# Patient Record
Sex: FEMALE | Race: WHITE | Employment: OTHER | URBAN - METROPOLITAN AREA
[De-identification: names, ages, dates, MRNs, and addresses within clinical notes are randomized per-mention and may not be internally consistent; named-entity substitution may affect disease eponyms.]

---

## 2017-04-27 ENCOUNTER — APPOINTMENT (OUTPATIENT)
Dept: GENERAL RADIOLOGY | Age: 37
DRG: 948 | End: 2017-04-27
Attending: EMERGENCY MEDICINE
Payer: MEDICARE

## 2017-04-27 ENCOUNTER — APPOINTMENT (OUTPATIENT)
Dept: CT IMAGING | Age: 37
DRG: 948 | End: 2017-04-27
Attending: EMERGENCY MEDICINE
Payer: MEDICARE

## 2017-04-27 ENCOUNTER — HOSPITAL ENCOUNTER (INPATIENT)
Age: 37
LOS: 2 days | Discharge: PSYCHIATRIC OR SUBSTANCE USE FACILITY | DRG: 948 | End: 2017-04-29
Attending: EMERGENCY MEDICINE | Admitting: INTERNAL MEDICINE
Payer: MEDICARE

## 2017-04-27 DIAGNOSIS — G25.79 SEROTONIN SYNDROME: Primary | ICD-10-CM

## 2017-04-27 DIAGNOSIS — G93.40 ACUTE ENCEPHALOPATHY: ICD-10-CM

## 2017-04-27 DIAGNOSIS — R74.01 TRANSAMINITIS: ICD-10-CM

## 2017-04-27 PROBLEM — F19.10 POLYSUBSTANCE ABUSE (HCC): Status: ACTIVE | Noted: 2017-04-27

## 2017-04-27 PROBLEM — R41.82 ALTERED MENTAL STATUS: Status: ACTIVE | Noted: 2017-04-27

## 2017-04-27 PROBLEM — R79.89 ELEVATED LFTS: Status: ACTIVE | Noted: 2017-04-27

## 2017-04-27 LAB
ALBUMIN SERPL BCP-MCNC: 4.4 G/DL (ref 3.5–5)
ALBUMIN/GLOB SERPL: 1.3 {RATIO} (ref 1.2–3.5)
ALP SERPL-CCNC: 243 U/L (ref 50–136)
ALT SERPL-CCNC: 334 U/L (ref 12–65)
AMMONIA PLAS-SCNC: 17 UMOL/L (ref 11–32)
AMPHET UR QL SCN: NEGATIVE
ANION GAP BLD CALC-SCNC: 10 MMOL/L (ref 7–16)
APAP SERPL-MCNC: 0 UG/ML (ref 10–30)
AST SERPL W P-5'-P-CCNC: 238 U/L (ref 15–37)
BACTERIA URNS QL MICRO: 0 /HPF
BARBITURATES UR QL SCN: NEGATIVE
BASOPHILS # BLD AUTO: 0 K/UL (ref 0–0.2)
BASOPHILS # BLD: 0 % (ref 0–2)
BENZODIAZ UR QL: NEGATIVE
BILIRUB SERPL-MCNC: 0.8 MG/DL (ref 0.2–1.1)
BUN SERPL-MCNC: 13 MG/DL (ref 6–23)
CALCIUM SERPL-MCNC: 8.7 MG/DL (ref 8.3–10.4)
CANNABINOIDS UR QL SCN: NEGATIVE
CASTS URNS QL MICRO: 0 /LPF
CHLORIDE SERPL-SCNC: 100 MMOL/L (ref 98–107)
CK SERPL-CCNC: 160 U/L (ref 21–215)
CO2 SERPL-SCNC: 27 MMOL/L (ref 21–32)
COCAINE UR QL SCN: NEGATIVE
CREAT SERPL-MCNC: 1.04 MG/DL (ref 0.6–1)
DIFFERENTIAL METHOD BLD: ABNORMAL
EOSINOPHIL # BLD: 0.2 K/UL (ref 0–0.8)
EOSINOPHIL NFR BLD: 2 % (ref 0.5–7.8)
EPI CELLS #/AREA URNS HPF: NORMAL /HPF
ERYTHROCYTE [DISTWIDTH] IN BLOOD BY AUTOMATED COUNT: 12.4 % (ref 11.9–14.6)
ETHANOL SERPL-MCNC: <3 MG/DL
GLOBULIN SER CALC-MCNC: 3.5 G/DL (ref 2.3–3.5)
GLUCOSE SERPL-MCNC: 103 MG/DL (ref 65–100)
HCG UR QL: NEGATIVE
HCT VFR BLD AUTO: 39.9 % (ref 35.8–46.3)
HGB BLD-MCNC: 13.5 G/DL (ref 11.7–15.4)
IMM GRANULOCYTES # BLD: 0 K/UL (ref 0–0.5)
IMM GRANULOCYTES NFR BLD AUTO: 0.2 % (ref 0–5)
LACTATE BLD-SCNC: 0.9 MMOL/L (ref 0.5–1.9)
LYMPHOCYTES # BLD AUTO: 39 % (ref 13–44)
LYMPHOCYTES # BLD: 3.2 K/UL (ref 0.5–4.6)
MAGNESIUM SERPL-MCNC: 1.9 MG/DL (ref 1.8–2.4)
MCH RBC QN AUTO: 29.7 PG (ref 26.1–32.9)
MCHC RBC AUTO-ENTMCNC: 33.8 G/DL (ref 31.4–35)
MCV RBC AUTO: 87.9 FL (ref 79.6–97.8)
METHADONE UR QL: NEGATIVE
MONOCYTES # BLD: 1.3 K/UL (ref 0.1–1.3)
MONOCYTES NFR BLD AUTO: 16 % (ref 4–12)
NEUTS SEG # BLD: 3.7 K/UL (ref 1.7–8.2)
NEUTS SEG NFR BLD AUTO: 43 % (ref 43–78)
OPIATES UR QL: NEGATIVE
PCP UR QL: NEGATIVE
PHOSPHATE SERPL-MCNC: 4.7 MG/DL (ref 2.5–4.5)
PLATELET # BLD AUTO: 232 K/UL (ref 150–450)
PMV BLD AUTO: 10.3 FL (ref 10.8–14.1)
POTASSIUM SERPL-SCNC: 3.9 MMOL/L (ref 3.5–5.1)
PROT SERPL-MCNC: 7.9 G/DL (ref 6.3–8.2)
RBC # BLD AUTO: 4.54 M/UL (ref 4.05–5.25)
RBC #/AREA URNS HPF: 0 /HPF
SALICYLATES SERPL-MCNC: 1.6 MG/DL (ref 10–20)
SODIUM SERPL-SCNC: 137 MMOL/L (ref 136–145)
TSH SERPL DL<=0.005 MIU/L-ACNC: 6.79 UIU/ML (ref 0.36–3.74)
WBC # BLD AUTO: 8.4 K/UL (ref 4.3–11.1)
WBC URNS QL MICRO: NORMAL /HPF

## 2017-04-27 PROCEDURE — 65270000029 HC RM PRIVATE

## 2017-04-27 PROCEDURE — 80307 DRUG TEST PRSMV CHEM ANLYZR: CPT | Performed by: EMERGENCY MEDICINE

## 2017-04-27 PROCEDURE — 70450 CT HEAD/BRAIN W/O DYE: CPT

## 2017-04-27 PROCEDURE — 82550 ASSAY OF CK (CPK): CPT | Performed by: EMERGENCY MEDICINE

## 2017-04-27 PROCEDURE — 74022 RADEX COMPL AQT ABD SERIES: CPT

## 2017-04-27 PROCEDURE — 74011250636 HC RX REV CODE- 250/636: Performed by: EMERGENCY MEDICINE

## 2017-04-27 PROCEDURE — 83605 ASSAY OF LACTIC ACID: CPT

## 2017-04-27 PROCEDURE — 83735 ASSAY OF MAGNESIUM: CPT | Performed by: EMERGENCY MEDICINE

## 2017-04-27 PROCEDURE — 99285 EMERGENCY DEPT VISIT HI MDM: CPT

## 2017-04-27 PROCEDURE — 84439 ASSAY OF FREE THYROXINE: CPT | Performed by: EMERGENCY MEDICINE

## 2017-04-27 PROCEDURE — 82140 ASSAY OF AMMONIA: CPT | Performed by: EMERGENCY MEDICINE

## 2017-04-27 PROCEDURE — 84100 ASSAY OF PHOSPHORUS: CPT | Performed by: EMERGENCY MEDICINE

## 2017-04-27 PROCEDURE — 96374 THER/PROPH/DIAG INJ IV PUSH: CPT

## 2017-04-27 PROCEDURE — 85025 COMPLETE CBC W/AUTO DIFF WBC: CPT | Performed by: EMERGENCY MEDICINE

## 2017-04-27 PROCEDURE — 93005 ELECTROCARDIOGRAM TRACING: CPT | Performed by: EMERGENCY MEDICINE

## 2017-04-27 PROCEDURE — 81025 URINE PREGNANCY TEST: CPT

## 2017-04-27 PROCEDURE — 81015 MICROSCOPIC EXAM OF URINE: CPT | Performed by: EMERGENCY MEDICINE

## 2017-04-27 PROCEDURE — 84443 ASSAY THYROID STIM HORMONE: CPT | Performed by: EMERGENCY MEDICINE

## 2017-04-27 PROCEDURE — 81003 URINALYSIS AUTO W/O SCOPE: CPT

## 2017-04-27 PROCEDURE — 96375 TX/PRO/DX INJ NEW DRUG ADDON: CPT

## 2017-04-27 PROCEDURE — 96376 TX/PRO/DX INJ SAME DRUG ADON: CPT

## 2017-04-27 PROCEDURE — 80053 COMPREHEN METABOLIC PANEL: CPT | Performed by: EMERGENCY MEDICINE

## 2017-04-27 PROCEDURE — 74011250636 HC RX REV CODE- 250/636: Performed by: INTERNAL MEDICINE

## 2017-04-27 RX ORDER — LEVETIRACETAM 500 MG/1
500 TABLET ORAL 2 TIMES DAILY
Status: DISCONTINUED | OUTPATIENT
Start: 2017-04-27 | End: 2017-04-29 | Stop reason: HOSPADM

## 2017-04-27 RX ORDER — MIRTAZAPINE 15 MG/1
15 TABLET, FILM COATED ORAL
COMMUNITY

## 2017-04-27 RX ORDER — SODIUM CHLORIDE 9 MG/ML
125 INJECTION, SOLUTION INTRAVENOUS CONTINUOUS
Status: DISCONTINUED | OUTPATIENT
Start: 2017-04-27 | End: 2017-04-29 | Stop reason: HOSPADM

## 2017-04-27 RX ORDER — SODIUM CHLORIDE 0.9 % (FLUSH) 0.9 %
5-10 SYRINGE (ML) INJECTION AS NEEDED
Status: DISCONTINUED | OUTPATIENT
Start: 2017-04-27 | End: 2017-04-29 | Stop reason: HOSPADM

## 2017-04-27 RX ORDER — DIAZEPAM 10 MG/2ML
5 INJECTION INTRAMUSCULAR
Status: COMPLETED | OUTPATIENT
Start: 2017-04-27 | End: 2017-04-27

## 2017-04-27 RX ORDER — SODIUM CHLORIDE 0.9 % (FLUSH) 0.9 %
5-10 SYRINGE (ML) INJECTION EVERY 8 HOURS
Status: DISCONTINUED | OUTPATIENT
Start: 2017-04-27 | End: 2017-04-28 | Stop reason: SDUPTHER

## 2017-04-27 RX ORDER — LORAZEPAM 2 MG/ML
2 INJECTION INTRAMUSCULAR
Status: COMPLETED | OUTPATIENT
Start: 2017-04-27 | End: 2017-04-27

## 2017-04-27 RX ORDER — SODIUM CHLORIDE 0.9 % (FLUSH) 0.9 %
5-10 SYRINGE (ML) INJECTION AS NEEDED
Status: DISCONTINUED | OUTPATIENT
Start: 2017-04-27 | End: 2017-04-28 | Stop reason: SDUPTHER

## 2017-04-27 RX ORDER — LEVETIRACETAM 500 MG/1
500 TABLET ORAL 2 TIMES DAILY
COMMUNITY

## 2017-04-27 RX ORDER — AMOXICILLIN 250 MG
2 CAPSULE ORAL DAILY
Status: DISCONTINUED | OUTPATIENT
Start: 2017-04-28 | End: 2017-04-29 | Stop reason: HOSPADM

## 2017-04-27 RX ORDER — DIAZEPAM 10 MG/2ML
5 INJECTION INTRAMUSCULAR
Status: DISCONTINUED | OUTPATIENT
Start: 2017-04-27 | End: 2017-04-28

## 2017-04-27 RX ORDER — ACETAMINOPHEN 325 MG/1
650 TABLET ORAL
Status: DISCONTINUED | OUTPATIENT
Start: 2017-04-27 | End: 2017-04-27

## 2017-04-27 RX ORDER — LORAZEPAM 2 MG/ML
INJECTION INTRAMUSCULAR
Status: ACTIVE
Start: 2017-04-27 | End: 2017-04-28

## 2017-04-27 RX ORDER — LORAZEPAM 2 MG/ML
1 INJECTION INTRAMUSCULAR
Status: COMPLETED | OUTPATIENT
Start: 2017-04-27 | End: 2017-04-27

## 2017-04-27 RX ORDER — TRAZODONE HYDROCHLORIDE 50 MG/1
50 TABLET ORAL
COMMUNITY

## 2017-04-27 RX ORDER — SENNOSIDES 8.6 MG/1
1 TABLET ORAL 2 TIMES DAILY
COMMUNITY

## 2017-04-27 RX ORDER — LORAZEPAM 2 MG/ML
2 INJECTION INTRAMUSCULAR
Status: DISCONTINUED | OUTPATIENT
Start: 2017-04-27 | End: 2017-04-29 | Stop reason: HOSPADM

## 2017-04-27 RX ORDER — MAGNESIUM CITRATE
296 SOLUTION, ORAL ORAL
COMMUNITY

## 2017-04-27 RX ORDER — ONDANSETRON HYDROCHLORIDE 8 MG/1
8 TABLET, FILM COATED ORAL
COMMUNITY
End: 2022-03-15 | Stop reason: ALTCHOICE

## 2017-04-27 RX ORDER — LORAZEPAM 1 MG/1
1 TABLET ORAL 3 TIMES DAILY
COMMUNITY
End: 2017-04-29

## 2017-04-27 RX ORDER — OLANZAPINE 10 MG/1
10 TABLET ORAL
COMMUNITY
End: 2017-04-29

## 2017-04-27 RX ORDER — SODIUM CHLORIDE 0.9 % (FLUSH) 0.9 %
5-10 SYRINGE (ML) INJECTION EVERY 8 HOURS
Status: DISCONTINUED | OUTPATIENT
Start: 2017-04-27 | End: 2017-04-29 | Stop reason: HOSPADM

## 2017-04-27 RX ORDER — ENOXAPARIN SODIUM 100 MG/ML
40 INJECTION SUBCUTANEOUS EVERY 24 HOURS
Status: DISCONTINUED | OUTPATIENT
Start: 2017-04-27 | End: 2017-04-29 | Stop reason: HOSPADM

## 2017-04-27 RX ORDER — PROMETHAZINE HYDROCHLORIDE 25 MG/1
25 TABLET ORAL
Status: DISCONTINUED | OUTPATIENT
Start: 2017-04-27 | End: 2017-04-29 | Stop reason: HOSPADM

## 2017-04-27 RX ORDER — LACTULOSE 10 G/15ML
10 SOLUTION ORAL; RECTAL 3 TIMES DAILY
COMMUNITY
End: 2017-04-29

## 2017-04-27 RX ORDER — HALOPERIDOL 10 MG/1
5 TABLET ORAL 3 TIMES DAILY
COMMUNITY
End: 2017-04-29

## 2017-04-27 RX ORDER — LORAZEPAM 2 MG/ML
2 INJECTION INTRAMUSCULAR
Status: DISCONTINUED | OUTPATIENT
Start: 2017-04-27 | End: 2017-04-27

## 2017-04-27 RX ADMIN — SODIUM CHLORIDE 125 ML/HR: 900 INJECTION, SOLUTION INTRAVENOUS at 23:07

## 2017-04-27 RX ADMIN — LORAZEPAM 1 MG: 2 INJECTION INTRAMUSCULAR; INTRAVENOUS at 18:40

## 2017-04-27 RX ADMIN — DIAZEPAM 5 MG: 5 INJECTION, SOLUTION INTRAMUSCULAR; INTRAVENOUS at 23:05

## 2017-04-27 RX ADMIN — LORAZEPAM 1 MG: 2 INJECTION, SOLUTION INTRAMUSCULAR; INTRAVENOUS at 17:45

## 2017-04-27 RX ADMIN — LORAZEPAM 2 MG: 2 INJECTION INTRAMUSCULAR; INTRAVENOUS at 23:34

## 2017-04-27 RX ADMIN — ENOXAPARIN SODIUM 40 MG: 40 INJECTION SUBCUTANEOUS at 23:06

## 2017-04-27 RX ADMIN — DIAZEPAM 5 MG: 5 INJECTION, SOLUTION INTRAMUSCULAR; INTRAVENOUS at 18:56

## 2017-04-27 RX ADMIN — LORAZEPAM 2 MG: 2 INJECTION INTRAMUSCULAR; INTRAVENOUS at 18:46

## 2017-04-27 RX ADMIN — Medication 10 ML: at 23:05

## 2017-04-27 NOTE — ED PROVIDER NOTES
HPI Comments: 65-year-old female presents with caregiver from [facility] for altered mental status. She has a history of alcohol, heroin, and methamphetamine abuse. She has been at [facility] since April 3. She has been doing well on detoxification program until 3 days ago. She has had increased agitation, uncontrollable shaking, and confusion. Unable to get any further history from the patient. Caregiver states she has not been eating or drinking well. According to the records, she was treated with ciprofloxacin April 14 through the 17th for a urinary tract infection. She is on buprenorphine, Haldol, Keppra, Ativan, Zyprexa, venlafaxine amongst multiple other medications. She was also treated with Bactrim and Flagyl. Caregiver states she has not slept for 2 days. Patient is a 30 y.o. female presenting with altered mental status. The history is provided by the EMS personnel and a caregiver. The history is limited by the condition of the patient. Altered mental status           Past Medical History:   Diagnosis Date    Anxiety and depression     anxiety, depression    Back pain     problems with L4-5 vertebra    Common bile duct dilatation 6/12/2013    Depression     Gall bladder disease 6/12/2013    Psychiatric disorder        Past Surgical History:   Procedure Laterality Date    HX CHOLECYSTECTOMY      HX ORTHOPAEDIC  2007    left foot, tonail excision         Family History:   Problem Relation Age of Onset    Cancer Paternal Grandmother      skin cancer       Social History     Social History    Marital status:      Spouse name: N/A    Number of children: N/A    Years of education: N/A     Occupational History    Not on file.      Social History Main Topics    Smoking status: Never Smoker    Smokeless tobacco: Not on file    Alcohol use No    Drug use: No    Sexual activity: No     Other Topics Concern    Not on file     Social History Narrative         ALLERGIES: Review of patient's allergies indicates no known allergies. Review of Systems   Unable to perform ROS: Mental status change       Vitals:    04/27/17 1706   BP: 98/68   Pulse: 72   Resp: 16   Temp: 98.6 °F (37 °C)   SpO2: 99%   Weight: 59 kg (130 lb)   Height: 5' 7\" (1.702 m)            Physical Exam   Constitutional: She appears well-developed. HENT:   Head: Normocephalic and atraumatic. Right Ear: External ear normal.   Left Ear: External ear normal.   Nose: Nose normal.   Mouth/Throat: Mucous membranes are dry. Eyes: Conjunctivae and EOM are normal. Pupils are equal, round, and reactive to light. Neck: Neck supple. Cardiovascular: Regular rhythm and normal heart sounds. No murmur heard. Pulmonary/Chest: Effort normal and breath sounds normal. No respiratory distress. Abdominal: Soft. There is no tenderness. Musculoskeletal: Normal range of motion. She exhibits no deformity. Neurological: She is alert. She has normal strength. She is disoriented. She displays tremor. No sensory deficit. Skin: Skin is dry. She is not diaphoretic. No erythema. Psychiatric: Her mood appears anxious. Her affect is labile and inappropriate. Her speech is slurred. She is agitated and hyperactive. Cognition and memory are impaired. She expresses inappropriate judgment. She is noncommunicative. She is inattentive. Nursing note and vitals reviewed. MDM  Number of Diagnoses or Management Options  Diagnosis management comments: Parts of this document were created using dragon voice recognition software. The chart has been reviewed but errors may still be present. 8:04 PM  Patient became extremely agitated. She required 4 mg of Ativan and 5 mg of Valium in order to obtain labs and imaging. Suspect serotonin syndrome. LFTs have been gradually increasing with normal ammonia level. Normal lactic acid and CK. No signs of neuroleptic malignant syndrome. Discussed with hospitalist for admission. Amount and/or Complexity of Data Reviewed  Clinical lab tests: ordered and reviewed (Results for orders placed or performed during the hospital encounter of 04/27/17  -AMMONIA       Result                                            Value                         Ref Range                       Ammonia                                           17                            11 - 32 UMOL/L             -CBC WITH AUTOMATED DIFF       Result                                            Value                         Ref Range                       WBC                                               8.4                           4.3 - 11.1 K/uL                 RBC                                               4.54                          4.05 - 5.25 M/uL                HGB                                               13.5                          11.7 - 15.4 g/dL                HCT                                               39.9                          35.8 - 46.3 %                   MCV                                               87.9                          79.6 - 97.8 FL                  MCH                                               29.7                          26.1 - 32.9 PG                  MCHC                                              33.8                          31.4 - 35.0 g/dL                RDW                                               12.4                          11.9 - 14.6 %                   PLATELET                                          232                           150 - 450 K/uL                  MPV                                               10.3 (L)                      10.8 - 14.1 FL                  DF                                                AUTOMATED                                                     NEUTROPHILS                                       43                            43 - 78 %                       LYMPHOCYTES                                       39 13 - 44 %                       MONOCYTES                                         16 (H)                        4.0 - 12.0 %                    EOSINOPHILS                                       2                             0.5 - 7.8 %                     BASOPHILS                                         0                             0.0 - 2.0 %                     IMMATURE GRANULOCYTES                             0.2                           0.0 - 5.0 %                     ABS. NEUTROPHILS                                  3.7                           1.7 - 8.2 K/UL                  ABS. LYMPHOCYTES                                  3.2                           0.5 - 4.6 K/UL                  ABS. MONOCYTES                                    1.3                           0.1 - 1.3 K/UL                  ABS. EOSINOPHILS                                  0.2                           0.0 - 0.8 K/UL                  ABS. BASOPHILS                                    0.0                           0.0 - 0.2 K/UL                  ABS. IMM.  GRANS.                                  0.0                           0.0 - 0.5 K/UL             -METABOLIC PANEL, COMPREHENSIVE       Result                                            Value                         Ref Range                       Sodium                                            137                           136 - 145 mmol/L                Potassium                                         3.9                           3.5 - 5.1 mmol/L                Chloride                                          100                           98 - 107 mmol/L                 CO2                                               27                            21 - 32 mmol/L                  Anion gap                                         10                            7 - 16 mmol/L                   Glucose                                           103 (H) 65 - 100 mg/dL                  BUN                                               13                            6 - 23 MG/DL                    Creatinine                                        1. 04 (H)                      0.6 - 1.0 MG/DL                 GFR est AA                                        >60                           >60 ml/min/1.73m2               GFR est non-AA                                    >60                           >60 ml/min/1.73m2               Calcium                                           8.7                           8.3 - 10.4 MG/DL                Bilirubin, total                                  0.8                           0.2 - 1.1 MG/DL                 ALT (SGPT)                                        334 (H)                       12 - 65 U/L                     AST (SGOT)                                        238 (H)                       15 - 37 U/L                     Alk. phosphatase                                  243 (H)                       50 - 136 U/L                    Protein, total                                    7.9                           6.3 - 8.2 g/dL                  Albumin                                           4.4                           3.5 - 5.0 g/dL                  Globulin                                          3.5                           2.3 - 3.5 g/dL                  A-G Ratio                                         1.3                           1.2 - 3.5                  -CK       Result                                            Value                         Ref Range                       CK                                                160                           21 - 215 U/L               -ETHYL ALCOHOL       Result                                            Value                         Ref Range                       ALCOHOL(ETHYL),SERUM                              <3                            MG/DL -SALICYLATE       Result                                            Value                         Ref Range                       SALICYLATE                                        1.6 (L)                       10 - 20 MG/DL              -MAGNESIUM       Result                                            Value                         Ref Range                       Magnesium                                         1.9                           1.8 - 2.4 mg/dL            -PHOSPHORUS       Result                                            Value                         Ref Range                       Phosphorus                                        4.7 (H)                       2.5 - 4.5 MG/DL            -TSH 3RD GENERATION       Result                                            Value                         Ref Range                       TSH                                               6.786 (H)                     0.358 - 3.740 uIU/mL       -ACETAMINOPHEN       Result                                            Value                         Ref Range                       ACETAMINOPHEN                                     0 (L)                         10.0 - 30.0 ug/mL          -POC LACTIC ACID       Result                                            Value                         Ref Range                       Lactic Acid (POC)                                 0.9                           0.5 - 1.9 mmol/L             )  Tests in the radiology section of CPT®: ordered and reviewed (Xr Abd Acute W 1 V Chest    Result Date: 4/27/2017  Acute abdominal series: 04/27/2017 HISTORY: Mild abdominal pain An acute abdominal series was performed. COMPARISON: None FINDINGS: There is mild distention involving portions of the colon with moderate amount of fecal material present. There are no dilated small bowel loops. There is no free intraperitoneal air. No radiopaque densities resembling calculi are identified.  The patient is status post cholecystectomy. A single view the chest was obtained. No prior studies are available for comparison. The cardiac and mediastinal silhouettes are normal in size and configuration. The lungs and pleural spaces are clear. The pulmonary vasculature is within normal limits. IMPRESSION: Mildly distended colon with moderate amount of fecal material.      Ct Head Wo Cont    Result Date: 4/27/2017  CT head without contrast: 04/27/2017 History: Increased agitation x1 day, history of drug abuse. Technique: 5mm axial images were obtained from the skull base to the vertex without intravenous contrast.  Radiation dose reduction techniques were used for this study:  Our CT scanners use one or all of the following: Automated exposure control, adjustment of the mA and/or kVp according to patient's size, iterative reconstruction. Comparison: None Findings: The ventricles and sulci are normal in size and configuration. There are no extra-axial fluid collections. There is no evidence to suggest an acute major territorial infarct. There is no evidence of acute intracranial hemorrhage or mass effect. The bony calvarium is intact. The visualized mastoid air cells and paranasal sinuses are well pneumatized and aerated.      Impression: Unremarkable unenhanced CT scan of the brain.     )  Tests in the medicine section of CPT®: ordered and reviewed      ED Course       Procedures

## 2017-04-27 NOTE — ED TRIAGE NOTES
From [address] [place] [address], pt has new onset with AMS starting last night with unintelligible speech with increased irritation. Pt is there for polysubstance abuse. /58, HR 80, RR 20, . Pt has intermittent moments of irritation and wanting to get up and yell profanities. At [address] [place] [address] since 4/3 .  Hx of psychosis, suicidal, drug dependency/abuse

## 2017-04-27 NOTE — ED NOTES
Took report from Serena Degroot RN at 0602. Pt in agitated, confrontational state. Attempted to attach IVF with no success. Gave 1 mg of ativan. Pt more agitated. Notified md.  Per order gave 2 mg of ativan. Pt in bed but throwing arms and legs about. Notified md. Gave valium as ordered.   Anthony Espinal from [facility] with pt and encouraged to call me with any needs

## 2017-04-28 ENCOUNTER — APPOINTMENT (OUTPATIENT)
Dept: ULTRASOUND IMAGING | Age: 37
DRG: 948 | End: 2017-04-28
Attending: INTERNAL MEDICINE
Payer: MEDICARE

## 2017-04-28 LAB
ALBUMIN SERPL BCP-MCNC: 3.6 G/DL (ref 3.5–5)
ALBUMIN/GLOB SERPL: 1.3 {RATIO} (ref 1.2–3.5)
ALP SERPL-CCNC: 208 U/L (ref 50–136)
ALT SERPL-CCNC: 260 U/L (ref 12–65)
ANION GAP BLD CALC-SCNC: 9 MMOL/L (ref 7–16)
AST SERPL W P-5'-P-CCNC: 178 U/L (ref 15–37)
ATRIAL RATE: 89 BPM
BASOPHILS # BLD AUTO: 0 K/UL (ref 0–0.2)
BASOPHILS # BLD: 0 % (ref 0–2)
BILIRUB SERPL-MCNC: 0.7 MG/DL (ref 0.2–1.1)
BUN SERPL-MCNC: 10 MG/DL (ref 6–23)
CALCIUM SERPL-MCNC: 8.4 MG/DL (ref 8.3–10.4)
CALCULATED P AXIS, ECG09: 62 DEGREES
CALCULATED R AXIS, ECG10: 52 DEGREES
CALCULATED T AXIS, ECG11: 47 DEGREES
CHLORIDE SERPL-SCNC: 107 MMOL/L (ref 98–107)
CO2 SERPL-SCNC: 27 MMOL/L (ref 21–32)
CREAT SERPL-MCNC: 0.85 MG/DL (ref 0.6–1)
DIAGNOSIS, 93000: NORMAL
DIFFERENTIAL METHOD BLD: ABNORMAL
EOSINOPHIL # BLD: 0.1 K/UL (ref 0–0.8)
EOSINOPHIL NFR BLD: 2 % (ref 0.5–7.8)
ERYTHROCYTE [DISTWIDTH] IN BLOOD BY AUTOMATED COUNT: 12.4 % (ref 11.9–14.6)
GLOBULIN SER CALC-MCNC: 2.8 G/DL (ref 2.3–3.5)
GLUCOSE SERPL-MCNC: 83 MG/DL (ref 65–100)
HCT VFR BLD AUTO: 35.4 % (ref 35.8–46.3)
HGB BLD-MCNC: 11.7 G/DL (ref 11.7–15.4)
IMM GRANULOCYTES # BLD: 0 K/UL (ref 0–0.5)
IMM GRANULOCYTES NFR BLD AUTO: 0 % (ref 0–5)
INR PPP: 1 (ref 0.9–1.2)
LYMPHOCYTES # BLD AUTO: 34 % (ref 13–44)
LYMPHOCYTES # BLD: 1.9 K/UL (ref 0.5–4.6)
MCH RBC QN AUTO: 29.3 PG (ref 26.1–32.9)
MCHC RBC AUTO-ENTMCNC: 33.1 G/DL (ref 31.4–35)
MCV RBC AUTO: 88.7 FL (ref 79.6–97.8)
MONOCYTES # BLD: 0.7 K/UL (ref 0.1–1.3)
MONOCYTES NFR BLD AUTO: 13 % (ref 4–12)
NEUTS SEG # BLD: 2.7 K/UL (ref 1.7–8.2)
NEUTS SEG NFR BLD AUTO: 51 % (ref 43–78)
P-R INTERVAL, ECG05: 170 MS
PLATELET # BLD AUTO: 211 K/UL (ref 150–450)
PMV BLD AUTO: 10.6 FL (ref 10.8–14.1)
POTASSIUM SERPL-SCNC: 3.6 MMOL/L (ref 3.5–5.1)
PROT SERPL-MCNC: 6.4 G/DL (ref 6.3–8.2)
PROTHROMBIN TIME: 10.7 SEC (ref 9.6–12)
Q-T INTERVAL, ECG07: 368 MS
QRS DURATION, ECG06: 90 MS
QTC CALCULATION (BEZET), ECG08: 447 MS
RBC # BLD AUTO: 3.99 M/UL (ref 4.05–5.25)
SODIUM SERPL-SCNC: 143 MMOL/L (ref 136–145)
T4 FREE SERPL-MCNC: 0.9 NG/DL (ref 0.78–1.46)
T4 FREE SERPL-MCNC: 1 NG/DL (ref 0.78–1.46)
VENTRICULAR RATE, ECG03: 89 BPM
WBC # BLD AUTO: 5.4 K/UL (ref 4.3–11.1)

## 2017-04-28 PROCEDURE — 85610 PROTHROMBIN TIME: CPT | Performed by: INTERNAL MEDICINE

## 2017-04-28 PROCEDURE — 65270000029 HC RM PRIVATE

## 2017-04-28 PROCEDURE — 76705 ECHO EXAM OF ABDOMEN: CPT

## 2017-04-28 PROCEDURE — 84439 ASSAY OF FREE THYROXINE: CPT | Performed by: PHYSICIAN ASSISTANT

## 2017-04-28 PROCEDURE — 36415 COLL VENOUS BLD VENIPUNCTURE: CPT | Performed by: INTERNAL MEDICINE

## 2017-04-28 PROCEDURE — 85025 COMPLETE CBC W/AUTO DIFF WBC: CPT | Performed by: INTERNAL MEDICINE

## 2017-04-28 PROCEDURE — 80053 COMPREHEN METABOLIC PANEL: CPT | Performed by: INTERNAL MEDICINE

## 2017-04-28 PROCEDURE — 74011250637 HC RX REV CODE- 250/637: Performed by: INTERNAL MEDICINE

## 2017-04-28 PROCEDURE — 74011250636 HC RX REV CODE- 250/636: Performed by: INTERNAL MEDICINE

## 2017-04-28 RX ORDER — LORAZEPAM 2 MG/ML
2 INJECTION INTRAMUSCULAR ONCE
Status: COMPLETED | OUTPATIENT
Start: 2017-04-28 | End: 2017-04-28

## 2017-04-28 RX ORDER — DIAZEPAM 10 MG/2ML
10 INJECTION INTRAMUSCULAR
Status: DISCONTINUED | OUTPATIENT
Start: 2017-04-28 | End: 2017-04-29 | Stop reason: HOSPADM

## 2017-04-28 RX ADMIN — DIAZEPAM 10 MG: 5 INJECTION, SOLUTION INTRAMUSCULAR; INTRAVENOUS at 19:26

## 2017-04-28 RX ADMIN — Medication 10 ML: at 16:17

## 2017-04-28 RX ADMIN — DIAZEPAM 10 MG: 5 INJECTION, SOLUTION INTRAMUSCULAR; INTRAVENOUS at 12:42

## 2017-04-28 RX ADMIN — SODIUM CHLORIDE 125 ML/HR: 900 INJECTION, SOLUTION INTRAVENOUS at 16:22

## 2017-04-28 RX ADMIN — LEVETIRACETAM 500 MG: 500 TABLET, FILM COATED ORAL at 09:14

## 2017-04-28 RX ADMIN — SENNOSIDES AND DOCUSATE SODIUM 2 TABLET: 8.6; 5 TABLET ORAL at 09:13

## 2017-04-28 RX ADMIN — LORAZEPAM 2 MG: 2 INJECTION INTRAMUSCULAR; INTRAVENOUS at 01:03

## 2017-04-28 RX ADMIN — LORAZEPAM 2 MG: 2 INJECTION INTRAMUSCULAR; INTRAVENOUS at 13:19

## 2017-04-28 RX ADMIN — DIAZEPAM 10 MG: 5 INJECTION, SOLUTION INTRAMUSCULAR; INTRAVENOUS at 04:43

## 2017-04-28 RX ADMIN — Medication 10 ML: at 22:14

## 2017-04-28 RX ADMIN — Medication 10 ML: at 05:31

## 2017-04-28 RX ADMIN — LEVETIRACETAM 500 MG: 500 TABLET, FILM COATED ORAL at 17:26

## 2017-04-28 RX ADMIN — SODIUM CHLORIDE 125 ML/HR: 900 INJECTION, SOLUTION INTRAVENOUS at 05:57

## 2017-04-28 RX ADMIN — ENOXAPARIN SODIUM 40 MG: 40 INJECTION SUBCUTANEOUS at 22:14

## 2017-04-28 RX ADMIN — LORAZEPAM 2 MG: 2 INJECTION INTRAMUSCULAR; INTRAVENOUS at 15:52

## 2017-04-28 NOTE — PROGRESS NOTES
Spoke with Dr. Kaylen Naranjo regarding patients confusion and attempts to get out of bed. Orders for sitter received. Per Dr. Kaylen Naranjo, use sitters as needed for patient safety. Per Dr. Kaylen Naranjo, pt. Does Not legally require a sitter because she is voluntary seeking treatment at [facility], but use sitter as needed to keep patient safe.

## 2017-04-28 NOTE — PROGRESS NOTES
Hospitalist Progress Note    2017  Admit Date: 2017  5:04 PM   NAME: Raegan Harris   :  1980   MRN:  [id number]   Attending: Nicholaus Paget, MD  PCP:  Ina Bolaños MD    SUBJECTIVE:   Patient is a 29 yo CF with a history of polysubstance abuse (meth, heroin, marijuana), bipolar 2, who presented to the [facility] ED from [facility] with altered mental status, confusion and agitation x 2 days. She has been voluntarily admitted to [facility] since 4/3 for detox. She was started on multiple medications at [facility] including suboxone, haldol, keppra, ativan, effexor, remeron, zyprexa and zofran. Upon presentation to the ED, she was agitated and intermittently having myoclonic jerking of her upper extremities. She was also noted to have significant increase in her LFTs since admission to [facility].     2017: Sedated and in soft restraints. Unable to obtain Review of Systems due to altered mental status. PHYSICAL EXAM     Visit Vitals    BP (!) 87/60 (BP 1 Location: Right arm, BP Patient Position: At rest)  Comment: RN notified    Pulse 86    Temp 97.6 °F (36.4 °C)    Resp 18    Ht 5' 7\" (1.702 m)    Wt 59 kg (130 lb)    SpO2 97%    BMI 20.36 kg/m2      Temp (24hrs), Av °F (36.7 °C), Min:97.6 °F (36.4 °C), Max:98.6 °F (37 °C)    Oxygen Therapy  O2 Sat (%): 97 % (17 0825)  Pulse via Oximetry: 86 beats per minute (17 2105)  O2 Device: Room air (17 1706)    Intake/Output Summary (Last 24 hours) at 17 1035  Last data filed at 17 0557   Gross per 24 hour   Intake              817 ml   Output                0 ml   Net              817 ml      General: Sedated, not alert. NAD. HENT: Normocephalic, atraumatic. Neck: Supple, Non-tender. Respiratory: coarse b/l breath sounds. Respirations are non-labored. Cardiovascular: Normal rate, Regular rhythm, No murmur.    Gastrointestinal: Soft, Non-tender, nondistended, positive bowel sounds   Musculoskeletal: No cyanosis, clubbing or edema.   Integumentary: Warm, Dry, Pink, no rash. Neurologic: limited. IMAGING   CT head: NAD    XR ABD Acute: Mildly distended colon with moderate amount of fecal material.    ASSESSMENT      AMS  - concern for serotonin syndrome.   - Held effexor, zofran, remeron which can contribute to serotonin syndrome.   - Hold haldol and zyprexa   - When mental status improves, will need psych to re-assess about restarting these medications. - Valium and Ativan IV prn  - tsh elevated. Obtain free t4.   - Unclear if she has a seizure d/o or if keppra added for concern of withdrawal seizure. Will continue for now. Convert to IV if unable to take po. -     Elevated LFTs  - improving  - normal ammonia level.   - RUQ us pending.   - INR wnl    Hypotension  - continue ivfs    Constipation.   - Continue bowel regimen; stool softeners and prn laxatives. DVT prophylaxis: lovenox  Full Code  Discussed with Dr. Gwen Hayden: to [facility] when stable. Total Time spent: 35 minutes. Counseling & coordinating care dominated the encounter >55%. Reviewed prior records, labs, vitals, diagnostic tests, flow sheet, home medications, inpatient medications, nursing and provider notes.     Josef Campa PA-C, MPAS

## 2017-04-28 NOTE — PROGRESS NOTES
TRANSFER - IN REPORT:    Verbal report received from Sb Herron RN (name) on Radha Rolling  being received from ER (unit) for routine progression of care      Report consisted of patients Situation, Background, Assessment and   Recommendations(SBAR). Information from the following report(s) SBAR, Kardex and ED Summary was reviewed with the receiving nurse. Opportunity for questions and clarification was provided. Assessment completed upon patients arrival to unit and care assumed.

## 2017-04-28 NOTE — PROGRESS NOTES
Unable to do database or skin assessment at this time due to pt being combative, confused and non-coorperative.

## 2017-04-28 NOTE — PROGRESS NOTES
Second attempt to get pt down to ultrasound, patient still not sedated, restless in bed, mumbling to self constantly. Ultrasound tech plans to send for her again at 1630.

## 2017-04-28 NOTE — PROGRESS NOTES
Primary RN gave Valium 10 mg IVP at 0443. Patient resting comfortably in bed with eyes closed. HOB elevated. Bilateral wrist restraints in place.

## 2017-04-28 NOTE — PROGRESS NOTES
Pt was able to remove wrist restraint, agitated, climbed out of bed with no clothes on, crying, very confused, talking to an imaginary person. Pt placed back on bed and soft restraints on both wrists re-applied. Ativan 2 mg IV given at this time for agitation. Bed low & locked, side rails x4 up with sitter at bedside. Door open for observation.

## 2017-04-28 NOTE — PROGRESS NOTES
Assessment done via doc flow sheet. Pt resting quietly in bed, resp easy & regular, sat 98% on room air. Pt drowsy, unable to assess orientation. Bilateral wrist restraints in place, circulation good, no trauma or injury noted. Sitter at bedside. Bed low & locked, side rails x3 up with call light within reach, instructed to call for assistance as needed. Door open for observation.

## 2017-04-28 NOTE — PROGRESS NOTES
Dr Miranda Letters notified of pt being  restless, combative and trying to get out of bed, Dr Miranda Letters put in new orders of restraint, one time dose of Ativan 2mg and valium increased to 10mg.

## 2017-04-28 NOTE — PROGRESS NOTES
Reviewed chart and call made to [facility]. Karley spoke with Jaime Carlson who reports pt was voluntarily committed to [facility]. Jaime Carlson states pt \"can come back if she wants to\". Jaime Carlson did make the statement pt could return if she was able and SW tried to get from her what that meant. SW did explain how pt is completely out of her mind. Jaime Carlson described how pt was when they sent her over yesterday which sounds like pt's current state. Jaime Carlson says pt is a frequent flyer at [facility], she relapses and then comes back in voluntarily but that pt has never been like this or violent any other admission  added pt uses many drugs and she wondered if she got a \"bad batch\" of something. Jaime Carlson asked if pt had slept because she had not slept for several days before hospital admission. Jaime Carlson also reports that pt \"jumps\" from drug house to drug house and that she doesn't have a stable home. SW asked that when she discharges on her multiple admissions there they just let her go to where ever she says she has a place to stay. Jaime Carlson replied that pt usually \"finds a friend\" and at [facility] they go live together for a while until things don't work out. Jaime Carlson states this is common behavior for patients at [facility]. SW to follow for pt's needs at [facility].   Jacoby Gage

## 2017-04-28 NOTE — PROGRESS NOTES
Patient agitated, confused and has attempted to get out of bed multiple times. Valium 5 mg given slow IVP. Sitter at bedside.

## 2017-04-28 NOTE — PROGRESS NOTES
Ultrasound of abdomen was not able to be done today due to patient's inability to follow commands.   Radiology will send for her again @ 1400 hrs, and will call for nurse for Ativan to be given prior to test.

## 2017-04-28 NOTE — PROGRESS NOTES
Attempted to notify patients mother regarding MD order for bilateral soft wrist restraints. No answer at this time.

## 2017-04-28 NOTE — H&P
HOSPITALIST H&P      NAME:  Phyllis Hernandez   Age:  30 y.o.  :   1980   MRN:   [id number]    PCP: Yesica Celaya MD    Attending MD: Caridad Vasquez MD    Treatment Team: Attending Provider: Vaughn Augustine MD; Primary Nurse: Celio Hernandez RN    HPI:     Phyllis Hernandez is a 35yoF with polysubstance abuse (meth, heroin, marijuana), bipolar 2, who presented to the [facility] ED from [facility] with altered mental status x 2 days. She has been voluntarily admitted to [facility] since 4/3 for detox. Per her caretaker at bedside, she has had worsening mental status, confusion and agitation for last 2 days. She was started on multiple medications at [facility] including suboxone, haldol, keppra, ativan, effexor, remeron, zyprexa and zofran. The medication list provided by [facility] is incomplete and does not note how often she was receiving prn medications. Per records, upon presentation to the ED, she was agitated and intermittently having myoclonic jerking of her upper extremities. She was afebrile and did not appear diaphoretic. She was also noted to have significant increase in her LFTs since admission to [facility]. She received 4mg of Ativan and 5mg of Valium in the ED prior to admission, so history, physical exam and ROS are severely limited. Complete ROS unable to be completed    Past Medical History:   Diagnosis Date    Anxiety and depression     anxiety, depression    Back pain     problems with L4-5 vertebra    Common bile duct dilatation 2013    Depression     Gall bladder disease 2013    Psychiatric disorder         Past Surgical History:   Procedure Laterality Date    HX CHOLECYSTECTOMY      HX ORTHOPAEDIC      left foot, tonail excision        Prior to Admission Medications   Prescriptions Last Dose Informant Patient Reported? Taking? DM/PHENYLEPH/PYRIL/DEXCHLROPHE (RESPERAL PO)   Yes No   Sig: Take 2 mg by mouth nightly.    clonazePAM (KLONOPIN) 1 mg tablet   Yes No   Sig: Take  by mouth two (2) times a day. methadone (METHADOSE) 40 mg soluble tablet   Yes No   Sig: Take 35 mg by mouth daily. temazepam (RESTORIL) 30 mg capsule   Yes No   Sig: Take 30 mg by mouth nightly. Facility-Administered Medications: None       No Known Allergies     Social History   Substance Use Topics    Smoking status: Never Smoker    Smokeless tobacco: Not on file    Alcohol use No        Family History   Problem Relation Age of Onset    Cancer Paternal Grandmother      skin cancer        Objective:       Visit Vitals    BP 95/55    Pulse 86    Temp 98.5 °F (36.9 °C)    Resp 16    Ht 5' 7\" (1.702 m)    Wt 59 kg (130 lb)    SpO2 96%    BMI 20.36 kg/m2        Temp (24hrs), Av.6 °F (37 °C), Min:98.5 °F (36.9 °C), Max:98.6 °F (37 °C)      Oxygen Therapy  O2 Sat (%): 96 % (17)  Pulse via Oximetry: 86 beats per minute (17 2105)  O2 Device: Room air (17 1706)      Physical Exam:  General:    Sedated, NAD    Eyes:   Anicteric  Head:   Normocephalic, without obvious abnormality, atraumatic. ENT:  Neck supple, dry MM  Lungs:   Clear to auscultation bilaterally. Normal resp effort  Heart:   RRR, no BLE edema  Abdomen:   Soft, NTTP, +NABS  MSK:  Spontaneously moves extremities. No deformities/lesions. Skin:     Texture, turgor normal. No rashes or lesions noted  Neurologic: Sedated, unable to participate in neuro exam, no clonus on exam (after sedatives given)  Psychiatry:      Unable to assess  Heme/Lymph/Immune: No petechiae, echymoses, overt signs of bleeding or lymphadenopathy.       Data Review:   Recent Results (from the past 24 hour(s))   AMMONIA    Collection Time: 17  6:12 PM   Result Value Ref Range    Ammonia 17 11 - 32 UMOL/L   CBC WITH AUTOMATED DIFF    Collection Time: 17  6:12 PM   Result Value Ref Range    WBC 8.4 4.3 - 11.1 K/uL    RBC 4.54 4.05 - 5.25 M/uL    HGB 13.5 11.7 - 15.4 g/dL    HCT 39.9 35.8 - 46.3 %    MCV 87.9 79.6 - 97.8 FL    MCH 29.7 26.1 - 32.9 PG    MCHC 33.8 31.4 - 35.0 g/dL    RDW 12.4 11.9 - 14.6 %    PLATELET 170 170 - 251 K/uL    MPV 10.3 (L) 10.8 - 14.1 FL    DF AUTOMATED      NEUTROPHILS 43 43 - 78 %    LYMPHOCYTES 39 13 - 44 %    MONOCYTES 16 (H) 4.0 - 12.0 %    EOSINOPHILS 2 0.5 - 7.8 %    BASOPHILS 0 0.0 - 2.0 %    IMMATURE GRANULOCYTES 0.2 0.0 - 5.0 %    ABS. NEUTROPHILS 3.7 1.7 - 8.2 K/UL    ABS. LYMPHOCYTES 3.2 0.5 - 4.6 K/UL    ABS. MONOCYTES 1.3 0.1 - 1.3 K/UL    ABS. EOSINOPHILS 0.2 0.0 - 0.8 K/UL    ABS. BASOPHILS 0.0 0.0 - 0.2 K/UL    ABS. IMM. GRANS. 0.0 0.0 - 0.5 K/UL   METABOLIC PANEL, COMPREHENSIVE    Collection Time: 04/27/17  6:12 PM   Result Value Ref Range    Sodium 137 136 - 145 mmol/L    Potassium 3.9 3.5 - 5.1 mmol/L    Chloride 100 98 - 107 mmol/L    CO2 27 21 - 32 mmol/L    Anion gap 10 7 - 16 mmol/L    Glucose 103 (H) 65 - 100 mg/dL    BUN 13 6 - 23 MG/DL    Creatinine 1.04 (H) 0.6 - 1.0 MG/DL    GFR est AA >60 >60 ml/min/1.73m2    GFR est non-AA >60 >60 ml/min/1.73m2    Calcium 8.7 8.3 - 10.4 MG/DL    Bilirubin, total 0.8 0.2 - 1.1 MG/DL    ALT (SGPT) 334 (H) 12 - 65 U/L    AST (SGOT) 238 (H) 15 - 37 U/L    Alk.  phosphatase 243 (H) 50 - 136 U/L    Protein, total 7.9 6.3 - 8.2 g/dL    Albumin 4.4 3.5 - 5.0 g/dL    Globulin 3.5 2.3 - 3.5 g/dL    A-G Ratio 1.3 1.2 - 3.5     CK    Collection Time: 04/27/17  6:12 PM   Result Value Ref Range     21 - 215 U/L   ETHYL ALCOHOL    Collection Time: 04/27/17  6:12 PM   Result Value Ref Range    ALCOHOL(ETHYL),SERUM <3 MG/DL   SALICYLATE    Collection Time: 04/27/17  6:12 PM   Result Value Ref Range    SALICYLATE 1.6 (L) 10 - 20 MG/DL   MAGNESIUM    Collection Time: 04/27/17  6:12 PM   Result Value Ref Range    Magnesium 1.9 1.8 - 2.4 mg/dL   PHOSPHORUS    Collection Time: 04/27/17  6:12 PM   Result Value Ref Range    Phosphorus 4.7 (H) 2.5 - 4.5 MG/DL   TSH 3RD GENERATION    Collection Time: 04/27/17  6:12 PM   Result Value Ref Range    TSH 6.786 (H) 0.358 - 3.740 uIU/mL ACETAMINOPHEN    Collection Time: 04/27/17  6:12 PM   Result Value Ref Range    ACETAMINOPHEN 0 (L) 10.0 - 30.0 ug/mL   POC LACTIC ACID    Collection Time: 04/27/17  6:19 PM   Result Value Ref Range    Lactic Acid (POC) 0.9 0.5 - 1.9 mmol/L   DRUG SCREEN, URINE    Collection Time: 04/27/17  8:25 PM   Result Value Ref Range    PCP(PHENCYCLIDINE) NEGATIVE       BENZODIAZEPINE NEGATIVE       COCAINE NEGATIVE       AMPHETAMINE NEGATIVE       METHADONE NEGATIVE       THC (TH-CANNABINOL) NEGATIVE       OPIATES NEGATIVE       BARBITURATES NEGATIVE      URINE MICROSCOPIC    Collection Time: 04/27/17  8:25 PM   Result Value Ref Range    WBC 0-3 0 /hpf    RBC 0 0 /hpf    Epithelial cells 0-3 0 /hpf    Bacteria 0 0 /hpf    Casts 0 0 /lpf   HCG URINE, QL. - POC    Collection Time: 04/27/17  8:26 PM   Result Value Ref Range    Pregnancy test,urine (POC) NEGATIVE  NEG         Imaging /Procedures /Studies   CT HEAD WO CONT   Final Result   Impression: Unremarkable unenhanced CT scan of the brain. XR ABD ACUTE W 1 V CHEST   Final Result   IMPRESSION: Mildly distended colon with moderate amount of fecal material.              Assessment and Plan: Active Hospital Problems    Diagnosis Date Noted    Serotonin syndrome 04/27/2017    Altered mental status 04/27/2017    Polysubstance abuse 04/27/2017    Elevated LFTs 04/27/2017       PLAN  - AMS: concern for serotonin syndrome. Will stop effexor, zofran, remeron which can contribute to serotonin syndrome. Will hold haldol and zyprexa as well. When mental status improves, will need psych to re-assess about restarting these medications. Valium and Ativan IV prn    - Elevated LFTs: normal ammonia level. RUQ us pending. Repeat LFTs, check INR in AM    - Low BP: unclear baseline, but appears dry on exam. Will start gentle IVF. Continue to monitor closely    - Unclear if she has a seizure d/o or if keppra added for concern of withdrawal seizure.  Will continue for now. Convert to IV if unable to take po    Code Status: FULL  DVT Prophylaxis: Lovenox    Anticipated discharge: 2-3 days      Kendrick Bean MD  8:06 PM

## 2017-04-28 NOTE — PROGRESS NOTES
Patient remains restless, combative, having visual hallucinations. Multiple attempts to stand up in bed and crawl over side rails. Austen Hernandez RN at bedside assisting this RN to prevent patient from falling.

## 2017-04-28 NOTE — PROGRESS NOTES
Patient remains restless. Patient has not voided since admission. Bladder scan revealed approximately 850 ml urine. Assisted patient to bathroom, patient voided without difficulty.

## 2017-04-28 NOTE — PROGRESS NOTES
Problem: Interdisciplinary Rounds  Goal: Interdisciplinary Rounds  Interdisciplinary team rounds were held 4/28/2017 with the following team members:Care Management, Nursing, Nurse Practitioner, Nutrition, Pastoral Care, Pharmacy and Physician and the patient was not able to participate. Plan of care discussed. See clinical pathway and/or care plan for interventions and desired outcomes.

## 2017-04-28 NOTE — PROGRESS NOTES
Patient continuous to be confused and restless. Mumbling constantly and talking to an imaginary person, calling \"Ken, Ken\" loudly. Ativan 2 mg slow IVP given as ordered for agitation and prior to ultrasound of abdomen. Will monitor.

## 2017-04-28 NOTE — PROGRESS NOTES
Ativan 2 mg given by primary RN at [address] [place] [address]. Patient remains agitated, cursing staff and attempting to kick staff. Patient constantly sitting up in bed, attempting to throw legs over side rails. Patient denies pain and denies need to use bathroom. Bilateral soft wrist restraints remain in place. Sitter at bedside.

## 2017-04-29 VITALS
HEART RATE: 80 BPM | OXYGEN SATURATION: 99 % | RESPIRATION RATE: 18 BRPM | WEIGHT: 130 LBS | HEIGHT: 67 IN | TEMPERATURE: 96.8 F | BODY MASS INDEX: 20.4 KG/M2 | SYSTOLIC BLOOD PRESSURE: 103 MMHG | DIASTOLIC BLOOD PRESSURE: 66 MMHG

## 2017-04-29 LAB
ALBUMIN SERPL BCP-MCNC: 3.3 G/DL (ref 3.5–5)
ALBUMIN/GLOB SERPL: 1.1 {RATIO} (ref 1.2–3.5)
ALP SERPL-CCNC: 178 U/L (ref 50–136)
ALT SERPL-CCNC: 187 U/L (ref 12–65)
ANION GAP BLD CALC-SCNC: 10 MMOL/L (ref 7–16)
AST SERPL W P-5'-P-CCNC: 96 U/L (ref 15–37)
BILIRUB SERPL-MCNC: 0.8 MG/DL (ref 0.2–1.1)
BUN SERPL-MCNC: 11 MG/DL (ref 6–23)
CALCIUM SERPL-MCNC: 8.1 MG/DL (ref 8.3–10.4)
CHLORIDE SERPL-SCNC: 105 MMOL/L (ref 98–107)
CO2 SERPL-SCNC: 25 MMOL/L (ref 21–32)
CREAT SERPL-MCNC: 0.84 MG/DL (ref 0.6–1)
GLOBULIN SER CALC-MCNC: 3.1 G/DL (ref 2.3–3.5)
GLUCOSE SERPL-MCNC: 101 MG/DL (ref 65–100)
POTASSIUM SERPL-SCNC: 3.6 MMOL/L (ref 3.5–5.1)
PROT SERPL-MCNC: 6.4 G/DL (ref 6.3–8.2)
SODIUM SERPL-SCNC: 140 MMOL/L (ref 136–145)

## 2017-04-29 PROCEDURE — 80053 COMPREHEN METABOLIC PANEL: CPT | Performed by: INTERNAL MEDICINE

## 2017-04-29 PROCEDURE — 74011250636 HC RX REV CODE- 250/636: Performed by: INTERNAL MEDICINE

## 2017-04-29 PROCEDURE — 36415 COLL VENOUS BLD VENIPUNCTURE: CPT | Performed by: INTERNAL MEDICINE

## 2017-04-29 RX ORDER — DIAZEPAM 10 MG/2ML
10 INJECTION INTRAMUSCULAR ONCE
Status: COMPLETED | OUTPATIENT
Start: 2017-04-29 | End: 2017-04-29

## 2017-04-29 RX ADMIN — DIAZEPAM 10 MG: 5 INJECTION, SOLUTION INTRAMUSCULAR; INTRAVENOUS at 04:23

## 2017-04-29 RX ADMIN — DIAZEPAM 10 MG: 5 INJECTION, SOLUTION INTRAMUSCULAR; INTRAVENOUS at 00:45

## 2017-04-29 RX ADMIN — Medication 10 ML: at 06:18

## 2017-04-29 RX ADMIN — SODIUM CHLORIDE 125 ML/HR: 900 INJECTION, SOLUTION INTRAVENOUS at 10:27

## 2017-04-29 RX ADMIN — LORAZEPAM 2 MG: 2 INJECTION INTRAMUSCULAR; INTRAVENOUS at 03:19

## 2017-04-29 NOTE — PROGRESS NOTES
Valium 10mg given slowly via IVP for agitation was not effective. Pt still sgitated, combative and having visual hallucination.

## 2017-04-29 NOTE — DISCHARGE SUMMARY
Hospitalist Discharge Summary     Patient ID:  Tyler Jerome  [id number]  13 y.o.  1980  Admit date: 4/27/2017  5:04 PM  Discharge date and time: 4/29/2017  Attending: Cresencio Woody MD  PCP:  Otis Orr MD  Treatment Team: Attending Provider: Cresencio Woody MD; Utilization Review: Javi Tate    Principal Diagnosis Serotonin syndrome   Principal Problem:    Serotonin syndrome (4/27/2017)    Active Problems:    Altered mental status (4/27/2017)      Polysubstance abuse (4/27/2017)      Elevated LFTs (4/27/2017)     Hospital Course:  Please refer to the admission H&P for details of presentation. In summary, the patient is a 29 yo CF with a history of polysubstance abuse (meth, heroin, marijuana), bipolar 2, who presented to the [facility] ED from [facility] with altered mental status, confusion and agitation x 2 days. She has been voluntarily admitted to [facility] since 4/3 for detox. She was started on multiple medications at [facility] including suboxone, haldol, keppra, ativan, effexor, remeron, zyprexa and zofran. Upon presentation to the ED, she was agitated and intermittently having myoclonic jerking of her upper extremities. She was also noted to have significant increase in her LFTs since admission to [facility]. UDS neg on admission. She was admitted and there was concern for seritonin syndrome which was ruled out. Telepsychiatrist recommened restarting Suboxone, Seroquel, Remeron and trazodone. LFTs trended down and US unremarkable. Report from nursing she got out of bed and tried to lock herself in the bathroom and had the restraints around her neck. Combative, agitated, attempting to hit staff. Placed on involuntary commitment papers. Telepsychiatry evalueated patient and recommened continued inpt psych unit on involuntary status. Please see his note. Med recommendations below. Patient to be transferred back in stable condition.      Significant Diagnostic Studies:   CT head: NAD     XR ABD Acute: Mildly distended colon with moderate amount of fecal material.     RIGHT UPPER QUADRANT ULTRASOUND 4/28/2017: No acute abdominal findings status post cholecystectomy. Labs: Results:       Chemistry Recent Labs      04/29/17 0446  04/28/17   0540  04/27/17   1812   GLU  101*  83  103*   NA  140  143  137   K  3.6  3.6  3.9   CL  105  107  100   CO2  25  27  27   BUN  11  10  13   CREA  0.84  0.85  1.04*   CA  8.1*  8.4  8.7   AGAP  10  9  10   AP  178*  208*  243*   TP  6.4  6.4  7.9   ALB  3.3*  3.6  4.4   GLOB  3.1  2.8  3.5   AGRAT  1.1*  1.3  1.3      CBC w/Diff Recent Labs      04/28/17   0540  04/27/17   1812   WBC  5.4  8.4   RBC  3.99*  4.54   HGB  11.7  13.5   HCT  35.4*  39.9   PLT  211  232   GRANS  51  43   LYMPH  34  39   EOS  2  2      Cardiac Enzymes Recent Labs      04/27/17   1812   CPK  160      Coagulation Recent Labs      04/28/17   0540   PTP  10.7   INR  1.0       Lipid Panel No results found for: CHOL, CHOLPOCT, CHOLX, CHLST, CHOLV, 396730, HDL, LDL, NLDLCT, DLDL, LDLC, DLDLP, 135376, VLDLC, VLDL, TGL, TGLX, TRIGL, [id number], TRIGP, TGLPOCT, [id number], CHHD, CHHDX   BNP No results for input(s): BNPP in the last 72 hours. Liver Enzymes Recent Labs      04/29/17 0446   TP  6.4   ALB  3.3*   AP  178*   SGOT  96*      Thyroid Studies Lab Results   Component Value Date/Time    TSH 6.786 04/27/2017 06:12 PM            Discharge Exam:  Visit Vitals    /66 (BP 1 Location: Left arm, BP Patient Position: At rest)    Pulse 80    Temp 96.8 °F (36 °C)    Resp 18    Ht 5' 7\" (1.702 m)    Wt 59 kg (130 lb)    SpO2 99%    BMI 20.36 kg/m2     General: awakes to verbal command. No acute distress. Eye: EOMI, Normal conjunctiva. HENT: Normocephalic, atraumatic, Normal hearing. Gastrointestinal: Soft, Non-tender  Musculoskeletal:  No tenderness, no edema. Integumentary: Warm, Dry, Pink, no rash. Neurologic: Alert, Oriented x3, with memory deficits. No focal deficits.    Psychiatric: Cooperative Disposition: [facility]  Discharge Condition: stable  Patient Instructions:   Current Discharge Medication List      CONTINUE these medications which have NOT CHANGED    Details   levETIRAcetam (KEPPRA) 500 mg tablet Take 500 mg by mouth two (2) times a day. mirtazapine (REMERON) 15 mg tablet Take 15 mg by mouth nightly. ondansetron hcl (ZOFRAN, AS HYDROCHLORIDE,) 8 mg tablet Take 8 mg by mouth every six (6) hours as needed for Nausea. senna (SENNA) 8.6 mg tablet Take 1 Tab by mouth two (2) times a day. traZODone (DESYREL) 50 mg tablet Take 50 mg by mouth nightly as needed for Sleep.      magnesium citrate solution Take 296 mL by mouth daily as needed. STOP taking these medications       haloperidol (HALDOL) 10 mg tablet Comments:   Reason for Stopping:         lactulose (CHRONULAC) 10 gram/15 mL solution Comments:   Reason for Stopping:         LORazepam (ATIVAN) 1 mg tablet Comments:   Reason for Stopping:         OLANZapine (ZYPREXA) 10 mg tablet Comments:   Reason for Stopping:           Dr. Emerita Holguin recommends adding the following medications:  Valium 5 mg po q4 hrs prn anxiolytic withdrawal  subutex 4 mg SL q6hrs mg prn opiate withdrawal,   seroquel 25 mg po qam 25 mg po qpm & 100 mg po hs for bipolar disorder, mood stabilization and psychosis. trazadone 100mg po hs prn sleep    Activity: Activity as tolerated  Diet: Regular Diet  Wound Care: None needed    Discharge Instructions  - Follow up with primary care physician  - Call with any problems or questions. - Take the listed and prescribed medications as directed. - Return for worsening symptoms     Patient was seen, examined and stable prior to discharge. 35 minutes was spent in the discharge of this patient; at bedside, explaining the instructions, reviewing notes, discussing the case. Patient verbalizes understanding of the instructions and questions were answered to complete satisfaction.  Patient is aware of the need to follow up and take medications as prescribed.     Signed:  Stevie Sandhu PA-C, MPAS  4/29/2017  6:15 PM

## 2017-04-29 NOTE — PROGRESS NOTES
Patient yelling profanities at staff,confused, attempting to throw herself out of the bed and harm staff. Placed call to Dr. Lamberto Meadeure her of pts condition, new orders received for ankle restraints and to go ahead and give dose of Valium 10mg IV now.

## 2017-04-29 NOTE — PROGRESS NOTES
Pt assisted to bathroom  at this time and voided 150ml of UOP. Pt back in bed and sitter at bedside. Will continue to monitor.

## 2017-04-29 NOTE — PROGRESS NOTES
Hospitalist Progress Note    2017  Admit Date: 2017  5:04 PM   NAME: Linda Talavera   :  1980   MRN:  [id number]   Attending: Royal Garcia MD  PCP:  Nuno Urena MD    SUBJECTIVE:   Patient is a 29 yo CF with a history of polysubstance abuse (meth, heroin, marijuana), bipolar 2, who presented to the [facility] ED from [facility] with altered mental status, confusion and agitation x 2 days. She has been voluntarily admitted to [facility] since 4/3 for detox. She was started on multiple medications at [facility] including suboxone, haldol, keppra, ativan, effexor, remeron, zyprexa and zofran. Upon presentation to the ED, she was agitated and intermittently having myoclonic jerking of her upper extremities. She was also noted to have significant increase in her LFTs since admission to [facility].     2017: Sedated and in soft restraints. Events overnight:  Report from nursing she got out of bed and tried to lock herself in the bathroom and had the restraints around her neck. Combative, agitated, attempting to hit staff. Valium given. Unable to obtain Review of Systems due to altered mental status. PHYSICAL EXAM     Visit Vitals    BP 96/66 (BP 1 Location: Left arm)  Comment: primary RN notified    Pulse 75    Temp 97.3 °F (36.3 °C)    Resp 16    Ht 5' 7\" (1.702 m)    Wt 59 kg (130 lb)    SpO2 97%    BMI 20.36 kg/m2      Temp (24hrs), Av °F (36.1 °C), Min:96.6 °F (35.9 °C), Max:97.3 °F (36.3 °C)    Oxygen Therapy  O2 Sat (%): 97 % (17 0658)  Pulse via Oximetry: 86 beats per minute (17 2105)  O2 Device: Room air (17 1706)    Intake/Output Summary (Last 24 hours) at 17 0920  Last data filed at 17 0046   Gross per 24 hour   Intake                0 ml   Output             1150 ml   Net            -1150 ml      General: Sedated, not alert. NAD. HENT: Normocephalic, atraumatic. Neck: Supple, Non-tender. Respiratory: CTA b/l. Respirations are non-labored. Cardiovascular: Normal rate, Regular rhythm, No murmur. Gastrointestinal: Soft, Non-tender, nondistended, positive bowel sounds   Musculoskeletal: No cyanosis, clubbing or edema. Integumentary: Warm, Dry, Pink, no rash. Neurologic: limited. IMAGING   CT head: NAD    XR ABD Acute: Mildly distended colon with moderate amount of fecal material.    RIGHT UPPER QUADRANT ULTRASOUND 4/28/2017: No acute abdominal findings status post cholecystectomy. ASSESSMENT      AMS  - concern for serotonin syndrome likely ruled out.    - UDS neg on admission.   - Psych consulted and recommends restarting Suboxone, Seroquel, Remeron and trazodone.    -  reviewed showed she was prescribed suboxone last in June 2016 SUBOXONE 4 MG-1 MG SL FILM. QTY 21 for 7 days. - tsh elevated. Free T4 wnl. - Repeat TFTs with PCP as OP in 4-6 weeks. - Unclear if she has a seizure d/o or if keppra added for concern of withdrawal seizure. Will continue for now. Convert to IV if unable to take po.  - IV ativan prn (valium on back order)    SI  - Commitment papers placed on chart. Elevated LFTs  - improving  - normal ammonia level.   - RUQ us normal.   - INR wnl    Hypotension  - improving  - continue ivfs    Constipation.   - Continue bowel regimen; stool softeners and prn laxatives. DVT prophylaxis: lovenox  Full Code  Discussed with Dr. Rosa Thompson, Dr. Russell Cosme. (Psych)  Dispo: to [facility] when stable. Total Time spent: 35 minutes. Counseling & coordinating care dominated the encounter >55%. Reviewed prior records, labs, vitals, diagnostic tests, flow sheet, home medications, inpatient medications, nursing and provider notes.     Renetta Gilliam PA-C, MPAS

## 2017-04-29 NOTE — PROGRESS NOTES
Shift assessment completed via flowsheet. Pt is confused, restless and combative. Respiration even and unlabored with no SOB noted. Lungs sounds clear bilaterally. HR regular with S1, S2 noted on auscultation. Abd soft with active BS on all four quadrants. Bilateral wrist restraint in place, circulation good and no trauma or injury noted. Pt currently in bed, bed low locked position, side rails up x3, call light and personal belongings within reach. Door open for observation and sitter currently with patient.

## 2017-04-29 NOTE — PROGRESS NOTES
Pt assisted to the bathroom at this time. Pt states she is hungry, turkey sandwich and fluid over and pt is eating comfortably in her bed. Sitter at bedside, no distress noted at this time. Will continue to monitor.

## 2019-04-25 ENCOUNTER — APPOINTMENT (OUTPATIENT)
Dept: CT IMAGING | Age: 39
End: 2019-04-25
Attending: EMERGENCY MEDICINE
Payer: MEDICARE

## 2019-04-25 ENCOUNTER — HOSPITAL ENCOUNTER (EMERGENCY)
Age: 39
Discharge: HOME | End: 2019-04-26
Attending: EMERGENCY MEDICINE
Payer: MEDICARE

## 2019-04-25 DIAGNOSIS — G24.02 DYSTONIC DRUG REACTION: Primary | ICD-10-CM

## 2019-04-25 LAB
ANION GAP SERPL CALC-SCNC: 8 MMOL/L (ref 7–16)
BASOPHILS # BLD: 0 K/UL (ref 0–0.2)
BASOPHILS NFR BLD: 0 % (ref 0–2)
BUN SERPL-MCNC: 9 MG/DL (ref 6–23)
CALCIUM SERPL-MCNC: 9 MG/DL (ref 8.3–10.4)
CHLORIDE SERPL-SCNC: 105 MMOL/L (ref 98–107)
CO2 SERPL-SCNC: 27 MMOL/L (ref 21–32)
CREAT SERPL-MCNC: 1.14 MG/DL (ref 0.6–1)
DIFFERENTIAL METHOD BLD: NORMAL
EOSINOPHIL # BLD: 0.1 K/UL (ref 0–0.8)
EOSINOPHIL NFR BLD: 2 % (ref 0.5–7.8)
ERYTHROCYTE [DISTWIDTH] IN BLOOD BY AUTOMATED COUNT: 12.9 % (ref 11.9–14.6)
GLUCOSE SERPL-MCNC: 79 MG/DL (ref 65–100)
HCT VFR BLD AUTO: 37.8 % (ref 35.8–46.3)
HGB BLD-MCNC: 12.3 G/DL (ref 11.7–15.4)
IMM GRANULOCYTES # BLD AUTO: 0 K/UL (ref 0–0.5)
IMM GRANULOCYTES NFR BLD AUTO: 0 % (ref 0–5)
LYMPHOCYTES # BLD: 1.7 K/UL (ref 0.5–4.6)
LYMPHOCYTES NFR BLD: 23 % (ref 13–44)
MCH RBC QN AUTO: 28.9 PG (ref 26.1–32.9)
MCHC RBC AUTO-ENTMCNC: 32.5 G/DL (ref 31.4–35)
MCV RBC AUTO: 88.9 FL (ref 79.6–97.8)
MONOCYTES # BLD: 0.6 K/UL (ref 0.1–1.3)
MONOCYTES NFR BLD: 8 % (ref 4–12)
NEUTS SEG # BLD: 5.1 K/UL (ref 1.7–8.2)
NEUTS SEG NFR BLD: 67 % (ref 43–78)
NRBC # BLD: 0 K/UL (ref 0–0.2)
PLATELET # BLD AUTO: 239 K/UL (ref 150–450)
PMV BLD AUTO: 9.8 FL (ref 9.4–12.3)
POTASSIUM SERPL-SCNC: 3.6 MMOL/L (ref 3.5–5.1)
RBC # BLD AUTO: 4.25 M/UL (ref 4.05–5.2)
SODIUM SERPL-SCNC: 140 MMOL/L (ref 136–145)
WBC # BLD AUTO: 7.6 K/UL (ref 4.3–11.1)

## 2019-04-25 PROCEDURE — 99285 EMERGENCY DEPT VISIT HI MDM: CPT | Performed by: EMERGENCY MEDICINE

## 2019-04-25 PROCEDURE — 85025 COMPLETE CBC W/AUTO DIFF WBC: CPT

## 2019-04-25 PROCEDURE — 80048 BASIC METABOLIC PNL TOTAL CA: CPT

## 2019-04-25 PROCEDURE — 81003 URINALYSIS AUTO W/O SCOPE: CPT | Performed by: EMERGENCY MEDICINE

## 2019-04-25 PROCEDURE — 70450 CT HEAD/BRAIN W/O DYE: CPT

## 2019-04-25 PROCEDURE — 80307 DRUG TEST PRSMV CHEM ANLYZR: CPT

## 2019-04-25 PROCEDURE — 81025 URINE PREGNANCY TEST: CPT

## 2019-04-25 PROCEDURE — 74011250637 HC RX REV CODE- 250/637: Performed by: EMERGENCY MEDICINE

## 2019-04-25 RX ORDER — DIPHENHYDRAMINE HCL 50 MG
50 CAPSULE ORAL
Status: COMPLETED | OUTPATIENT
Start: 2019-04-25 | End: 2019-04-25

## 2019-04-25 RX ORDER — DIAZEPAM 5 MG/1
5 TABLET ORAL
Status: COMPLETED | OUTPATIENT
Start: 2019-04-25 | End: 2019-04-25

## 2019-04-25 RX ADMIN — DIAZEPAM 5 MG: 5 TABLET ORAL at 23:08

## 2019-04-25 RX ADMIN — DIPHENHYDRAMINE HYDROCHLORIDE 50 MG: 50 CAPSULE ORAL at 21:26

## 2019-04-26 VITALS
DIASTOLIC BLOOD PRESSURE: 72 MMHG | OXYGEN SATURATION: 96 % | RESPIRATION RATE: 16 BRPM | BODY MASS INDEX: 27.78 KG/M2 | SYSTOLIC BLOOD PRESSURE: 107 MMHG | HEART RATE: 72 BPM | HEIGHT: 67 IN | TEMPERATURE: 98 F | WEIGHT: 177 LBS

## 2019-04-26 LAB
AMPHET UR QL SCN: NEGATIVE
BARBITURATES UR QL SCN: NEGATIVE
BENZODIAZ UR QL: NEGATIVE
CANNABINOIDS UR QL SCN: NEGATIVE
COCAINE UR QL SCN: NEGATIVE
HCG UR QL: NEGATIVE
METHADONE UR QL: NEGATIVE
OPIATES UR QL: NEGATIVE
PCP UR QL: NEGATIVE

## 2019-04-26 NOTE — DISCHARGE INSTRUCTIONS
Patient Education        Tardive Dyskinesia (TD): Care Instructions  Your Care Instructions    Tardive dyskinesia (TD) is a movement disorder. It's caused by using medicines called antipsychotics, often for a long time. Doctors use these medicines to treat mental health disorders such as schizophrenia. Some people can take these medicines without getting TD. But for those people who do get it, the symptoms can cause distress. TD causes a person to repeat the same movement over and over without being able to stop. If you have TD, you might have symptoms such as:  · Repeated chewing motions. · Smacking your lips. · Thrusting your tongue out of your mouth. · Twitching your tongue. · Quick and jerky movements (tics) of your head. Treatment depends on how much you need the medicine that causes the symptoms. If symptoms are causing big problems for you, your doctor may have you lower the dose or stop the medicine. Or your doctor may switch you to a different medicine. Other medicines sometimes can help relieve the TD symptoms. But you may still have symptoms, even if you stop taking the antipsychotic medicine. Follow-up care is a key part of your treatment and safety. Be sure to make and go to all appointments, and call your doctor if you are having problems. It's also a good idea to know your test results and keep a list of the medicines you take. How can you care for yourself at home? · Be safe with medicines. Take your medicines exactly as prescribed. Call your doctor if you think you are having a problem with your medicine. · Don't stop taking your medicine unless you and your doctor have discussed how this change might affect you. If you have trouble taking your medicine or feel that you don't need to take it, talk to your doctor. Your doctor may be able to change the medicine or the amount you take. · Try not to isolate yourself if you are self-conscious about the uncontrolled motion.  Tell your family and friends about TD and how it affects you. · If you haven't done so yet, talk to your doctor about treatment for your TD symptoms. · Ask your doctor, counselor, or other health professional for help finding a support group. Look for one that works for you. It can help to talk to others who have dealt with the same problems as you. When should you call for help? Watch closely for changes in your health, and be sure to contact your doctor if:    · You have new TD symptoms, or your symptoms get worse.     · You do not get better as expected. Where can you learn more? Go to http://micky-miguelangel.info/. Enter T105 in the search box to learn more about \"Tardive Dyskinesia (TD): Care Instructions. \"  Current as of: September 11, 2018  Content Version: 11.9  © 3679-4955 [employer]. Care instructions adapted under license by [employer] (which disclaims liability or warranty for this information). If you have questions about a medical condition or this instruction, always ask your healthcare professional. [address] any warranty or liability for your use of this information.

## 2019-04-26 NOTE — ED TRIAGE NOTES
Patient presents with complaints of slurred speech, facial twitching, and tingling from head to toe that began 2 days ago. Patient states she has a hx of seizures but has been out of her Keppra. Dr Jazmine Ochoa in Triage. After Dr Dandre Valdes arrival, patient stated she was having an allergic reaction.

## 2019-04-26 NOTE — ED NOTES
I have reviewed discharge instructions with the patient. The patient verbalized understanding. Patient left ED via Discharge Method: ambulatory to Home with self Opportunity for questions and clarification provided. Patient given 0 scripts. To continue your aftercare when you leave the hospital, you may receive an automated call from our care team to check in on how you are doing. This is a free service and part of our promise to provide the best care and service to meet your aftercare needs.  If you have questions, or wish to unsubscribe from this service please call [phone]. Thank you for Choosing our [facility] Emergency Department.

## 2019-04-26 NOTE — ED PROVIDER NOTES
Patient seen in triage. Reports a 2 day history of difficulty speaking and facial twitching. Reports numbness and tingling all over. On exam, no focal deficits, facial muscles twitching noted. History of psychiatric disease. We'll give a dose of by mouth Benadryl in case symptoms are related to a dystonic reaction. Triage examination and history was performed by me, further examination and history to be performed by other provider. Michael Petty MD 
 
 
The history is provided by the patient. Dysarthria This is a new problem. The current episode started more than 2 days ago. There was left facial focality noted. There has been no fever. Pertinent negatives include no chest pain, no vomiting, no altered mental status, no confusion and no nausea. Past Medical History:  
Diagnosis Date  Anxiety and depression   
 anxiety, depression  Back pain   
 problems with L4-5 vertebra  Common bile duct dilatation 6/12/2013  Depression  Gall bladder disease 6/12/2013  Psychiatric disorder Past Surgical History:  
Procedure Laterality Date  HX CHOLECYSTECTOMY  HX ORTHOPAEDIC  2007  
 left foot, tonail excision Family History:  
Problem Relation Age of Onset  Cancer Paternal Grandmother   
     skin cancer Social History Socioeconomic History  Marital status:  Spouse name: Not on file  Number of children: Not on file  Years of education: Not on file  Highest education level: Not on file Occupational History  Not on file Social Needs  Financial resource strain: Not on file  Food insecurity:  
  Worry: Not on file Inability: Not on file  Transportation needs:  
  Medical: Not on file Non-medical: Not on file Tobacco Use  Smoking status: Never Smoker Substance and Sexual Activity  Alcohol use: No  
 Drug use: No  
 Sexual activity: Never Birth control/protection: None Lifestyle  Physical activity: Days per week: Not on file Minutes per session: Not on file  Stress: Not on file Relationships  Social connections:  
  Talks on phone: Not on file Gets together: Not on file Attends Cheondoism service: Not on file Active member of club or organization: Not on file Attends meetings of clubs or organizations: Not on file Relationship status: Not on file  Intimate partner violence:  
  Fear of current or ex partner: Not on file Emotionally abused: Not on file Physically abused: Not on file Forced sexual activity: Not on file Other Topics Concern  Not on file Social History Narrative  Not on file ALLERGIES: Patient has no known allergies. Review of Systems Constitutional: Negative for fatigue, fever and unexpected weight change. HENT: Negative for congestion and dental problem. Eyes: Negative for photophobia and redness. Respiratory: Negative for chest tightness. Cardiovascular: Negative for chest pain and leg swelling. Gastrointestinal: Negative for abdominal pain, nausea and vomiting. Endocrine: Negative for polyphagia and polyuria. Genitourinary: Negative for urgency. Musculoskeletal: Negative for back pain and joint swelling. Skin: Negative for color change and pallor. Allergic/Immunologic: Negative for food allergies. Neurological: Negative for dizziness and facial asymmetry. Hematological: Negative for adenopathy. Psychiatric/Behavioral: Negative for confusion. All other systems reviewed and are negative. Vitals:  
 04/25/19 2113 BP: 110/77 Pulse: 93 Resp: 16 Temp: 98 °F (36.7 °C) SpO2: 96% Weight: 80.3 kg (177 lb) Height: 5' 7\" (1.702 m) Physical Exam  
Constitutional: She is oriented to person, place, and time. She appears well-developed and well-nourished. Eyes: Pupils are equal, round, and reactive to light. Conjunctivae and EOM are normal.  
Neck: Normal range of motion.  Neck supple. No tracheal deviation present. No thyromegaly present. Cardiovascular: Normal rate and regular rhythm. Exam reveals no friction rub. No murmur heard. Pulmonary/Chest: Effort normal and breath sounds normal. No stridor. No respiratory distress. Abdominal: Soft. Bowel sounds are normal. She exhibits no distension. There is no tenderness. Neurological: She is alert and oriented to person, place, and time. No cranial nerve deficit. Nursing note and vitals reviewed. MDM Number of Diagnoses or Management Options Dystonic drug reaction:  
Diagnosis management comments: Labs and CT of head obtained. On reassessment, patient appears to have left facial twitching. We'll give the patient a dose of Valium here as well. Continue monitor symptoms. 1:09 AM 
Sleeping upon my reassessment. Facial twitching  seemed to resolve. Appears stable for discharge and outpatient follow-up with PCP as well as mental health Amount and/or Complexity of Data Reviewed Clinical lab tests: ordered and reviewed Tests in the radiology section of CPT®: ordered and reviewed Risk of Complications, Morbidity, and/or Mortality Presenting problems: moderate Diagnostic procedures: low Management options: low Patient Progress Patient progress: stable Procedures Results Include: 
 
Recent Results (from the past 24 hour(s)) CBC WITH AUTOMATED DIFF Collection Time: 04/25/19  9:26 PM  
Result Value Ref Range WBC 7.6 4.3 - 11.1 K/uL  
 RBC 4.25 4.05 - 5.2 M/uL  
 HGB 12.3 11.7 - 15.4 g/dL HCT 37.8 35.8 - 46.3 % MCV 88.9 79.6 - 97.8 FL  
 MCH 28.9 26.1 - 32.9 PG  
 MCHC 32.5 31.4 - 35.0 g/dL  
 RDW 12.9 11.9 - 14.6 % PLATELET 704 442 - 148 K/uL MPV 9.8 9.4 - 12.3 FL ABSOLUTE NRBC 0.00 0.0 - 0.2 K/uL  
 DF AUTOMATED NEUTROPHILS 67 43 - 78 % LYMPHOCYTES 23 13 - 44 % MONOCYTES 8 4.0 - 12.0 % EOSINOPHILS 2 0.5 - 7.8 % BASOPHILS 0 0.0 - 2.0 %  IMMATURE GRANULOCYTES 0 0.0 - 5.0 %  
 ABS. NEUTROPHILS 5.1 1.7 - 8.2 K/UL  
 ABS. LYMPHOCYTES 1.7 0.5 - 4.6 K/UL  
 ABS. MONOCYTES 0.6 0.1 - 1.3 K/UL  
 ABS. EOSINOPHILS 0.1 0.0 - 0.8 K/UL  
 ABS. BASOPHILS 0.0 0.0 - 0.2 K/UL  
 ABS. IMM. GRANS. 0.0 0.0 - 0.5 K/UL METABOLIC PANEL, BASIC Collection Time: 04/25/19  9:26 PM  
Result Value Ref Range Sodium 140 136 - 145 mmol/L Potassium 3.6 3.5 - 5.1 mmol/L Chloride 105 98 - 107 mmol/L  
 CO2 27 21 - 32 mmol/L Anion gap 8 7 - 16 mmol/L Glucose 79 65 - 100 mg/dL BUN 9 6 - 23 MG/DL Creatinine 1.14 (H) 0.6 - 1.0 MG/DL  
 GFR est AA >60 >60 ml/min/1.73m2 GFR est non-AA 57 (L) >60 ml/min/1.73m2 Calcium 9.0 8.3 - 10.4 MG/DL  
DRUG SCREEN, URINE Collection Time: 04/25/19 11:02 PM  
Result Value Ref Range PCP(PHENCYCLIDINE) NEGATIVE BENZODIAZEPINES NEGATIVE     
 COCAINE NEGATIVE AMPHETAMINES NEGATIVE METHADONE NEGATIVE     
 THC (TH-CANNABINOL) NEGATIVE     
 OPIATES NEGATIVE     
 BARBITURATES NEGATIVE     
HCG URINE, QL. - POC Collection Time: 04/25/19 11:52 PM  
Result Value Ref Range Pregnancy test,urine (POC) NEGATIVE  NEG Voice dictation software was used during the making of this note. This software is not perfect and grammatical and other typographical errors may be present. This note has been proofread, but may still contain errors.  
Jacey Heredia MD; 4/26/2019 @1:10 AM  
===================================================================

## 2020-02-19 ENCOUNTER — HOSPITAL ENCOUNTER (EMERGENCY)
Age: 40
Discharge: LEFT BEFORE DISCHARGE | End: 2020-02-20
Attending: EMERGENCY MEDICINE
Payer: MEDICARE

## 2020-02-19 DIAGNOSIS — G24.02 DYSTONIC DRUG REACTION: Primary | ICD-10-CM

## 2020-02-19 DIAGNOSIS — F41.9 ANXIETY DISORDER, UNSPECIFIED TYPE: ICD-10-CM

## 2020-02-19 PROCEDURE — 96372 THER/PROPH/DIAG INJ SC/IM: CPT

## 2020-02-19 PROCEDURE — 99282 EMERGENCY DEPT VISIT SF MDM: CPT

## 2020-02-19 PROCEDURE — 99283 EMERGENCY DEPT VISIT LOW MDM: CPT

## 2020-02-20 VITALS
OXYGEN SATURATION: 99 % | DIASTOLIC BLOOD PRESSURE: 69 MMHG | TEMPERATURE: 98.1 F | HEIGHT: 67 IN | WEIGHT: 157 LBS | RESPIRATION RATE: 16 BRPM | HEART RATE: 114 BPM | BODY MASS INDEX: 24.64 KG/M2 | SYSTOLIC BLOOD PRESSURE: 110 MMHG

## 2020-02-20 PROCEDURE — 96372 THER/PROPH/DIAG INJ SC/IM: CPT

## 2020-02-20 PROCEDURE — 74011250636 HC RX REV CODE- 250/636: Performed by: EMERGENCY MEDICINE

## 2020-02-20 RX ORDER — LORAZEPAM 2 MG/ML
2 INJECTION INTRAMUSCULAR ONCE
Status: COMPLETED | OUTPATIENT
Start: 2020-02-20 | End: 2020-02-20

## 2020-02-20 RX ORDER — DIPHENHYDRAMINE HYDROCHLORIDE 50 MG/ML
25 INJECTION, SOLUTION INTRAMUSCULAR; INTRAVENOUS
Status: COMPLETED | OUTPATIENT
Start: 2020-02-20 | End: 2020-02-20

## 2020-02-20 RX ADMIN — DIPHENHYDRAMINE HYDROCHLORIDE 25 MG: 50 INJECTION, SOLUTION INTRAMUSCULAR; INTRAVENOUS at 00:16

## 2020-02-20 RX ADMIN — LORAZEPAM 2 MG: 2 INJECTION, SOLUTION INTRAMUSCULAR; INTRAVENOUS at 00:16

## 2020-02-20 NOTE — ED PROVIDER NOTES
Patient presents by ambulance from home with complaints of anxiety and's. She states she has recurring problems with EPS symptoms and dystonic reactions to her antipsychotic medications. She is taking Cogentin however and reports taking Benadryl at home. She reports having spasms in her jaw muscles and neck muscles making it difficult to talk. Patient also reports increased stressors and has not slept in at least 2 days. She was recently released from psychiatric facility about a week to 10 days ago. But does report auditory hallucinations. The history is provided by the patient. Anxiety    This is a new problem. The current episode started more than 2 days ago. The problem has been gradually worsening. The problem occurs constantly. The pain is associated with normal activity. The pain is moderate. The quality of the pain is described as tightness. The pain does not radiate. Pertinent negatives include no abdominal pain, no back pain, no claudication, no cough, no diaphoresis, no dizziness, no exertional chest pressure, no fever, no headaches, no hemoptysis, no irregular heartbeat, no leg pain, no lower extremity edema, no malaise/fatigue, no nausea, no near-syncope, no numbness, no orthopnea, no palpitations, no PND, no shortness of breath, no sputum production, no vomiting and no weakness. Treatments tried: Prescription medications and Benadryl. The treatment provided no relief.         Past Medical History:   Diagnosis Date    Anxiety and depression     anxiety, depression    Back pain     problems with L4-5 vertebra    Common bile duct dilatation 6/12/2013    Depression     Gall bladder disease 6/12/2013    Psychiatric disorder        Past Surgical History:   Procedure Laterality Date    HX CHOLECYSTECTOMY      HX ORTHOPAEDIC  2007    left foot, tonail excision         Family History:   Problem Relation Age of Onset    Cancer Paternal Grandmother         skin cancer       Social History Socioeconomic History    Marital status:      Spouse name: Not on file    Number of children: Not on file    Years of education: Not on file    Highest education level: Not on file   Occupational History    Not on file   Social Needs    Financial resource strain: Not on file    Food insecurity:     Worry: Not on file     Inability: Not on file    Transportation needs:     Medical: Not on file     Non-medical: Not on file   Tobacco Use    Smoking status: Never Smoker   Substance and Sexual Activity    Alcohol use: No    Drug use: No    Sexual activity: Never     Birth control/protection: None   Lifestyle    Physical activity:     Days per week: Not on file     Minutes per session: Not on file    Stress: Not on file   Relationships    Social connections:     Talks on phone: Not on file     Gets together: Not on file     Attends Druze service: Not on file     Active member of club or organization: Not on file     Attends meetings of clubs or organizations: Not on file     Relationship status: Not on file    Intimate partner violence:     Fear of current or ex partner: Not on file     Emotionally abused: Not on file     Physically abused: Not on file     Forced sexual activity: Not on file   Other Topics Concern    Not on file   Social History Narrative    Not on file         ALLERGIES: Patient has no known allergies. Review of Systems   Constitutional: Negative for diaphoresis, fever and malaise/fatigue. Respiratory: Negative for cough, hemoptysis, sputum production and shortness of breath. Cardiovascular: Negative for palpitations, orthopnea, claudication, PND and near-syncope. Gastrointestinal: Negative for abdominal pain, nausea and vomiting. Musculoskeletal: Negative for back pain. Neurological: Negative for dizziness, weakness, numbness and headaches. All other systems reviewed and are negative.       Vitals:    02/19/20 2352   BP: 110/69   Pulse: (!) 114   Resp: 16 Temp: 98.1 °F (36.7 °C)   SpO2: 99%   Weight: 71.2 kg (157 lb)   Height: 5' 7\" (1.702 m)            Physical Exam  Vitals signs and nursing note reviewed. Constitutional:       Appearance: Normal appearance. She is normal weight. HENT:      Head: Normocephalic. Right Ear: Tympanic membrane normal.      Left Ear: Tympanic membrane normal.      Nose: Nose normal.      Mouth/Throat:      Mouth: Mucous membranes are dry. Pharynx: Oropharynx is clear. Eyes:      Extraocular Movements: Extraocular movements intact. Conjunctiva/sclera: Conjunctivae normal.      Pupils: Pupils are equal, round, and reactive to light. Neck:      Musculoskeletal: Normal range of motion and neck supple. No neck rigidity or muscular tenderness. Cardiovascular:      Rate and Rhythm: Normal rate and regular rhythm. Pulses: Normal pulses. Heart sounds: Normal heart sounds. Pulmonary:      Effort: Pulmonary effort is normal.      Breath sounds: Normal breath sounds. Abdominal:      General: Abdomen is flat. Bowel sounds are normal.      Palpations: Abdomen is soft. Lymphadenopathy:      Cervical: No cervical adenopathy. Skin:     General: Skin is warm and dry. Capillary Refill: Capillary refill takes less than 2 seconds. Neurological:      General: No focal deficit present. Mental Status: She is alert and oriented to person, place, and time. Mental status is at baseline. Psychiatric:         Attention and Perception: Attention normal.         Mood and Affect: Mood is anxious. Speech: Speech is rapid and pressured. Behavior: Behavior is hyperactive. Thought Content: Thought content is paranoid.          Cognition and Memory: Cognition normal.          MDM  Number of Diagnoses or Management Options  Risk of Complications, Morbidity, and/or Mortality  Presenting problems: moderate  Diagnostic procedures: minimal  Management options: moderate  General comments: Patient does have some evidence of possible dystonic reaction. Will be given an injection of Benadryl also will be given 2 mg Ativan. Patient subsequently left the emergency department without notification after she had been medicated.     Patient Progress  Patient progress: stable         Procedures

## 2020-02-20 NOTE — ED TRIAGE NOTES
Patient arrives to ED from home complaining of \"severe anxiety\". Patient states she is on Seroquel but is having a neurological reaction to it and \"to her muscles and stuff\". Patient states she is having stuttering and \"everything goes all out of wack and I almost go paralyzed\". Patient was just discharged from [facility] a week ago.

## 2020-07-26 ENCOUNTER — HOSPITAL ENCOUNTER (EMERGENCY)
Age: 40
Discharge: HOME | End: 2020-07-26
Attending: EMERGENCY MEDICINE
Payer: MEDICARE

## 2020-07-26 ENCOUNTER — APPOINTMENT (OUTPATIENT)
Dept: CT IMAGING | Age: 40
End: 2020-07-26
Attending: EMERGENCY MEDICINE
Payer: MEDICARE

## 2020-07-26 VITALS
DIASTOLIC BLOOD PRESSURE: 85 MMHG | RESPIRATION RATE: 16 BRPM | BODY MASS INDEX: 20.88 KG/M2 | WEIGHT: 133 LBS | HEIGHT: 67 IN | OXYGEN SATURATION: 99 % | TEMPERATURE: 98.1 F | SYSTOLIC BLOOD PRESSURE: 123 MMHG | HEART RATE: 107 BPM

## 2020-07-26 DIAGNOSIS — S16.1XXA STRAIN OF NECK MUSCLE, INITIAL ENCOUNTER: Primary | ICD-10-CM

## 2020-07-26 DIAGNOSIS — G44.319 ACUTE POST-TRAUMATIC HEADACHE, NOT INTRACTABLE: ICD-10-CM

## 2020-07-26 PROCEDURE — 70450 CT HEAD/BRAIN W/O DYE: CPT

## 2020-07-26 PROCEDURE — 99283 EMERGENCY DEPT VISIT LOW MDM: CPT

## 2020-07-26 PROCEDURE — 72125 CT NECK SPINE W/O DYE: CPT

## 2020-07-26 RX ORDER — METHOCARBAMOL 750 MG/1
750 TABLET, FILM COATED ORAL 4 TIMES DAILY
Qty: 30 TAB | Refills: 0 | Status: SHIPPED | OUTPATIENT
Start: 2020-07-26 | End: 2020-08-03

## 2020-07-26 NOTE — ED NOTES
I have reviewed discharge instructions with the patient. The patient verbalized understanding. Patient left ED via Discharge Method: ambulatory to Home with SELF. Opportunity for questions and clarification provided. Patient given 1 scripts. To continue your aftercare when you leave the hospital, you may receive an automated call from our care team to check in on how you are doing. This is a free service and part of our promise to provide the best care and service to meet your aftercare needs.  If you have questions, or wish to unsubscribe from this service please call [phone]. Thank you for Choosing our [facility] Emergency Department.

## 2020-07-26 NOTE — DISCHARGE INSTRUCTIONS
Follow up with one of the doctors listed. Return to the ER if your symptoms worsen.     [address] [facility] [address]  [phone]    [facility]  [phone]
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
## 2021-06-27 ENCOUNTER — HOSPITAL ENCOUNTER (EMERGENCY)
Age: 41
Discharge: HOME | End: 2021-06-27
Attending: EMERGENCY MEDICINE
Payer: MEDICARE

## 2021-06-27 VITALS
RESPIRATION RATE: 16 BRPM | TEMPERATURE: 98 F | WEIGHT: 121 LBS | DIASTOLIC BLOOD PRESSURE: 81 MMHG | OXYGEN SATURATION: 98 % | SYSTOLIC BLOOD PRESSURE: 131 MMHG | HEART RATE: 88 BPM | BODY MASS INDEX: 18.99 KG/M2 | HEIGHT: 67 IN

## 2021-06-27 DIAGNOSIS — B86 SCABIES: Primary | ICD-10-CM

## 2021-06-27 PROCEDURE — 99283 EMERGENCY DEPT VISIT LOW MDM: CPT

## 2021-06-27 RX ORDER — PERMETHRIN 50 MG/G
CREAM TOPICAL
Qty: 60 G | Refills: 2 | Status: SHIPPED | OUTPATIENT
Start: 2021-06-27 | End: 2021-06-27 | Stop reason: SDUPTHER

## 2021-06-27 RX ORDER — PERMETHRIN 50 MG/G
CREAM TOPICAL
Qty: 60 G | Refills: 2 | Status: SHIPPED | OUTPATIENT
Start: 2021-06-27 | End: 2021-07-27

## 2021-06-27 NOTE — ED TRIAGE NOTES
Pt states she has sores popping up all over for months and is worse on legs than anywhere else. No drainage.

## 2021-06-27 NOTE — ED NOTES
I have reviewed discharge instructions with the patient. The patient verbalized understanding. Patient left ED via Discharge Method: ambulatory to Home with self. Opportunity for questions and clarification provided. Patient given 1 scripts. No e-sign. To continue your aftercare when you leave the hospital, you may receive an automated call from our care team to check in on how you are doing. This is a free service and part of our promise to provide the best care and service to meet your aftercare needs.  If you have questions, or wish to unsubscribe from this service please call [phone]. Thank you for Choosing our [facility] Emergency Department.

## 2021-06-28 NOTE — ED PROVIDER NOTES
Patient presents emerged department with skin rash has been there for a month. She is on her hands and on her legs and thighs. The history is provided by the patient. Skin Problem   This is a new problem. The problem has not changed since onset. There has been no fever. Past Medical History:   Diagnosis Date    Anxiety and depression     anxiety, depression    Back pain     problems with L4-5 vertebra    Common bile duct dilatation 6/12/2013    Depression     Gall bladder disease 6/12/2013    Psychiatric disorder        Past Surgical History:   Procedure Laterality Date    HX CHOLECYSTECTOMY      HX ORTHOPAEDIC  2007    left foot, tonail excision         Family History:   Problem Relation Age of Onset    Cancer Paternal Grandmother         skin cancer       Social History     Socioeconomic History    Marital status:      Spouse name: Not on file    Number of children: Not on file    Years of education: Not on file    Highest education level: Not on file   Occupational History    Not on file   Tobacco Use    Smoking status: Never Smoker   Substance and Sexual Activity    Alcohol use: No    Drug use: No    Sexual activity: Never     Birth control/protection: None   Other Topics Concern    Not on file   Social History Narrative    Not on file     Social Determinants of Health     Financial Resource Strain:     Difficulty of Paying Living Expenses:    Food Insecurity:     Worried About Running Out of Food in the Last Year:     Ran Out of Food in the Last Year:    Transportation Needs:     Lack of Transportation (Medical):      Lack of Transportation (Non-Medical):    Physical Activity:     Days of Exercise per Week:     Minutes of Exercise per Session:    Stress:     Feeling of Stress :    Social Connections:     Frequency of Communication with Friends and Family:     Frequency of Social Gatherings with Friends and Family:     Attends Scientologist Services:     Active Member of Clubs or Organizations:     Attends Club or Organization Meetings:     Marital Status:    Intimate Partner Violence:     Fear of Current or Ex-Partner:     Emotionally Abused:     Physically Abused:     Sexually Abused: ALLERGIES: Patient has no known allergies. Review of Systems   Constitutional: Negative. Negative for activity change, appetite change, chills and fever. HENT: Negative. Respiratory: Negative. Negative for cough and shortness of breath. Cardiovascular: Negative. Gastrointestinal: Negative. Genitourinary: Negative. Musculoskeletal: Negative. Negative for gait problem. Neurological: Negative. All other systems reviewed and are negative. Vitals:    06/27/21 1602 06/27/21 1603 06/27/21 1637   BP:  133/84 131/81   Pulse: 92  88   Resp: 16  16   Temp: 98 °F (36.7 °C)  98 °F (36.7 °C)   SpO2: 95%  98%   Weight: 54.9 kg (121 lb)     Height: 5' 7\" (1.702 m)              Physical Exam  Vitals and nursing note reviewed. Constitutional:       General: She is not in acute distress. Appearance: Normal appearance. She is not ill-appearing, toxic-appearing or diaphoretic. HENT:      Head: Normocephalic and atraumatic. Eyes:      Conjunctiva/sclera: Conjunctivae normal.   Cardiovascular:      Rate and Rhythm: Normal rate and regular rhythm. Pulses: Normal pulses. Heart sounds: Normal heart sounds. Pulmonary:      Effort: Pulmonary effort is normal. No respiratory distress. Breath sounds: Normal breath sounds and air entry. No stridor, decreased air movement or transmitted upper airway sounds. No decreased breath sounds, wheezing, rhonchi or rales. Chest:      Chest wall: No tenderness. Skin:     General: Skin is warm and dry. Comments: Excoriations on hands and thighs. Possibly consistent with scabies infection. Neurological:      General: No focal deficit present.       Mental Status: She is alert and oriented to person, place, and time. Mental status is at baseline. MDM  Number of Diagnoses or Management Options  Scabies: new and requires workup  Diagnosis management comments: Presents emergency department with rash to hands and thighs. Will place her on permethrin and give her some refills.     Risk of Complications, Morbidity, and/or Mortality  Presenting problems: low  Diagnostic procedures: low  Management options: low    Patient Progress  Patient progress: stable         Procedures

## 2021-08-01 ENCOUNTER — HOSPITAL ENCOUNTER (EMERGENCY)
Age: 41
Discharge: LEFT BEFORE DISCHARGE | End: 2021-08-01
Attending: EMERGENCY MEDICINE
Payer: MEDICARE

## 2021-08-01 VITALS
DIASTOLIC BLOOD PRESSURE: 64 MMHG | RESPIRATION RATE: 16 BRPM | BODY MASS INDEX: 18.83 KG/M2 | TEMPERATURE: 98.3 F | OXYGEN SATURATION: 97 % | HEART RATE: 89 BPM | HEIGHT: 67 IN | SYSTOLIC BLOOD PRESSURE: 98 MMHG | WEIGHT: 120 LBS

## 2021-08-01 DIAGNOSIS — Z53.21 PATIENT LEFT WITHOUT BEING SEEN: Primary | ICD-10-CM

## 2021-08-01 PROCEDURE — 75810000275 HC EMERGENCY DEPT VISIT NO LEVEL OF CARE

## 2021-08-01 NOTE — ED TRIAGE NOTES
Pt ambulatory to triage. Pt states she has vaginal d/c, pink x few weeks - LMP not known. Reports odor. Denies lesions or sores. Pt states this is the result of a situation she would rather not talk about. Denies concern for hepatitis, HIV, syphillis, etc and declines to file a police report.

## 2021-08-02 NOTE — ED PROVIDER NOTES
Vaginal Discharge          Past Medical History:   Diagnosis Date    Anxiety and depression     anxiety, depression    Back pain     problems with L4-5 vertebra    Common bile duct dilatation 6/12/2013    Depression     Gall bladder disease 6/12/2013    Psychiatric disorder        Past Surgical History:   Procedure Laterality Date    HX CHOLECYSTECTOMY      HX ORTHOPAEDIC  2007    left foot, tonail excision         Family History:   Problem Relation Age of Onset    Cancer Paternal Grandmother         skin cancer       Social History     Socioeconomic History    Marital status:      Spouse name: Not on file    Number of children: Not on file    Years of education: Not on file    Highest education level: Not on file   Occupational History    Not on file   Tobacco Use    Smoking status: Never Smoker   Substance and Sexual Activity    Alcohol use: No    Drug use: No    Sexual activity: Never     Birth control/protection: None   Other Topics Concern    Not on file   Social History Narrative    Not on file     Social Determinants of Health     Financial Resource Strain:     Difficulty of Paying Living Expenses:    Food Insecurity:     Worried About Running Out of Food in the Last Year:     Ran Out of Food in the Last Year:    Transportation Needs:     Lack of Transportation (Medical):  Lack of Transportation (Non-Medical):    Physical Activity:     Days of Exercise per Week:     Minutes of Exercise per Session:    Stress:     Feeling of Stress :    Social Connections:     Frequency of Communication with Friends and Family:     Frequency of Social Gatherings with Friends and Family:     Attends Jehovah's witness Services:     Active Member of Clubs or Organizations:     Attends Club or Organization Meetings:     Marital Status:    Intimate Partner Violence:     Fear of Current or Ex-Partner:     Emotionally Abused:     Physically Abused:     Sexually Abused:           ALLERGIES: Patient has no known allergies. Review of Systems   Genitourinary: Positive for vaginal discharge.        Vitals:    08/01/21 1756   BP: 98/64   Pulse: 89   Resp: 16   Temp: 98.3 °F (36.8 °C)   SpO2: 97%   Weight: 54.4 kg (120 lb)   Height: 5' 7\" (1.702 m)            Physical Exam     MDM       Procedures

## 2021-08-18 ENCOUNTER — HOSPITAL ENCOUNTER (EMERGENCY)
Age: 41
Discharge: HOME | End: 2021-08-18
Attending: EMERGENCY MEDICINE
Payer: MEDICARE

## 2021-08-18 VITALS
HEIGHT: 67 IN | SYSTOLIC BLOOD PRESSURE: 106 MMHG | OXYGEN SATURATION: 98 % | WEIGHT: 120 LBS | TEMPERATURE: 98.1 F | DIASTOLIC BLOOD PRESSURE: 71 MMHG | BODY MASS INDEX: 18.83 KG/M2 | RESPIRATION RATE: 16 BRPM | HEART RATE: 74 BPM

## 2021-08-18 DIAGNOSIS — Z20.2 STD EXPOSURE: ICD-10-CM

## 2021-08-18 DIAGNOSIS — N76.0 BV (BACTERIAL VAGINOSIS): Primary | ICD-10-CM

## 2021-08-18 DIAGNOSIS — B96.89 BV (BACTERIAL VAGINOSIS): Primary | ICD-10-CM

## 2021-08-18 LAB
BACTERIA URNS QL MICRO: ABNORMAL /HPF
CASTS URNS QL MICRO: 0 /LPF
CRYSTALS URNS QL MICRO: 0 /LPF
EPI CELLS #/AREA URNS HPF: ABNORMAL /HPF
MUCOUS THREADS URNS QL MICRO: 0 /LPF
OTHER OBSERVATIONS,UCOM: ABNORMAL
RBC #/AREA URNS HPF: 0 /HPF
SERVICE CMNT-IMP: NORMAL
WBC URNS QL MICRO: >100 /HPF
WET PREP GENITAL: NORMAL

## 2021-08-18 PROCEDURE — 99283 EMERGENCY DEPT VISIT LOW MDM: CPT

## 2021-08-18 PROCEDURE — 81015 MICROSCOPIC EXAM OF URINE: CPT

## 2021-08-18 PROCEDURE — 74011250636 HC RX REV CODE- 250/636: Performed by: EMERGENCY MEDICINE

## 2021-08-18 PROCEDURE — 87210 SMEAR WET MOUNT SALINE/INK: CPT

## 2021-08-18 PROCEDURE — 74011000250 HC RX REV CODE- 250: Performed by: EMERGENCY MEDICINE

## 2021-08-18 PROCEDURE — 96372 THER/PROPH/DIAG INJ SC/IM: CPT

## 2021-08-18 PROCEDURE — 87491 CHLMYD TRACH DNA AMP PROBE: CPT

## 2021-08-18 RX ORDER — DOXYCYCLINE 100 MG/1
100 TABLET ORAL 2 TIMES DAILY
Qty: 14 TABLET | Refills: 0 | Status: SHIPPED | OUTPATIENT
Start: 2021-08-18 | End: 2022-03-15 | Stop reason: ALTCHOICE

## 2021-08-18 RX ORDER — ONDANSETRON 4 MG/1
4 TABLET, ORALLY DISINTEGRATING ORAL
Qty: 10 TABLET | Refills: 0 | OUTPATIENT
Start: 2021-08-18 | End: 2022-03-15

## 2021-08-18 RX ORDER — METRONIDAZOLE 500 MG/1
500 TABLET ORAL 2 TIMES DAILY
Qty: 14 TABLET | Refills: 0 | Status: SHIPPED | OUTPATIENT
Start: 2021-08-18 | End: 2021-08-25

## 2021-08-18 RX ORDER — METRONIDAZOLE 500 MG/1
500 TABLET ORAL 2 TIMES DAILY
Qty: 14 TABLET | Refills: 0 | Status: SHIPPED | OUTPATIENT
Start: 2021-08-18 | End: 2021-08-18 | Stop reason: SDUPTHER

## 2021-08-18 RX ADMIN — LIDOCAINE HYDROCHLORIDE 500 MG: 10 INJECTION, SOLUTION INFILTRATION; PERINEURAL at 05:52

## 2021-08-18 NOTE — ED PROVIDER NOTES
The history is provided by the patient. Vaginal Discharge   This is a new problem. The current episode started more than 1 week ago. The problem occurs daily. The problem has been gradually worsening. The discharge occurs spontaneously. The discharge was white, malodorous and thick. She is not pregnant. She has not missed her period. Associated symptoms include abdominal pain and dysuria. Pertinent negatives include no anorexia, no diaphoresis, no fever, no abdominal swelling, no constipation, no diarrhea, no nausea, no vomiting, no dyspareunia, no frequency, no genital burning, no genital itching, no genital lesions, no perineal pain, no perineal odor and no painful intercourse. Risk factors include history of STDs. She has tried nothing for the symptoms. The treatment provided no relief. Her past medical history is significant for STD. Her past medical history does not include irregular periods, PID, ectopic pregnancy, ovarian cysts or infertility.         Past Medical History:   Diagnosis Date    Anxiety and depression     anxiety, depression    Back pain     problems with L4-5 vertebra    Common bile duct dilatation 6/12/2013    Depression     Gall bladder disease 6/12/2013    Psychiatric disorder        Past Surgical History:   Procedure Laterality Date    HX CHOLECYSTECTOMY      HX ORTHOPAEDIC  2007    left foot, tonail excision         Family History:   Problem Relation Age of Onset    Cancer Paternal Grandmother         skin cancer       Social History     Socioeconomic History    Marital status:      Spouse name: Not on file    Number of children: Not on file    Years of education: Not on file    Highest education level: Not on file   Occupational History    Not on file   Tobacco Use    Smoking status: Never Smoker   Substance and Sexual Activity    Alcohol use: No    Drug use: No    Sexual activity: Never     Birth control/protection: None   Other Topics Concern    Not on file Social History Narrative    Not on file     Social Determinants of Health     Financial Resource Strain:     Difficulty of Paying Living Expenses:    Food Insecurity:     Worried About Running Out of Food in the Last Year:     [address] in the Last Year:    Transportation Needs:     Lack of Transportation (Medical):  Lack of Transportation (Non-Medical):    Physical Activity:     Days of Exercise per Week:     Minutes of Exercise per Session:    Stress:     Feeling of Stress :    Social Connections:     Frequency of Communication with Friends and Family:     Frequency of Social Gatherings with Friends and Family:     Attends Gnosticist Services:     Active Member of Clubs or Organizations:     Attends Club or Organization Meetings:     Marital Status:    Intimate Partner Violence:     Fear of Current or Ex-Partner:     Emotionally Abused:     Physically Abused:     Sexually Abused: ALLERGIES: Patient has no known allergies. Review of Systems   Constitutional: Negative for diaphoresis and fever. Gastrointestinal: Positive for abdominal pain. Negative for anorexia, constipation, diarrhea, nausea and vomiting. Genitourinary: Positive for dysuria and vaginal discharge. Negative for dyspareunia and frequency. All other systems reviewed and are negative. Vitals:    08/18/21 0421   BP: 106/71   Pulse: 74   Resp: 16   Temp: 98.1 °F (36.7 °C)   SpO2: 98%   Weight: 54.4 kg (120 lb)   Height: 5' 7\" (1.702 m)            Physical Exam  Vitals and nursing note reviewed. Constitutional:       General: She is not in acute distress. Appearance: She is well-developed. HENT:      Head: Normocephalic and atraumatic. Right Ear: External ear normal.      Left Ear: External ear normal.   Eyes:      Extraocular Movements: Extraocular movements intact. Conjunctiva/sclera: Conjunctivae normal.      Pupils: Pupils are equal, round, and reactive to light.    Cardiovascular: Rate and Rhythm: Normal rate and regular rhythm. Heart sounds: Normal heart sounds. No murmur heard. Pulmonary:      Effort: Pulmonary effort is normal.      Breath sounds: Normal breath sounds. Abdominal:      General: Abdomen is flat. Bowel sounds are normal.      Palpations: Abdomen is soft. There is no shifting dullness, hepatomegaly, mass or pulsatile mass. Tenderness: There is abdominal tenderness (mild) in the suprapubic area. There is no right CVA tenderness, left CVA tenderness, guarding or rebound. Negative signs include Finn's sign and McBurney's sign. Hernia: No hernia is present. Musculoskeletal:         General: Normal range of motion. Cervical back: Normal range of motion and neck supple. Right lower leg: No edema. Left lower leg: No edema. Skin:     General: Skin is warm and dry. Capillary Refill: Capillary refill takes less than 2 seconds. Neurological:      Mental Status: She is alert and oriented to person, place, and time. Psychiatric:         Mood and Affect: Mood normal.         Behavior: Behavior normal.          MDM  Number of Diagnoses or Management Options  BV (bacterial vaginosis): new and requires workup  STD exposure: new and requires workup     Amount and/or Complexity of Data Reviewed  Clinical lab tests: ordered and reviewed  Review and summarize past medical records: yes    Risk of Complications, Morbidity, and/or Mortality  Presenting problems: moderate  Diagnostic procedures: minimal  Management options: moderate    Patient Progress  Patient progress: improved         Procedures    The patient was observed in the ED. due to concern over possible STD exposure, the patient was treated with Rocephin here in the emergency department.   As the patient's urine was positive for a great number of white cells, this could be either due to her BV or to UTI/urethritis, so the patient was discharged on both doxycycline as well as Flagyl. Results Reviewed:      Recent Results (from the past 24 hour(s))   WET PREP    Collection Time: 08/18/21  5:03 AM    Specimen: Vaginal Specimen   Result Value Ref Range    Special Requests: NO SPECIAL REQUESTS      Wet prep NO YEAST SEEN      Wet prep NO TRICHOMONAS SEEN      Wet prep MODERATE  CLUE CELLS PRESENT        Wet prep 20 TO 50 WBC/HPF SEEN    URINE MICROSCOPIC    Collection Time: 08/18/21  5:03 AM   Result Value Ref Range    WBC >100 0 /hpf    RBC 0 0 /hpf    Epithelial cells 5-10 0 /hpf    Bacteria 2+ (H) 0 /hpf    Casts 0 0 /lpf    Crystals, urine 0 0 /LPF    Mucus 0 0 /lpf    Other observations RESULTS VERIFIED MANUALLY         I discussed the results of all labs, procedures, radiographs, and treatments with the patient and available family. Treatment plan is agreed upon and the patient is ready for discharge. All voiced understanding of the discharge plan and medication instructions or changes as appropriate. Questions about treatment in the ED were answered. All were encouraged to return should symptoms worsen or new problems develop.

## 2021-08-18 NOTE — ED TRIAGE NOTES
Arrives with face mask in place. Reports lower abdominal/pelvic pain. Associated urinary pain, vaginal discharge. Onset couple weeks pta. Denies n/v/d, fever/chills.

## 2021-08-18 NOTE — ED NOTES
I have reviewed discharge instructions with the patient. The patient verbalized understanding. Patient left ED via Discharge Method: ambulatory to Home. Opportunity for questions and clarification provided. Patient given 3 scripts. To continue your aftercare when you leave the hospital, you may receive an automated call from our care team to check in on how you are doing. This is a free service and part of our promise to provide the best care and service to meet your aftercare needs.  If you have questions, or wish to unsubscribe from this service please call [phone]. Thank you for Choosing our [facility] Emergency Department.

## 2021-08-19 LAB
C TRACH RRNA SPEC QL NAA+PROBE: NEGATIVE
N GONORRHOEA RRNA SPEC QL NAA+PROBE: NEGATIVE
SPECIMEN SOURCE: NORMAL

## 2021-10-24 ENCOUNTER — HOSPITAL ENCOUNTER (EMERGENCY)
Age: 41
Discharge: HOME | End: 2021-10-24
Attending: EMERGENCY MEDICINE
Payer: MEDICARE

## 2021-10-24 VITALS
DIASTOLIC BLOOD PRESSURE: 78 MMHG | SYSTOLIC BLOOD PRESSURE: 122 MMHG | HEART RATE: 85 BPM | RESPIRATION RATE: 16 BRPM | TEMPERATURE: 98 F | WEIGHT: 130 LBS | BODY MASS INDEX: 20.4 KG/M2 | OXYGEN SATURATION: 99 % | HEIGHT: 67 IN

## 2021-10-24 DIAGNOSIS — Z20.2 STD EXPOSURE: Primary | ICD-10-CM

## 2021-10-24 DIAGNOSIS — N89.8 VAGINAL DISCHARGE: ICD-10-CM

## 2021-10-24 LAB
BACTERIA URNS QL MICRO: 0 /HPF
CASTS URNS QL MICRO: 0 /LPF
CRYSTALS URNS QL MICRO: 0 /LPF
EPI CELLS #/AREA URNS HPF: NORMAL /HPF
MUCOUS THREADS URNS QL MICRO: 0 /LPF
OTHER OBSERVATIONS,UCOM: NORMAL
RBC #/AREA URNS HPF: 0 /HPF
SERVICE CMNT-IMP: NORMAL
WBC URNS QL MICRO: NORMAL /HPF
WET PREP GENITAL: NORMAL
WET PREP GENITAL: NORMAL

## 2021-10-24 PROCEDURE — 74011250637 HC RX REV CODE- 250/637: Performed by: NURSE PRACTITIONER

## 2021-10-24 PROCEDURE — 96372 THER/PROPH/DIAG INJ SC/IM: CPT

## 2021-10-24 PROCEDURE — 87491 CHLMYD TRACH DNA AMP PROBE: CPT

## 2021-10-24 PROCEDURE — 99284 EMERGENCY DEPT VISIT MOD MDM: CPT

## 2021-10-24 PROCEDURE — 81015 MICROSCOPIC EXAM OF URINE: CPT

## 2021-10-24 PROCEDURE — 87210 SMEAR WET MOUNT SALINE/INK: CPT

## 2021-10-24 PROCEDURE — 74011000250 HC RX REV CODE- 250: Performed by: NURSE PRACTITIONER

## 2021-10-24 PROCEDURE — 74011250636 HC RX REV CODE- 250/636: Performed by: NURSE PRACTITIONER

## 2021-10-24 RX ORDER — AZITHROMYCIN 250 MG/1
1000 TABLET, FILM COATED ORAL
Status: COMPLETED | OUTPATIENT
Start: 2021-10-24 | End: 2021-10-24

## 2021-10-24 RX ADMIN — AZITHROMYCIN MONOHYDRATE 1000 MG: 250 TABLET ORAL at 16:21

## 2021-10-24 RX ADMIN — CEFTRIAXONE 500 MG: 500 INJECTION, POWDER, FOR SOLUTION INTRAMUSCULAR; INTRAVENOUS at 16:21

## 2021-10-24 NOTE — ED TRIAGE NOTES
Pt reports lower abdominal pain, states history of an STD. States vaginal discharge and urinary discomfort.

## 2021-10-24 NOTE — ED NOTES
[facility]

## 2021-10-24 NOTE — DISCHARGE INSTRUCTIONS
Your swab was negative for trichomonas, yeast, or bacterial vaginosis. You have been treated today for gonorrhea and chlamydia. As we discussed, that result takes about 3 to 4 days. We will call you if it is positive. If it is positive, you need to notify any sexual partners. They will need to receive treatment. Abstain from sexual activity for at least 7 days after treatment. Follow-up with the health department for further testing of other sexually transmitted diseases that we do not check for in the emergency department such as hepatitis and HIV. Return to the emergency department for any new, worsening, or concerning symptoms.

## 2021-10-24 NOTE — ED PROVIDER NOTES
53-year-old female presents to emergency department today with complaint of pelvic pain and concern for STD. Patient reports that she has had some white and yellow vaginal discharge for several days. She reports feeling some pressure in her bladder when she urinates. She denies any fever, chills, nausea, vomiting, or diarrhea. She states that she was treated a couple of months ago for STDs but is unsure of what STD she had. The history is provided by the patient. Abdominal Pain   This is a new problem. The current episode started more than 2 days ago. The pain is located in the suprapubic region. The pain is mild. Associated symptoms include dysuria. Pertinent negatives include no fever, no diarrhea, no nausea, no vomiting, no constipation, no frequency and no back pain. Nothing worsens the pain. The pain is relieved by nothing.         Past Medical History:   Diagnosis Date    Anxiety and depression     anxiety, depression    Back pain     problems with L4-5 vertebra    Common bile duct dilatation 6/12/2013    Depression     Gall bladder disease 6/12/2013    Psychiatric disorder        Past Surgical History:   Procedure Laterality Date    HX CHOLECYSTECTOMY      HX ORTHOPAEDIC  2007    left foot, tonail excision         Family History:   Problem Relation Age of Onset    Cancer Paternal Grandmother         skin cancer       Social History     Socioeconomic History    Marital status:      Spouse name: Not on file    Number of children: Not on file    Years of education: Not on file    Highest education level: Not on file   Occupational History    Not on file   Tobacco Use    Smoking status: Never Smoker   Substance and Sexual Activity    Alcohol use: No    Drug use: No    Sexual activity: Never     Birth control/protection: None   Other Topics Concern    Not on file   Social History Narrative    Not on file     Social Determinants of Health     Financial Resource Strain:    [address] [place] [address] Difficulty of Paying Living Expenses:    Food Insecurity:     Worried About Running Out of Food in the Last Year:     [address] in the Last Year:    Transportation Needs:     Lack of Transportation (Medical):  Lack of Transportation (Non-Medical):    Physical Activity:     Days of Exercise per Week:     Minutes of Exercise per Session:    Stress:     Feeling of Stress :    Social Connections:     Frequency of Communication with Friends and Family:     Frequency of Social Gatherings with Friends and Family:     Attends Zoroastrian Services:     Active Member of Clubs or Organizations:     Attends Club or Organization Meetings:     Marital Status:    Intimate Partner Violence:     Fear of Current or Ex-Partner:     Emotionally Abused:     Physically Abused:     Sexually Abused: ALLERGIES: Patient has no known allergies. Review of Systems   Constitutional: Negative for chills, fatigue and fever. Gastrointestinal: Positive for abdominal pain. Negative for constipation, diarrhea, nausea and vomiting. Genitourinary: Positive for dysuria. Negative for frequency. Musculoskeletal: Negative for back pain. All other systems reviewed and are negative. Vitals:    10/24/21 1530   BP: 126/82   Pulse: 93   Resp: 18   Temp: 98 °F (36.7 °C)   SpO2: 98%   Weight: 59 kg (130 lb)   Height: 5' 7\" (1.702 m)            Physical Exam  Vitals and nursing note reviewed. Constitutional:       General: She is not in acute distress. Appearance: Normal appearance. She is normal weight. She is not ill-appearing, toxic-appearing or diaphoretic. HENT:      Head: Normocephalic and atraumatic. Right Ear: External ear normal.      Left Ear: External ear normal.      Mouth/Throat:      Mouth: Mucous membranes are moist.      Pharynx: Oropharynx is clear. Eyes:      General: No scleral icterus. Extraocular Movements: Extraocular movements intact.       Conjunctiva/sclera: Conjunctivae normal.   Cardiovascular:      Rate and Rhythm: Normal rate. Pulses: Normal pulses. Heart sounds: Normal heart sounds. Pulmonary:      Effort: Pulmonary effort is normal. No respiratory distress. Breath sounds: Normal breath sounds. Abdominal:      General: Abdomen is flat. Bowel sounds are normal. There is no distension. Palpations: Abdomen is soft. Tenderness: There is no abdominal tenderness. Musculoskeletal:         General: Normal range of motion. Cervical back: Normal range of motion and neck supple. No rigidity. Right lower leg: No edema. Left lower leg: No edema. Skin:     General: Skin is warm and dry. Capillary Refill: Capillary refill takes less than 2 seconds. Neurological:      General: No focal deficit present. Mental Status: She is alert and oriented to person, place, and time. Psychiatric:         Mood and Affect: Mood normal.         Behavior: Behavior normal.         Thought Content: Thought content normal.         Judgment: Judgment normal.          MDM  Number of Diagnoses or Management Options  STD exposure: new and requires workup  Vaginal discharge: new and requires workup  Diagnosis management comments: Overall well-appearing 29-year-old female presents emergency department today requesting to be checked for STDs. Patient reports white and yellow vaginal discharge. I have offered pelvic exam in the emergency department today, however patient declines pelvic exam would prefer to self swab. Abdomen is soft and nontender. No peritoneal signs. No tenderness with palpation over the pelvic area. She is afebrile today. Urine is negative for pregnancy or indication of UTI. Patient states that in the past she was treated with doxycycline but this made her very nauseated and gave her diarrhea. Patient treated with oral azithromycin and IM rocephin in the ER for gonorrhea and chlamydia coverage.   Wet prep negative for yeast, trichomonas, and BV. I have discussed the results of all labs, procedures, radiographs, and/or treatments with the patient and available family members. Chuy Hack is agreed upon by the patient and the patient is ready for discharge.  Questions about treatment in the ED and differential diagnosis of presenting condition were answered. Sascha Szymanski was given verbal discharge instructions including, but not limited to, importance of returning to the emergency department for any concern of worsening or continued symptoms.  Instructions were given to follow up with a primary care provider or specialist within 1-2 days. Griffin Gonzalez effects of medications, if prescribed, were discussed and patient was advised to refrain from significant physical activity until followed up by primary care physician and to not drive or operate heavy machinery after taking any sedating substances.      Ines Dumont NP; 10/24/2021 @4:43 PM Voice dictation software was used during the making of  this note. This software is not perfect and grammatical and other typographical errors  may be present. This note has not been proofread for errors.          Amount and/or Complexity of Data Reviewed  Clinical lab tests: reviewed  Review and summarize past medical records: yes    Risk of Complications, Morbidity, and/or Mortality  Presenting problems: low  Diagnostic procedures: low  Management options: low    Patient Progress  Patient progress: improved        Recent Results (from the past 8 hour(s))   URINE MICROSCOPIC    Collection Time: 10/24/21  3:52 PM   Result Value Ref Range    WBC 3-5 0 /hpf    RBC 0 0 /hpf    Epithelial cells 5-10 0 /hpf    Bacteria 0 0 /hpf    Casts 0 0 /lpf    Crystals, urine 0 0 /LPF    Mucus 0 0 /lpf    Other observations RESULTS VERIFIED MANUALLY     WET PREP    Collection Time: 10/24/21  4:21 PM    Specimen: Vaginal Specimen   Result Value Ref Range    Special Requests: NO SPECIAL REQUESTS      Wet prep NO YEAST,TRICHOMONAS OR CLUE CELLS NOTED      Wet prep 20 TO 30 WBCS SEEN PER HIGH POWER FIELD          Procedures

## 2021-10-24 NOTE — ED NOTES

## 2021-12-04 ENCOUNTER — HOSPITAL ENCOUNTER (EMERGENCY)
Age: 41
Discharge: LEFT BEFORE DISCHARGE | End: 2021-12-04
Payer: MEDICARE

## 2021-12-04 VITALS
HEIGHT: 67 IN | OXYGEN SATURATION: 100 % | SYSTOLIC BLOOD PRESSURE: 119 MMHG | RESPIRATION RATE: 16 BRPM | DIASTOLIC BLOOD PRESSURE: 83 MMHG | WEIGHT: 118 LBS | BODY MASS INDEX: 18.52 KG/M2 | HEART RATE: 100 BPM | TEMPERATURE: 98.3 F

## 2021-12-04 LAB
ALBUMIN SERPL-MCNC: 3.4 G/DL (ref 3.5–5)
ALBUMIN/GLOB SERPL: 1 {RATIO} (ref 1.2–3.5)
ALP SERPL-CCNC: 77 U/L (ref 50–136)
ALT SERPL-CCNC: 51 U/L (ref 12–65)
ANION GAP SERPL CALC-SCNC: 3 MMOL/L (ref 7–16)
AST SERPL-CCNC: 67 U/L (ref 15–37)
BASOPHILS # BLD: 0 K/UL (ref 0–0.2)
BASOPHILS NFR BLD: 1 % (ref 0–2)
BILIRUB SERPL-MCNC: 0.7 MG/DL (ref 0.2–1.1)
BUN SERPL-MCNC: 12 MG/DL (ref 6–23)
CALCIUM SERPL-MCNC: 8.4 MG/DL (ref 8.3–10.4)
CHLORIDE SERPL-SCNC: 107 MMOL/L (ref 98–107)
CO2 SERPL-SCNC: 31 MMOL/L (ref 21–32)
CREAT SERPL-MCNC: 1 MG/DL (ref 0.6–1)
DIFFERENTIAL METHOD BLD: ABNORMAL
EOSINOPHIL # BLD: 0.1 K/UL (ref 0–0.8)
EOSINOPHIL NFR BLD: 1 % (ref 0.5–7.8)
ERYTHROCYTE [DISTWIDTH] IN BLOOD BY AUTOMATED COUNT: 13.2 % (ref 11.9–14.6)
GLOBULIN SER CALC-MCNC: 3.3 G/DL (ref 2.3–3.5)
GLUCOSE SERPL-MCNC: 57 MG/DL (ref 65–100)
HCG UR QL: NEGATIVE
HCT VFR BLD AUTO: 35.3 % (ref 35.8–46.3)
HGB BLD-MCNC: 11.7 G/DL (ref 11.7–15.4)
IMM GRANULOCYTES # BLD AUTO: 0 K/UL (ref 0–0.5)
IMM GRANULOCYTES NFR BLD AUTO: 0 % (ref 0–5)
LIPASE SERPL-CCNC: 79 U/L (ref 73–393)
LYMPHOCYTES # BLD: 1.2 K/UL (ref 0.5–4.6)
LYMPHOCYTES NFR BLD: 29 % (ref 13–44)
MCH RBC QN AUTO: 30.8 PG (ref 26.1–32.9)
MCHC RBC AUTO-ENTMCNC: 33.1 G/DL (ref 31.4–35)
MCV RBC AUTO: 92.9 FL (ref 79.6–97.8)
MONOCYTES # BLD: 0.5 K/UL (ref 0.1–1.3)
MONOCYTES NFR BLD: 12 % (ref 4–12)
NEUTS SEG # BLD: 2.4 K/UL (ref 1.7–8.2)
NEUTS SEG NFR BLD: 57 % (ref 43–78)
NRBC # BLD: 0 K/UL (ref 0–0.2)
PLATELET # BLD AUTO: 165 K/UL (ref 150–450)
PMV BLD AUTO: 9.6 FL (ref 9.4–12.3)
POTASSIUM SERPL-SCNC: 4.2 MMOL/L (ref 3.5–5.1)
PROT SERPL-MCNC: 6.7 G/DL (ref 6.3–8.2)
RBC # BLD AUTO: 3.8 M/UL (ref 4.05–5.2)
SODIUM SERPL-SCNC: 141 MMOL/L (ref 136–145)
WBC # BLD AUTO: 4.3 K/UL (ref 4.3–11.1)

## 2021-12-04 PROCEDURE — 81003 URINALYSIS AUTO W/O SCOPE: CPT

## 2021-12-04 PROCEDURE — 81025 URINE PREGNANCY TEST: CPT

## 2021-12-04 PROCEDURE — 75810000275 HC EMERGENCY DEPT VISIT NO LEVEL OF CARE

## 2021-12-04 PROCEDURE — 83690 ASSAY OF LIPASE: CPT

## 2021-12-04 PROCEDURE — 85025 COMPLETE CBC W/AUTO DIFF WBC: CPT

## 2021-12-04 PROCEDURE — 80053 COMPREHEN METABOLIC PANEL: CPT

## 2021-12-04 RX ORDER — SODIUM CHLORIDE 0.9 % (FLUSH) 0.9 %
5-10 SYRINGE (ML) INJECTION EVERY 8 HOURS
Status: DISCONTINUED | OUTPATIENT
Start: 2021-12-04 | End: 2021-12-04 | Stop reason: HOSPADM

## 2021-12-04 RX ORDER — SODIUM CHLORIDE 0.9 % (FLUSH) 0.9 %
5-10 SYRINGE (ML) INJECTION AS NEEDED
Status: DISCONTINUED | OUTPATIENT
Start: 2021-12-04 | End: 2021-12-04 | Stop reason: HOSPADM

## 2021-12-04 NOTE — ED TRIAGE NOTES
Patient ambulatory to triage with mask in place. Patient reports n/v, abd pain and dysuria. Pt also reports muslce cramps and swelling in her left foot.

## 2021-12-06 LAB
BILIRUB UR QL: NEGATIVE
GLUCOSE UR QL STRIP.AUTO: NEGATIVE MG/DL
KETONES UR-MCNC: NEGATIVE MG/DL
LEUKOCYTE ESTERASE UR QL STRIP: NEGATIVE
NITRITE UR QL: NEGATIVE
PH UR: 6.5 [PH] (ref 5–9)
PROT UR QL: NEGATIVE MG/DL
RBC # UR STRIP: ABNORMAL /UL
SP GR UR: 1.02 (ref 1–1.02)
UROBILINOGEN UR QL: 0.2 EU/DL (ref 0.2–1)

## 2021-12-17 PROBLEM — F10.20 ALCOHOL USE DISORDER, SEVERE, DEPENDENCE (HCC): Status: ACTIVE | Noted: 2017-10-11

## 2021-12-17 PROBLEM — F11.21 OPIOID USE DISORDER, SEVERE, IN EARLY REMISSION (HCC): Status: ACTIVE | Noted: 2017-10-11

## 2021-12-17 PROBLEM — F25.1 SCHIZOAFFECTIVE DISORDER, DEPRESSIVE TYPE (HCC): Status: ACTIVE | Noted: 2017-11-08

## 2021-12-17 PROBLEM — R29.818 EXTRAPYRAMIDAL SYMPTOM: Status: ACTIVE | Noted: 2017-10-19

## 2021-12-17 PROBLEM — F10.939 ALCOHOL WITHDRAWAL SYNDROME WITH COMPLICATION (HCC): Status: ACTIVE | Noted: 2017-10-11

## 2021-12-17 PROBLEM — B37.31 VAGINAL CANDIDA: Status: ACTIVE | Noted: 2017-11-06

## 2021-12-17 PROBLEM — F15.90 STIMULANT USE DISORDER: Status: ACTIVE | Noted: 2017-10-11

## 2021-12-17 PROBLEM — F29 PSYCHOSIS (HCC): Status: ACTIVE | Noted: 2019-11-20

## 2021-12-17 PROBLEM — F43.12 CHRONIC POST-TRAUMATIC STRESS DISORDER (PTSD): Status: ACTIVE | Noted: 2017-10-11

## 2022-01-03 ENCOUNTER — HOSPITAL ENCOUNTER (EMERGENCY)
Age: 42
Discharge: HOME | End: 2022-01-03

## 2022-03-15 ENCOUNTER — HOSPITAL ENCOUNTER (EMERGENCY)
Age: 42
Discharge: HOME | End: 2022-03-15
Attending: EMERGENCY MEDICINE
Payer: MEDICARE

## 2022-03-15 VITALS
TEMPERATURE: 98.3 F | HEART RATE: 89 BPM | DIASTOLIC BLOOD PRESSURE: 59 MMHG | SYSTOLIC BLOOD PRESSURE: 105 MMHG | RESPIRATION RATE: 18 BRPM | OXYGEN SATURATION: 99 %

## 2022-03-15 DIAGNOSIS — N30.00 ACUTE CYSTITIS WITHOUT HEMATURIA: ICD-10-CM

## 2022-03-15 DIAGNOSIS — N76.0 BV (BACTERIAL VAGINOSIS): ICD-10-CM

## 2022-03-15 DIAGNOSIS — N89.8 VAGINAL DISCHARGE: Primary | ICD-10-CM

## 2022-03-15 DIAGNOSIS — B96.89 BV (BACTERIAL VAGINOSIS): ICD-10-CM

## 2022-03-15 LAB
BACTERIA URNS QL MICRO: ABNORMAL /HPF
BILIRUB UR QL: NEGATIVE
CASTS URNS QL MICRO: 0 /LPF
CRYSTALS URNS QL MICRO: 0 /LPF
EPI CELLS #/AREA URNS HPF: ABNORMAL /HPF
GLUCOSE UR QL STRIP.AUTO: NEGATIVE MG/DL
KETONES UR-MCNC: NEGATIVE MG/DL
LEUKOCYTE ESTERASE UR QL STRIP: ABNORMAL
MUCOUS THREADS URNS QL MICRO: 0 /LPF
NITRITE UR QL: NEGATIVE
PH UR: 7 [PH] (ref 5–9)
PROT UR QL: ABNORMAL MG/DL
RBC # UR STRIP: ABNORMAL /UL
RBC #/AREA URNS HPF: ABNORMAL /HPF
SERVICE CMNT-IMP: ABNORMAL
SERVICE CMNT-IMP: NORMAL
SP GR UR: 1.02 (ref 1–1.02)
UROBILINOGEN UR QL: 0.2 EU/DL (ref 0.2–1)
WBC URNS QL MICRO: >100 /HPF
WET PREP GENITAL: NORMAL

## 2022-03-15 PROCEDURE — 87210 SMEAR WET MOUNT SALINE/INK: CPT

## 2022-03-15 PROCEDURE — 74011250636 HC RX REV CODE- 250/636: Performed by: PHYSICIAN ASSISTANT

## 2022-03-15 PROCEDURE — 87491 CHLMYD TRACH DNA AMP PROBE: CPT

## 2022-03-15 PROCEDURE — 81015 MICROSCOPIC EXAM OF URINE: CPT

## 2022-03-15 PROCEDURE — 74011000250 HC RX REV CODE- 250: Performed by: PHYSICIAN ASSISTANT

## 2022-03-15 PROCEDURE — 74011250637 HC RX REV CODE- 250/637: Performed by: PHYSICIAN ASSISTANT

## 2022-03-15 PROCEDURE — 99284 EMERGENCY DEPT VISIT MOD MDM: CPT

## 2022-03-15 PROCEDURE — 96372 THER/PROPH/DIAG INJ SC/IM: CPT

## 2022-03-15 PROCEDURE — 81003 URINALYSIS AUTO W/O SCOPE: CPT

## 2022-03-15 RX ORDER — CEPHALEXIN 500 MG/1
500 CAPSULE ORAL 2 TIMES DAILY
Qty: 10 CAPSULE | Refills: 0 | Status: SHIPPED | OUTPATIENT
Start: 2022-03-15 | End: 2022-03-20

## 2022-03-15 RX ORDER — ONDANSETRON 4 MG/1
4 TABLET, ORALLY DISINTEGRATING ORAL
Status: COMPLETED | OUTPATIENT
Start: 2022-03-15 | End: 2022-03-15

## 2022-03-15 RX ORDER — METRONIDAZOLE 500 MG/1
2000 TABLET ORAL
Status: COMPLETED | OUTPATIENT
Start: 2022-03-15 | End: 2022-03-15

## 2022-03-15 RX ORDER — DOXYCYCLINE HYCLATE 100 MG
100 TABLET ORAL 2 TIMES DAILY
Qty: 20 TABLET | Refills: 0 | Status: SHIPPED | OUTPATIENT
Start: 2022-03-15 | End: 2022-03-25

## 2022-03-15 RX ORDER — DOXYCYCLINE 100 MG/1
100 CAPSULE ORAL
Status: COMPLETED | OUTPATIENT
Start: 2022-03-15 | End: 2022-03-15

## 2022-03-15 RX ORDER — ONDANSETRON 4 MG/1
4 TABLET, ORALLY DISINTEGRATING ORAL
Qty: 20 TABLET | Refills: 0 | Status: SHIPPED | OUTPATIENT
Start: 2022-03-15

## 2022-03-15 RX ORDER — METRONIDAZOLE 500 MG/1
500 TABLET ORAL 2 TIMES DAILY
Qty: 14 TABLET | Refills: 0 | Status: SHIPPED | OUTPATIENT
Start: 2022-03-15 | End: 2022-03-22

## 2022-03-15 RX ADMIN — ONDANSETRON 4 MG: 4 TABLET, ORALLY DISINTEGRATING ORAL at 14:26

## 2022-03-15 RX ADMIN — DOXYCYCLINE HYCLATE 100 MG: 100 CAPSULE ORAL at 14:27

## 2022-03-15 RX ADMIN — LIDOCAINE HYDROCHLORIDE 500 MG: 10 INJECTION, SOLUTION INFILTRATION; PERINEURAL at 14:26

## 2022-03-15 RX ADMIN — METRONIDAZOLE 2000 MG: 500 TABLET ORAL at 14:27

## 2022-03-15 NOTE — ED PROVIDER NOTES
Patient is a 82-year-old female who presents with a chief foul-smelling vaginal discharge for a few weeks. She has had trichomonas in the past and believes this may be what is causing her symptoms. She is worried about STDs. She has urinary pressure but no dysuria frequency urgency. She denies dyspareunia. No fevers. Past Medical History:   Diagnosis Date    Alcohol withdrawal syndrome with complication ([facility].) 64/45/4429    Anxiety and depression     anxiety, depression    Back pain     problems with L4-5 vertebra    Common bile duct dilatation 6/12/2013    Depression     Gall bladder disease 6/12/2013    Psychiatric disorder     Vaginal candida 11/6/2017       Past Surgical History:   Procedure Laterality Date    HX CHOLECYSTECTOMY      HX ORTHOPAEDIC  2007    left foot, tonail excision         Family History:   Problem Relation Age of Onset    Cancer Paternal Grandmother         skin cancer       Social History     Socioeconomic History    Marital status:      Spouse name: Not on file    Number of children: Not on file    Years of education: Not on file    Highest education level: Not on file   Occupational History    Not on file   Tobacco Use    Smoking status: Never Smoker    Smokeless tobacco: Not on file   Substance and Sexual Activity    Alcohol use: No    Drug use: No    Sexual activity: Never     Birth control/protection: None   Other Topics Concern    Not on file   Social History Narrative    Not on file     Social Determinants of Health     Financial Resource Strain:     Difficulty of Paying Living Expenses: Not on file   Food Insecurity:     Worried About Running Out of Food in the Last Year: Not on file    Shayy of Food in the Last Year: Not on file   Transportation Needs:     Lack of Transportation (Medical): Not on file    Lack of Transportation (Non-Medical):  Not on file   Physical Activity:     Days of Exercise per Week: Not on file    Minutes of Exercise per Session: Not on file   Stress:     Feeling of Stress : Not on file   Social Connections:     Frequency of Communication with Friends and Family: Not on file    Frequency of Social Gatherings with Friends and Family: Not on file    Attends Judaism Services: Not on file    Active Member of Clubs or Organizations: Not on file    Attends Club or Organization Meetings: Not on file    Marital Status: Not on file   Intimate Partner Violence:     Fear of Current or Ex-Partner: Not on file    Emotionally Abused: Not on file    Physically Abused: Not on file    Sexually Abused: Not on file   Housing Stability:     Unable to Pay for Housing in the Last Year: Not on file    Number of [place] in the Last Year: Not on file    Unstable Housing in the Last Year: Not on file         ALLERGIES: Patient has no known allergies. Review of Systems   Constitutional: Negative. HENT: Negative. Respiratory: Negative. Cardiovascular: Negative. Gastrointestinal: Negative. Genitourinary: Positive for vaginal discharge. All other systems reviewed and are negative. Vitals:    03/15/22 1254   BP: (!) 105/59   Pulse: 89   Resp: 18   Temp: 98.3 °F (36.8 °C)   SpO2: 99%            Physical Exam  Vitals and nursing note reviewed. Constitutional:       General: She is not in acute distress. Appearance: Normal appearance. She is normal weight. She is not ill-appearing or toxic-appearing. HENT:      Head: Normocephalic. Cardiovascular:      Rate and Rhythm: Normal rate and regular rhythm. Pulmonary:      Effort: Pulmonary effort is normal.      Breath sounds: Normal breath sounds. Abdominal:      General: Bowel sounds are normal.      Palpations: Abdomen is soft. There is no mass. Tenderness: There is no abdominal tenderness. There is no guarding or rebound. Genitourinary:     Comments: Bora Hui RN present for exam.  Patient has yellowish-green thin vaginal discharge. Nonmalodorous. No cervical motion tenderness. No bleeding or masses noted. No foreign bodies. No uterine or adnexal tenderness. Musculoskeletal:         General: Normal range of motion. Cervical back: Normal range of motion. Skin:     General: Skin is warm and dry. Findings: No rash. Neurological:      General: No focal deficit present. Mental Status: She is alert. Motor: No weakness. Gait: Gait normal.   Psychiatric:         Mood and Affect: Mood normal.         Behavior: Behavior normal.         Thought Content: Thought content normal.          MDM  Number of Diagnoses or Management Options  Acute cystitis without hematuria  BV (bacterial vaginosis)  Vaginal discharge  Diagnosis management comments: Patient is 53-year-old female who presents with vaginal discharge. Treated for gonorrhea chlamydia and trichomoniasis. Found to have UTI and bacterial vaginosis. Given prescription for doxycycline, Keflex, Flagyl, and Zofran per her request.  She is no evidence of PID. She is well-appearing. Return precautions given.        Amount and/or Complexity of Data Reviewed  Clinical lab tests: reviewed    Risk of Complications, Morbidity, and/or Mortality  Presenting problems: low  Diagnostic procedures: low  Management options: low    Patient Progress  Patient progress: stable         Procedures

## 2022-03-15 NOTE — ED NOTES
I have reviewed discharge instructions with the patient. The patient verbalized understanding. Patient left ED via Discharge Method: ambulatory to Home with self. Opportunity for questions and clarification provided. Patient given 4 scripts. To continue your aftercare when you leave the hospital, you may receive an automated call from our care team to check in on how you are doing. This is a free service and part of our promise to provide the best care and service to meet your aftercare needs.  If you have questions, or wish to unsubscribe from this service please call [phone]. Thank you for Choosing our [facility] Emergency Department.

## 2022-03-15 NOTE — ED TRIAGE NOTES
Pt ambulatory to triage. Pt reports that she has had vaginal discharge for a few weeks, yellow in color, foul smelling. Pt states she had a known exposure to an STD, unsure of what it was. Pt is also having low pelvic pain, as well as hematuria. Pt denies dysuria, fever/chills, n/v/d, chest pain, shob.

## 2022-03-18 PROBLEM — F15.90 STIMULANT USE DISORDER: Status: ACTIVE | Noted: 2017-10-11

## 2022-03-18 PROBLEM — F43.12 CHRONIC POST-TRAUMATIC STRESS DISORDER (PTSD): Status: ACTIVE | Noted: 2017-10-11

## 2022-03-18 PROBLEM — F10.939 ALCOHOL WITHDRAWAL SYNDROME WITH COMPLICATION (HCC): Status: ACTIVE | Noted: 2017-10-11

## 2022-03-19 PROBLEM — G25.79 SEROTONIN SYNDROME: Status: ACTIVE | Noted: 2017-04-27

## 2022-03-19 PROBLEM — F19.10 POLYSUBSTANCE ABUSE (HCC): Status: ACTIVE | Noted: 2017-04-27

## 2022-03-19 PROBLEM — F11.21 OPIOID USE DISORDER, SEVERE, IN EARLY REMISSION (HCC): Status: ACTIVE | Noted: 2017-10-11

## 2022-03-19 PROBLEM — F29 PSYCHOSIS (HCC): Status: ACTIVE | Noted: 2019-11-20

## 2022-03-19 PROBLEM — R79.89 ELEVATED LFTS: Status: ACTIVE | Noted: 2017-04-27

## 2022-03-19 PROBLEM — F10.20 ALCOHOL USE DISORDER, SEVERE, DEPENDENCE (HCC): Status: ACTIVE | Noted: 2017-10-11

## 2022-03-19 PROBLEM — G90.81 SEROTONIN SYNDROME: Status: ACTIVE | Noted: 2017-04-27

## 2022-03-20 PROBLEM — B37.31 VAGINAL CANDIDA: Status: ACTIVE | Noted: 2017-11-06

## 2022-03-20 PROBLEM — R41.82 ALTERED MENTAL STATUS: Status: ACTIVE | Noted: 2017-04-27

## 2022-03-20 PROBLEM — F25.1 SCHIZOAFFECTIVE DISORDER, DEPRESSIVE TYPE (HCC): Status: ACTIVE | Noted: 2017-11-08

## 2022-03-20 PROBLEM — R29.818 EXTRAPYRAMIDAL SYMPTOM: Status: ACTIVE | Noted: 2017-10-19

## 2022-11-17 ENCOUNTER — HOSPITAL ENCOUNTER (INPATIENT)
Age: 42
LOS: 7 days | Discharge: HOME | DRG: 853 | End: 2022-11-24
Attending: EMERGENCY MEDICINE | Admitting: HOSPITALIST
Payer: MEDICARE

## 2022-11-17 ENCOUNTER — HOSPITAL ENCOUNTER (EMERGENCY)
Dept: GENERAL RADIOLOGY | Age: 42
Discharge: HOME | DRG: 853 | End: 2022-11-20
Payer: MEDICARE

## 2022-11-17 DIAGNOSIS — S61.209A FINGER WOUND, SIMPLE, OPEN, INITIAL ENCOUNTER: Primary | ICD-10-CM

## 2022-11-17 DIAGNOSIS — L08.9 FINGER INFECTION: ICD-10-CM

## 2022-11-17 DIAGNOSIS — L02.511 ABSCESS OF RIGHT INDEX FINGER: ICD-10-CM

## 2022-11-17 PROBLEM — F15.90 STIMULANT USE DISORDER: Status: ACTIVE | Noted: 2017-10-11

## 2022-11-17 PROBLEM — F43.12 CHRONIC POST-TRAUMATIC STRESS DISORDER (PTSD): Status: ACTIVE | Noted: 2017-10-11

## 2022-11-17 LAB
ALBUMIN SERPL-MCNC: 3.4 G/DL (ref 3.5–5)
ALBUMIN/GLOB SERPL: 0.9 {RATIO} (ref 0.4–1.6)
ALP SERPL-CCNC: 83 U/L (ref 50–136)
ALT SERPL-CCNC: 41 U/L (ref 12–65)
ANION GAP SERPL CALC-SCNC: 1 MMOL/L (ref 2–11)
AST SERPL-CCNC: 18 U/L (ref 15–37)
BASOPHILS # BLD: 0 K/UL (ref 0–0.2)
BASOPHILS NFR BLD: 0 % (ref 0–2)
BILIRUB SERPL-MCNC: 0.3 MG/DL (ref 0.2–1.1)
BUN SERPL-MCNC: 7 MG/DL (ref 6–23)
CALCIUM SERPL-MCNC: 8.9 MG/DL (ref 8.3–10.4)
CHLORIDE SERPL-SCNC: 106 MMOL/L (ref 101–110)
CO2 SERPL-SCNC: 30 MMOL/L (ref 21–32)
CREAT SERPL-MCNC: 0.7 MG/DL (ref 0.6–1)
DIFFERENTIAL METHOD BLD: ABNORMAL
EKG ATRIAL RATE: 89 BPM
EKG DIAGNOSIS: NORMAL
EKG P AXIS: 76 DEGREES
EKG P-R INTERVAL: 140 MS
EKG Q-T INTERVAL: 356 MS
EKG QRS DURATION: 92 MS
EKG QTC CALCULATION (BAZETT): 433 MS
EKG R AXIS: 75 DEGREES
EKG T AXIS: 70 DEGREES
EKG VENTRICULAR RATE: 89 BPM
EOSINOPHIL # BLD: 0.1 K/UL (ref 0–0.8)
EOSINOPHIL NFR BLD: 1 % (ref 0.5–7.8)
ERYTHROCYTE [DISTWIDTH] IN BLOOD BY AUTOMATED COUNT: 14 % (ref 11.9–14.6)
GLOBULIN SER CALC-MCNC: 4 G/DL (ref 2.8–4.5)
GLUCOSE SERPL-MCNC: 140 MG/DL (ref 65–100)
HCT VFR BLD AUTO: 38.2 % (ref 35.8–46.3)
HGB BLD-MCNC: 12.1 G/DL (ref 11.7–15.4)
IMM GRANULOCYTES # BLD AUTO: 0.1 K/UL (ref 0–0.5)
IMM GRANULOCYTES NFR BLD AUTO: 1 % (ref 0–5)
LACTATE SERPL-SCNC: 1.8 MMOL/L (ref 0.4–2)
LYMPHOCYTES # BLD: 1 K/UL (ref 0.5–4.6)
LYMPHOCYTES NFR BLD: 9 % (ref 13–44)
MCH RBC QN AUTO: 28.3 PG (ref 26.1–32.9)
MCHC RBC AUTO-ENTMCNC: 31.7 G/DL (ref 31.4–35)
MCV RBC AUTO: 89.3 FL (ref 82–102)
MONOCYTES # BLD: 0.9 K/UL (ref 0.1–1.3)
MONOCYTES NFR BLD: 8 % (ref 4–12)
NEUTS SEG # BLD: 9.4 K/UL (ref 1.7–8.2)
NEUTS SEG NFR BLD: 81 % (ref 43–78)
NRBC # BLD: 0 K/UL (ref 0–0.2)
PLATELET # BLD AUTO: 232 K/UL (ref 150–450)
PMV BLD AUTO: 9 FL (ref 9.4–12.3)
POTASSIUM SERPL-SCNC: 3.8 MMOL/L (ref 3.5–5.1)
PROCALCITONIN SERPL-MCNC: <0.05 NG/ML (ref 0–0.49)
PROT SERPL-MCNC: 7.4 G/DL (ref 6.3–8.2)
RBC # BLD AUTO: 4.28 M/UL (ref 4.05–5.2)
SODIUM SERPL-SCNC: 137 MMOL/L (ref 133–143)
WBC # BLD AUTO: 11.5 K/UL (ref 4.3–11.1)

## 2022-11-17 PROCEDURE — 85025 COMPLETE CBC W/AUTO DIFF WBC: CPT

## 2022-11-17 PROCEDURE — 87186 SC STD MICRODIL/AGAR DIL: CPT

## 2022-11-17 PROCEDURE — 6370000000 HC RX 637 (ALT 250 FOR IP): Performed by: FAMILY MEDICINE

## 2022-11-17 PROCEDURE — 83605 ASSAY OF LACTIC ACID: CPT

## 2022-11-17 PROCEDURE — 6360000002 HC RX W HCPCS: Performed by: STUDENT IN AN ORGANIZED HEALTH CARE EDUCATION/TRAINING PROGRAM

## 2022-11-17 PROCEDURE — 73130 X-RAY EXAM OF HAND: CPT

## 2022-11-17 PROCEDURE — 87040 BLOOD CULTURE FOR BACTERIA: CPT

## 2022-11-17 PROCEDURE — 99285 EMERGENCY DEPT VISIT HI MDM: CPT | Performed by: EMERGENCY MEDICINE

## 2022-11-17 PROCEDURE — 2580000003 HC RX 258: Performed by: FAMILY MEDICINE

## 2022-11-17 PROCEDURE — 93005 ELECTROCARDIOGRAM TRACING: CPT | Performed by: STUDENT IN AN ORGANIZED HEALTH CARE EDUCATION/TRAINING PROGRAM

## 2022-11-17 PROCEDURE — 96365 THER/PROPH/DIAG IV INF INIT: CPT | Performed by: EMERGENCY MEDICINE

## 2022-11-17 PROCEDURE — 96376 TX/PRO/DX INJ SAME DRUG ADON: CPT | Performed by: EMERGENCY MEDICINE

## 2022-11-17 PROCEDURE — 1100000000 HC RM PRIVATE

## 2022-11-17 PROCEDURE — 71045 X-RAY EXAM CHEST 1 VIEW: CPT

## 2022-11-17 PROCEDURE — 80053 COMPREHEN METABOLIC PANEL: CPT

## 2022-11-17 PROCEDURE — 87075 CULTR BACTERIA EXCEPT BLOOD: CPT

## 2022-11-17 PROCEDURE — 84145 PROCALCITONIN (PCT): CPT

## 2022-11-17 PROCEDURE — 2580000003 HC RX 258: Performed by: STUDENT IN AN ORGANIZED HEALTH CARE EDUCATION/TRAINING PROGRAM

## 2022-11-17 PROCEDURE — 87205 SMEAR GRAM STAIN: CPT

## 2022-11-17 PROCEDURE — 6360000002 HC RX W HCPCS: Performed by: FAMILY MEDICINE

## 2022-11-17 PROCEDURE — 96375 TX/PRO/DX INJ NEW DRUG ADDON: CPT | Performed by: EMERGENCY MEDICINE

## 2022-11-17 PROCEDURE — 6370000000 HC RX 637 (ALT 250 FOR IP): Performed by: STUDENT IN AN ORGANIZED HEALTH CARE EDUCATION/TRAINING PROGRAM

## 2022-11-17 PROCEDURE — 0J9J0ZZ DRAINAGE OF RIGHT HAND SUBCUTANEOUS TISSUE AND FASCIA, OPEN APPROACH: ICD-10-PCS | Performed by: STUDENT IN AN ORGANIZED HEALTH CARE EDUCATION/TRAINING PROGRAM

## 2022-11-17 PROCEDURE — 87077 CULTURE AEROBIC IDENTIFY: CPT

## 2022-11-17 RX ORDER — SODIUM CHLORIDE 0.9 % (FLUSH) 0.9 %
5-40 SYRINGE (ML) INJECTION EVERY 12 HOURS SCHEDULED
Status: DISCONTINUED | OUTPATIENT
Start: 2022-11-17 | End: 2022-11-24 | Stop reason: HOSPADM

## 2022-11-17 RX ORDER — ONDANSETRON 2 MG/ML
4 INJECTION INTRAMUSCULAR; INTRAVENOUS
Status: COMPLETED | OUTPATIENT
Start: 2022-11-17 | End: 2022-11-17

## 2022-11-17 RX ORDER — ACETAMINOPHEN 650 MG/1
650 SUPPOSITORY RECTAL EVERY 6 HOURS PRN
Status: DISCONTINUED | OUTPATIENT
Start: 2022-11-17 | End: 2022-11-24 | Stop reason: HOSPADM

## 2022-11-17 RX ORDER — HYDROMORPHONE HYDROCHLORIDE 1 MG/ML
1 INJECTION, SOLUTION INTRAMUSCULAR; INTRAVENOUS; SUBCUTANEOUS
Status: COMPLETED | OUTPATIENT
Start: 2022-11-17 | End: 2022-11-17

## 2022-11-17 RX ORDER — BENZTROPINE MESYLATE 1 MG/1
0.5 TABLET ORAL EVERY 8 HOURS
Status: DISCONTINUED | OUTPATIENT
Start: 2022-11-17 | End: 2022-11-24 | Stop reason: HOSPADM

## 2022-11-17 RX ORDER — SODIUM CHLORIDE 9 MG/ML
INJECTION, SOLUTION INTRAVENOUS PRN
Status: DISCONTINUED | OUTPATIENT
Start: 2022-11-17 | End: 2022-11-24 | Stop reason: HOSPADM

## 2022-11-17 RX ORDER — POLYETHYLENE GLYCOL 3350 17 G/17G
17 POWDER, FOR SOLUTION ORAL DAILY PRN
Status: DISCONTINUED | OUTPATIENT
Start: 2022-11-17 | End: 2022-11-24 | Stop reason: HOSPADM

## 2022-11-17 RX ORDER — ONDANSETRON 4 MG/1
4 TABLET, ORALLY DISINTEGRATING ORAL EVERY 8 HOURS PRN
Status: DISCONTINUED | OUTPATIENT
Start: 2022-11-17 | End: 2022-11-24 | Stop reason: HOSPADM

## 2022-11-17 RX ORDER — OLANZAPINE 2.5 MG/1
7.5 TABLET ORAL NIGHTLY
Status: DISCONTINUED | OUTPATIENT
Start: 2022-11-17 | End: 2022-11-24 | Stop reason: HOSPADM

## 2022-11-17 RX ORDER — LIDOCAINE HYDROCHLORIDE 10 MG/ML
5 INJECTION, SOLUTION INFILTRATION; PERINEURAL ONCE
Status: DISCONTINUED | OUTPATIENT
Start: 2022-11-17 | End: 2022-11-24 | Stop reason: HOSPADM

## 2022-11-17 RX ORDER — PRAZOSIN HYDROCHLORIDE 1 MG/1
2 CAPSULE ORAL NIGHTLY
Status: DISCONTINUED | OUTPATIENT
Start: 2022-11-17 | End: 2022-11-24 | Stop reason: HOSPADM

## 2022-11-17 RX ORDER — ONDANSETRON 2 MG/ML
4 INJECTION INTRAMUSCULAR; INTRAVENOUS EVERY 6 HOURS PRN
Status: DISCONTINUED | OUTPATIENT
Start: 2022-11-17 | End: 2022-11-24 | Stop reason: HOSPADM

## 2022-11-17 RX ORDER — IBUPROFEN 400 MG/1
400 TABLET ORAL EVERY 6 HOURS PRN
Status: DISCONTINUED | OUTPATIENT
Start: 2022-11-17 | End: 2022-11-19

## 2022-11-17 RX ORDER — ACETAMINOPHEN 500 MG
1000 TABLET ORAL
Status: COMPLETED | OUTPATIENT
Start: 2022-11-17 | End: 2022-11-17

## 2022-11-17 RX ORDER — OXYCODONE HYDROCHLORIDE 5 MG/1
5 TABLET ORAL EVERY 4 HOURS PRN
Status: DISCONTINUED | OUTPATIENT
Start: 2022-11-17 | End: 2022-11-19

## 2022-11-17 RX ORDER — 0.9 % SODIUM CHLORIDE 0.9 %
1000 INTRAVENOUS SOLUTION INTRAVENOUS
Status: COMPLETED | OUTPATIENT
Start: 2022-11-17 | End: 2022-11-17

## 2022-11-17 RX ORDER — ACETAMINOPHEN 325 MG/1
650 TABLET ORAL EVERY 6 HOURS PRN
Status: DISCONTINUED | OUTPATIENT
Start: 2022-11-17 | End: 2022-11-24 | Stop reason: HOSPADM

## 2022-11-17 RX ORDER — MORPHINE SULFATE 4 MG/ML
4 INJECTION INTRAVENOUS ONCE
Status: COMPLETED | OUTPATIENT
Start: 2022-11-17 | End: 2022-11-17

## 2022-11-17 RX ORDER — SODIUM CHLORIDE 0.9 % (FLUSH) 0.9 %
5-40 SYRINGE (ML) INJECTION PRN
Status: DISCONTINUED | OUTPATIENT
Start: 2022-11-17 | End: 2022-11-24 | Stop reason: HOSPADM

## 2022-11-17 RX ADMIN — VANCOMYCIN HYDROCHLORIDE 750 MG: 750 INJECTION, POWDER, LYOPHILIZED, FOR SOLUTION INTRAVENOUS at 17:26

## 2022-11-17 RX ADMIN — OXYCODONE 5 MG: 5 TABLET ORAL at 21:36

## 2022-11-17 RX ADMIN — OXYCODONE 5 MG: 5 TABLET ORAL at 17:26

## 2022-11-17 RX ADMIN — BENZTROPINE MESYLATE 0.5 MG: 1 TABLET ORAL at 23:02

## 2022-11-17 RX ADMIN — SODIUM CHLORIDE 1000 ML: 9 INJECTION, SOLUTION INTRAVENOUS at 11:32

## 2022-11-17 RX ADMIN — OLANZAPINE 7.5 MG: 5 TABLET, FILM COATED ORAL at 20:56

## 2022-11-17 RX ADMIN — ONDANSETRON 4 MG: 2 INJECTION INTRAMUSCULAR; INTRAVENOUS at 14:38

## 2022-11-17 RX ADMIN — MORPHINE SULFATE 4 MG: 4 INJECTION INTRAVENOUS at 11:40

## 2022-11-17 RX ADMIN — ACETAMINOPHEN 1000 MG: 500 TABLET ORAL at 11:33

## 2022-11-17 RX ADMIN — ONDANSETRON 4 MG: 2 INJECTION INTRAMUSCULAR; INTRAVENOUS at 11:33

## 2022-11-17 RX ADMIN — IBUPROFEN 400 MG: 400 TABLET, FILM COATED ORAL at 20:56

## 2022-11-17 RX ADMIN — SODIUM CHLORIDE, PRESERVATIVE FREE 10 ML: 5 INJECTION INTRAVENOUS at 23:02

## 2022-11-17 RX ADMIN — ACETAMINOPHEN 650 MG: 325 TABLET, FILM COATED ORAL at 21:36

## 2022-11-17 RX ADMIN — CEFTRIAXONE 1000 MG: 1 INJECTION, POWDER, FOR SOLUTION INTRAMUSCULAR; INTRAVENOUS at 11:32

## 2022-11-17 RX ADMIN — HYDROMORPHONE HYDROCHLORIDE 1 MG: 1 INJECTION, SOLUTION INTRAMUSCULAR; INTRAVENOUS; SUBCUTANEOUS at 14:38

## 2022-11-17 ASSESSMENT — ENCOUNTER SYMPTOMS
EYE DISCHARGE: 0
PHOTOPHOBIA: 0
SINUS PRESSURE: 0
VOICE CHANGE: 0
APNEA: 0
RHINORRHEA: 0
COLOR CHANGE: 0
WHEEZING: 0
CONSTIPATION: 0
SORE THROAT: 0
VOMITING: 0
ABDOMINAL PAIN: 0
DIARRHEA: 0
CHEST TIGHTNESS: 0
EYE PAIN: 0
BLOOD IN STOOL: 0
EYE REDNESS: 0
NAUSEA: 0
COUGH: 0
SHORTNESS OF BREATH: 0

## 2022-11-17 ASSESSMENT — PAIN - FUNCTIONAL ASSESSMENT
PAIN_FUNCTIONAL_ASSESSMENT: 0-10
PAIN_FUNCTIONAL_ASSESSMENT: 0-10

## 2022-11-17 ASSESSMENT — PAIN DESCRIPTION - LOCATION
LOCATION: FINGER (COMMENT WHICH ONE)

## 2022-11-17 ASSESSMENT — PAIN SCALES - GENERAL
PAINLEVEL_OUTOF10: 10
PAINLEVEL_OUTOF10: 9
PAINLEVEL_OUTOF10: 8
PAINLEVEL_OUTOF10: 8
PAINLEVEL_OUTOF10: 5

## 2022-11-17 ASSESSMENT — PAIN DESCRIPTION - PAIN TYPE: TYPE: ACUTE PAIN

## 2022-11-17 ASSESSMENT — PAIN DESCRIPTION - ORIENTATION: ORIENTATION: RIGHT

## 2022-11-17 NOTE — H&P
Hospitalist Admission History and Physical         NAME:            Gayle Talamantes    Age:                38 y.o.    :               1980    MRN:              [id number]    PCP: Damon Villarreal MD    Consulting MD:    Treatment Team: Attending Provider: Janet Tanner DO; Physician Assistant: DENICE Villeda; Registered Nurse: Gómez Tan RN         Chief Complaint   Patient presents with    Finger Pain   HPI:    Patient is a 38 y.o. female who presented to the ED for cc pain/swelling/drainage to right index finger for the past 4 days. No noted trauma but states she has been manipulating at home to help relieve pressure. Nothing seems to make better or worse. Hx of methamphetamine abuse, schizoaffective disorder, bipolar d/o, PTSD. WBC 11.5    Right hand x ray -   Severe soft tissue swelling of the index finger without acute underlying   osteoarticular process evident by x-ray. No foreign body. Past Medical History:   Diagnosis Date    Alcohol withdrawal syndrome with complication ([facility]     Anxiety and depression     anxiety, depression    Back pain     problems with L4-5 vertebra    Common bile duct dilatation 2013    Depression     Gall bladder disease 2013    Psychiatric disorder     Vaginal candida 2017            Past Surgical History:   Procedure Laterality Date    CHOLECYSTECTOMY      ORTHOPEDIC SURGERY  2007    left foot, tonail excision            Family History   Problem Relation Age of Onset    Cancer Paternal Grandmother         skin cancer       Family history reviewed and negative except as noted above.          Social History     Social History Narrative    Not on file            Social History     Tobacco Use    Smoking status: Never    Smokeless tobacco: Not on file   Substance Use Topics    Alcohol use: No            Social History     Substance and Sexual Activity   Drug Use No                 No Known Allergies         Prior to Admission medications    Medication Sig Start Date End Date Taking?  Authorizing Provider   benztropine (COGENTIN) 0.5 MG tablet Take 0.5 mg by mouth 2 times daily 10/14/21   Ar Automatic Reconciliation   benztropine (COGENTIN) 1 MG tablet Take 1 mg by mouth 3 times daily as needed 2/7/20 5/23/22  Ar Automatic Reconciliation   divalproex (DEPAKOTE) 500 MG DR tablet TAKE 2 TABLETS BY MOUTH AT BEDTIME 10/14/21   Ar Automatic Reconciliation   levETIRAcetam (KEPPRA) 500 MG tablet Take 500 mg by mouth 2 times daily    Ar Automatic Reconciliation   magnesium citrate (CITROMA) SOLN Take 296 mLs by mouth daily as needed    Ar Automatic Reconciliation   mirtazapine (REMERON) 15 MG tablet Take 15 mg by mouth    Ar Automatic Reconciliation   ondansetron (ZOFRAN-ODT) 4 MG disintegrating tablet Take 4 mg by mouth every 8 hours as needed 3/15/22   Ar Automatic Reconciliation   prazosin (MINIPRESS) 2 MG capsule TAKE 1 CAPSULE BY MOUTH AT BEDTIME 2/7/20   Ar Automatic Reconciliation   QUEtiapine (SEROQUEL) 100 MG tablet Take 100 mg by mouth 2/7/20 5/23/22  Ar Automatic Reconciliation   QUEtiapine (SEROQUEL) 200 MG tablet TAKE 2 TABLETS BY MOUTH AT BEDTIME 10/14/21   Ar Automatic Reconciliation   senna (SENOKOT) 8.6 MG tablet Take 1 tablet by mouth 2 times daily    Ar Automatic Reconciliation   traZODone (DESYREL) 50 MG tablet Take 50 mg by mouth    Ar Automatic Reconciliation                      Review of Systems         Constitutional: NAD    Eyes:  no change in visual acuity, no photophobia    Ears, nose, mouth, throat, and face: no  Odynphagia, dysphagia, no thrush or exudate, negative for chronic sinus congestion, recurrent headaches    Respiratory: negative for SOB, hemoptysis or cough    Cardiovascular: negative for CP, palpitations, or PND    Gastrointestinal: negative for abdominal pain, no hematemesis, hematochezia or BRBPR    Genitourinary: no urgency, frequency, or dysuria, no nocturia    Integument/breast: skin lesion to right index finger. Hematologic/lymphatic: negative for known bleeding disorder    Musculoskeletal:cannot flex right index finger. Swelling to right index finger. Neurological: negative for lightheadedness, syncope or presyncopal events, no seizure or CVA history    Behavioral/Psych: negative for depression or chronic anxiety,    Endocrine: negative for polydyspia, polyuria or intolerance to heat or cold    Allergic/Immunologic: negative for chronic allergic rhinitis, or known connective tissue disorder              Objective:         Patient Vitals for the past 24 hrs:   Temp Pulse Resp BP SpO2   11/17/22 1056 98.2 °F (36.8 °C) 90 18 (!) 145/103 98 %            No intake/output data recorded. No intake/output data recorded.          Data Review:   Recent Results (from the past 24 hour(s))   EKG 12 Lead    Collection Time: 11/17/22 11:04 AM   Result Value Ref Range    Ventricular Rate 89 BPM    Atrial Rate 89 BPM    P-R Interval 140 ms    QRS Duration 92 ms    Q-T Interval 356 ms    QTc Calculation (Bazett) 433 ms    P Axis 76 degrees    R Axis 75 degrees    T Axis 70 degrees    Diagnosis Normal sinus rhythm    Lactate, Sepsis    Collection Time: 11/17/22 11:05 AM   Result Value Ref Range    Lactic Acid, Sepsis 1.8 0.4 - 2.0 MMOL/L   CBC with Auto Differential    Collection Time: 11/17/22 11:05 AM   Result Value Ref Range    WBC 11.5 (H) 4.3 - 11.1 K/uL    RBC 4.28 4.05 - 5.2 M/uL    Hemoglobin 12.1 11.7 - 15.4 g/dL    Hematocrit 38.2 35.8 - 46.3 %    MCV 89.3 82 - 102 FL    MCH 28.3 26.1 - 32.9 PG    MCHC 31.7 31.4 - 35.0 g/dL    RDW 14.0 11.9 - 14.6 %    Platelets 767 376 - 910 K/uL    MPV 9.0 (L) 9.4 - 12.3 FL    nRBC 0.00 0.0 - 0.2 K/uL    Differential Type AUTOMATED      Seg Neutrophils 81 (H) 43 - 78 %    Lymphocytes 9 (L) 13 - 44 %    Monocytes 8 4.0 - 12.0 %    Eosinophils % 1 0.5 - 7.8 %    Basophils 0 0.0 - 2.0 %    Immature Granulocytes 1 0.0 - 5.0 %    Segs Absolute 9.4 (H) 1.7 - 8.2 K/UL    Absolute Lymph # 1.0 0.5 - 4.6 K/UL    Absolute Mono # 0.9 0.1 - 1.3 K/UL    Absolute Eos # 0.1 0.0 - 0.8 K/UL    Basophils Absolute 0.0 0.0 - 0.2 K/UL    Absolute Immature Granulocyte 0.1 0.0 - 0.5 K/UL   CMP    Collection Time: 11/17/22 11:05 AM   Result Value Ref Range    Sodium 137 133 - 143 mmol/L    Potassium 3.8 3.5 - 5.1 mmol/L    Chloride 106 101 - 110 mmol/L    CO2 30 21 - 32 mmol/L    Anion Gap 1 (L) 2 - 11 mmol/L    Glucose 140 (H) 65 - 100 mg/dL    BUN 7 6 - 23 MG/DL    Creatinine 0.70 0.6 - 1.0 MG/DL    Est, Glom Filt Rate >60 >60 ml/min/1.73m2    Calcium 8.9 8.3 - 10.4 MG/DL    Total Bilirubin 0.3 0.2 - 1.1 MG/DL    ALT 41 12 - 65 U/L    AST 18 15 - 37 U/L    Alk Phosphatase 83 50 - 136 U/L    Total Protein 7.4 6.3 - 8.2 g/dL    Albumin 3.4 (L) 3.5 - 5.0 g/dL    Globulin 4.0 2.8 - 4.5 g/dL    Albumin/Globulin Ratio 0.9 0.4 - 1.6     Procalcitonin    Collection Time: 11/17/22 11:05 AM   Result Value Ref Range    Procalcitonin <0.05 0.00 - 0.49 ng/mL            Physical Exam:         General:    Alert, cooperative    Eyes:    Conjunctivae/corneas clear. PERRL    Ears:    Normal     Nose:    Nares normal.    Mouth/Throat:    Lips, mucosa, and tongue normal. Teeth and gums normal.    Neck:    no JVD. Back:     deferred    Lungs:     Clear to auscultation bilaterally. Heart:    Regular rate and rhythm, S1, S2 normal    Abdomen:     Soft, non-tender. Bowel sounds normal. No masses,  No organomegaly. Extremities:    Good right radial pulse. Is able to flex/extend right wrist. No necrotic tissue to right index finger but erythema, edema noted and she is unable to flex right index finger. Good sensation to right index finger. Tender to palpation. No obvious drainage. Skin:    As above     Neurologic:    CNII-XII intact. Normal strength, sensation and reflexes throughout.          Assessment and Plan         Principal Problem:    Finger wound, simple, open, initial encounter  Active Problems:    Chronic post-traumatic stress disorder (PTSD)    Stimulant use disorder  Resolved Problems:    * No resolved hospital problems. *    Right index finger wound - Since limited ROM, will consult ortho to see in AM. Start Ceftriaxone and Vancomycin today. Blood cultures ordered. PRN Roxicodone    Hx of drug abuse - Order UDS. She denies current use. Occasional drinker per the patient     Psych hx - continue home meds of cogentin TID, Minipress at night, and new med of Zyprexa 7.5mg at night. She does not want to take her Seroquel. Denies any other meds at home.        DVT prophylaxis- SCDs  Signed By:    Saadia Flores DO    November 17, 2022

## 2022-11-17 NOTE — PROGRESS NOTES
VANCO DAILY FOLLOW UP NOTE  [address] [facility] Pharmacokinetic Monitoring Service - Vancomycin    Consulting Provider: Dr. Amaya Wang   Indication: SSTI  Target Concentration: Goal trough of 10-15 mg/L and AUC/ANDREA <500 mg*hr/L  Day of Therapy: 1  Additional Antimicrobials: ceftriaxone    Patient eligible for piperacillin-tazobactam to cefepime auto-substitution per P&T approved protocol? NO    Pertinent Laboratory Values: Wt Readings from Last 1 Encounters:   11/17/22 108 lb (49 kg)     Temp Readings from Last 1 Encounters:   11/17/22 98.2 °F (36.8 °C) (Oral)     Recent Labs     11/17/22  1105   BUN 7   CREATININE 0.70   WBC 11.5*   PROCAL <0.05     Estimated Creatinine Clearance: 83 mL/min (based on SCr of 0.7 mg/dL). No results found for: Rj Montoya    MRSA Nasal Swab: N/A. Non-respiratory infection.       Assessment:  Date/Time Dose Concentration AUC         Note: Serum concentrations collected for AUC dosing may appear elevated if collected in close proximity to the dose administered, this is not necessarily an indication of toxicity    Plan:  Dosing recommendations based on Bayesian software  Start vancomycin 750 mg q12h  Anticipated AUC of 441 and trough concentration of 13 at steady state  Renal labs as indicated   Vancomycin concentrations will be ordered as clinically appropriate   Pharmacy will continue to monitor patient and adjust therapy as indicated    Thank you for the consult,  Mati Young [facility]

## 2022-11-17 NOTE — ED PROVIDER NOTES
Emergency Department Provider Note                   PCP:                Amanuel Cardoza MD               Age: 38 y.o. Sex: female       ICD-10-CM    1. Finger infection  L08.9           DISPOSITION Admitted 11/17/2022 03:05:59 PM  ===================================  ED EKG Interpretation  EKG was interpreted in the absence of a cardiologist.    Rate: 89  EKG Interpretation: EKG Interpretation: sinus rhythm  ST Segments: Normal ST segments - NO STEMI    DENICE YODER 3:30 PM         MDM  Number of Diagnoses or Management Options  Finger infection  Diagnosis management comments: 82-year-old female coming in today for finger swelling and pain of her right index finger. Appears to be infected with overlying abscess. Appears to have originated from a small wound to the medial aspect of the distal right index finger. Lower suspicion for flexor tenosynovitis as the pain and swelling does not appear to be along the flexor tendon. Sepsis was suspected out of triage so the patient had blood cultures, lab work, antibiotics started. Pain control given. I did attempt to herminio the abscess over the dorsal aspect of the index finger with success. Large amounts of purulent fluid was expressed. There did appear to be more purulent fluid deeper in the finger however I did not want to blindly herminio farther due to concern over neurovascular structures and tendons. Due to the deep nature of the abscess, feel that hand surgery should evaluate the patient for potential surgical washout. I did consult orthopedic on-call to advise him of this plan. He was agreeable. Consulted hospitalist to get patient admitted. Patient has been admitted. Pain is controlled with IV medications. She is stable at this time.        Amount and/or Complexity of Data Reviewed  Clinical lab tests: ordered and reviewed  Tests in the radiology section of CPT®: ordered and reviewed  Discuss the patient with other providers: yes ( Davonte Nichols of ortho; Dr. Tommy Weinstein of hospitalist service)  Independent visualization of images, tracings, or specimens: yes    Risk of Complications, Morbidity, and/or Mortality  Presenting problems: moderate  Diagnostic procedures: moderate  Management options: moderate    Patient Progress  Patient progress: improved       ED Course as of 11/17/22 1530   Thu Nov 17, 2022   1453 2:53 PM  Finger lanced with copious drainage expressed. Wound culture obtained. [NR]   5278 3:14 PM  Talked with ortho Maria E Louise to discuss patient. Talked with hospitaist Paris Jacobo to admit the patient. Patient admitted [NR]      ED Course User Index  [NR] DENICE Alvarez        Orders Placed This Encounter   Procedures    INCISION AND DRAINAGE    Culture, Blood 1    Culture, Blood 2    Culture, Wound Aerobic Only    Culture, Anaerobic    XR CHEST PORTABLE    XR HAND RIGHT (MIN 3 VIEWS)    Lactate, Sepsis    CBC with Auto Differential    CMP    Procalcitonin    Drug Screen Psych, Urine    Basic Metabolic Panel w/ Reflex to MG    CBC with Auto Differential    ADULT DIET;  Regular    Cardiac Monitor - ED Only    POCT Urine Dipstick    Vital signs per unit routine    Up with assistance    Place intermittent pneumatic compression device    Full code    Inpatient consult to Orthopedic Surgery    Pharmacy to Dose: Vancomycin; Skin and Soft Tissue Infection; 7 days    Initiate Oxygen Therapy Protocol    EKG 12 Lead    Saline lock IV    ADMIT TO INPATIENT        Medications   lidocaine 1 % injection 5 mL (has no administration in time range)   sodium chloride flush 0.9 % injection 5-40 mL (has no administration in time range)   sodium chloride flush 0.9 % injection 5-40 mL (has no administration in time range)   0.9 % sodium chloride infusion (has no administration in time range)   ondansetron (ZOFRAN-ODT) disintegrating tablet 4 mg (has no administration in time range)     Or   ondansetron (ZOFRAN) injection 4 mg (has no administration in time range)   polyethylene glycol (GLYCOLAX) packet 17 g (has no administration in time range)   acetaminophen (TYLENOL) tablet 650 mg (has no administration in time range)     Or   acetaminophen (TYLENOL) suppository 650 mg (has no administration in time range)   oxyCODONE (ROXICODONE) immediate release tablet 5 mg (has no administration in time range)   benztropine (COGENTIN) tablet 0.5 mg (has no administration in time range)   prazosin (MINIPRESS) capsule 2 mg (has no administration in time range)   OLANZapine (ZYPREXA) tablet 7.5 mg (has no administration in time range)   cefTRIAXone (ROCEPHIN) 1,000 mg in sodium chloride 0.9 % 50 mL IVPB mini-bag (has no administration in time range)   0.9 % sodium chloride bolus (0 mLs IntraVENous Stopped 11/17/22 1334)   cefTRIAXone (ROCEPHIN) 1,000 mg in sodium chloride 0.9 % 50 mL IVPB mini-bag (0 mg IntraVENous Stopped 11/17/22 1334)   morphine injection 4 mg (4 mg IntraVENous Given 11/17/22 1140)   ondansetron (ZOFRAN) injection 4 mg (4 mg IntraVENous Given 11/17/22 1133)   acetaminophen (TYLENOL) tablet 1,000 mg (1,000 mg Oral Given 11/17/22 1133)   HYDROmorphone HCl PF (DILAUDID) injection 1 mg (1 mg IntraVENous Given 11/17/22 1438)   ondansetron (ZOFRAN) injection 4 mg (4 mg IntraVENous Given 11/17/22 1438)       New Prescriptions    No medications on file        Sukh Fitzgerald is a 38 y.o. female who presents to the Emergency Department with chief complaint of    Chief Complaint   Patient presents with    Finger Pain      38year old female patient presents today complaining of right index finger pain and swelling for the past four days. Reports that it started as a small bump on the medial side of her distal finger and has since spread. Reports there appears to be yellowish drainage inside of it. Reports the entire finger swollen. Rates it as constant and moderate to severe in severity.   Worse with movements and better with rest.  States the pain seems to extend up her arm. Denies IV drug abuse. Denies fevers. Denies lymphangitic streaking. Denies any known injury. Denies all other symptoms today. Patient is primary historian and quality does seem reliable. The history is provided by the patient. No  was used. Review of Systems   Constitutional:  Negative for appetite change, chills, fatigue, fever and unexpected weight change. HENT:  Negative for congestion, ear pain, hearing loss, postnasal drip, rhinorrhea, sinus pressure, sore throat and voice change. Eyes:  Negative for photophobia, pain, discharge, redness and visual disturbance. Respiratory:  Negative for apnea, cough, chest tightness, shortness of breath and wheezing. Cardiovascular:  Negative for chest pain, palpitations and leg swelling. Gastrointestinal:  Negative for abdominal pain, blood in stool, constipation, diarrhea, nausea and vomiting. Endocrine: Negative for polydipsia, polyphagia and polyuria. Genitourinary:  Negative for decreased urine volume, dysuria, flank pain, frequency and hematuria. Musculoskeletal:  Positive for arthralgias and joint swelling. Negative for myalgias and neck stiffness. Skin:  Positive for wound. Negative for color change and rash. Neurological:  Negative for dizziness, syncope, speech difficulty, weakness, light-headedness and headaches. Hematological:  Negative for adenopathy. Does not bruise/bleed easily. Psychiatric/Behavioral:  Negative for confusion, self-injury, sleep disturbance and suicidal ideas. All other systems reviewed and are negative.     Past Medical History:   Diagnosis Date    Alcohol withdrawal syndrome with complication ([facility] [place] [facility] 91/23/9616    Anxiety and depression     anxiety, depression    Back pain     problems with L4-5 vertebra    Common bile duct dilatation 6/12/2013    Depression     Gall bladder disease 6/12/2013    Psychiatric disorder     Vaginal candida 11/6/2017        Past Surgical History:   Procedure Laterality Date    CHOLECYSTECTOMY      ORTHOPEDIC SURGERY  2007    left foot, tonail excision        Family History   Problem Relation Age of Onset    Cancer Paternal Grandmother         skin cancer        Social History     Socioeconomic History    Marital status:      Spouse name: None    Number of children: None    Years of education: None    Highest education level: None   Tobacco Use    Smoking status: Never   Substance and Sexual Activity    Alcohol use: No    Drug use: No         Patient has no known allergies. Previous Medications    BENZTROPINE (COGENTIN) 0.5 MG TABLET    Take 0.5 mg by mouth 2 times daily    BENZTROPINE (COGENTIN) 1 MG TABLET    Take 1 mg by mouth 3 times daily as needed    DIVALPROEX (DEPAKOTE) 500 MG DR TABLET    TAKE 2 TABLETS BY MOUTH AT BEDTIME    LEVETIRACETAM (KEPPRA) 500 MG TABLET    Take 500 mg by mouth 2 times daily    MAGNESIUM CITRATE (CITROMA) SOLN    Take 296 mLs by mouth daily as needed    MIRTAZAPINE (REMERON) 15 MG TABLET    Take 15 mg by mouth    ONDANSETRON (ZOFRAN-ODT) 4 MG DISINTEGRATING TABLET    Take 4 mg by mouth every 8 hours as needed    PRAZOSIN (MINIPRESS) 2 MG CAPSULE    TAKE 1 CAPSULE BY MOUTH AT BEDTIME    QUETIAPINE (SEROQUEL) 100 MG TABLET    Take 100 mg by mouth    QUETIAPINE (SEROQUEL) 200 MG TABLET    TAKE 2 TABLETS BY MOUTH AT BEDTIME    SENNA (SENOKOT) 8.6 MG TABLET    Take 1 tablet by mouth 2 times daily    TRAZODONE (DESYREL) 50 MG TABLET    Take 50 mg by mouth        Vitals signs and nursing note reviewed. Patient Vitals for the past 4 hrs:   BP SpO2   11/17/22 1500 124/80 98 %            Physical Exam  Vitals and nursing note reviewed. Constitutional:       General: She is not in acute distress. Appearance: Normal appearance. She is not ill-appearing, toxic-appearing or diaphoretic. HENT:      Head: Normocephalic and atraumatic.       Right Ear: Tympanic membrane, ear canal and external ear normal.      Left Ear: Tympanic membrane, ear canal and external ear normal.      Nose: Nose normal.      Mouth/Throat:      Mouth: Mucous membranes are moist.   Eyes:      General: No scleral icterus. Right eye: No discharge. Left eye: No discharge. Conjunctiva/sclera: Conjunctivae normal.   Cardiovascular:      Rate and Rhythm: Normal rate and regular rhythm. Pulses: Normal pulses. Heart sounds: Normal heart sounds. Pulmonary:      Effort: Pulmonary effort is normal. No respiratory distress. Breath sounds: Normal breath sounds. No stridor. No wheezing, rhonchi or rales. Abdominal:      General: Abdomen is flat. Bowel sounds are normal.      Palpations: Abdomen is soft. Tenderness: There is no abdominal tenderness. There is no guarding or rebound. Musculoskeletal:      Right hand: Swelling, tenderness and bony tenderness present. No deformity or lacerations. Decreased range of motion. Normal capillary refill. Normal pulse. Left hand: Normal.      Cervical back: Normal range of motion and neck supple. No rigidity or tenderness. Right lower leg: No edema. Left lower leg: No edema. Comments: Fusiform right index finger swelling and erythema as seen in picture above. There is obvious fluctuance with fluid underneath overlying skin that appears purulent. Appears to have started from a small wound on the medial side as seen in the picture. Does not appear to be flexor tenosynovitis. Does not appear to have any lymphangitic streaking or extension of the hand or arm. Lymphadenopathy:      Cervical: No cervical adenopathy. Skin:     General: Skin is warm and dry. Capillary Refill: Capillary refill takes less than 2 seconds. Coloration: Skin is not jaundiced. Comments: Finger swelling and purulence as seen in picture   Neurological:      General: No focal deficit present.       Mental Status: She is alert and oriented to person, place, and time. Mental status is at baseline. Psychiatric:         Mood and Affect: Mood normal.         Behavior: Behavior normal.         Thought Content:  Thought content normal.         Judgment: Judgment normal.              Incision/Drainage    Date/Time: 11/17/2022 3:27 PM  Performed by: DENICE Saavedra Sa  Authorized by: Robert Noel DO     Consent:     Consent obtained:  Verbal    Consent given by:  Patient    Risks, benefits, and alternatives were discussed: yes      Risks discussed:  Bleeding, incomplete drainage, pain, infection and damage to other organs    Alternatives discussed:  No treatment, delayed treatment, referral and alternative treatment  Universal protocol:     Procedure explained and questions answered to patient or proxy's satisfaction: yes      Relevant documents present and verified: yes      Test results available : yes      Imaging studies available: yes      Required blood products, implants, devices, and special equipment available: yes      Site/side marked: yes      Immediately prior to procedure, a time out was called: yes      Patient identity confirmed:  Verbally with patient and arm band  Location:     Type:  Abscess    Size:  5 cm in diameter    Location:  Upper extremity    Upper extremity location:  Finger    Finger location:  R index finger  Pre-procedure details:     Skin preparation:  Povidone-iodine  Sedation:     Sedation type:  None  Anesthesia:     Anesthesia method:  Local infiltration (right index finger digital block)    Local anesthetic:  Lidocaine 1% w/o epi  Procedure type:     Complexity:  Complex  Procedure details:     Ultrasound guidance: no      Needle aspiration: no      Incision types:  Stab incision    Incision depth:  Subcutaneous    Wound management:  Probed and deloculated and irrigated with saline    Drainage:  Purulent    Drainage amount:  Copious    Wound treatment:  Wound left open    Packing materials:  None  Post-procedure details: Procedure completion:  Tolerated well, no immediate complications  Comments:      Small stab incision was made along the dorsal aspect of finger and area of most fluctuance. Copious purulent fluid was expressed. Cavity not large enough to warrant packing.     Results for orders placed or performed during the hospital encounter of 11/17/22   Incision/Drainage    Narrative    [facility] [id number]     11/17/2022  3:28 PM  Incision/Drainage    Date/Time: 11/17/2022 3:27 PM  Performed by: DENICE Manzano  Authorized by: Mary Sapp DO     Consent:     Consent obtained:  Verbal    Consent given by:  Patient    Risks, benefits, and alternatives were discussed: yes      Risks discussed:  Bleeding, incomplete drainage, pain, infection and   damage to other organs    Alternatives discussed:  No treatment, delayed treatment, referral and   alternative treatment  Universal protocol:     Procedure explained and questions answered to patient or proxy's   satisfaction: yes      Relevant documents present and verified: yes      Test results available : yes      Imaging studies available: yes      Required blood products, implants, devices, and special equipment   available: yes      Site/side marked: yes      Immediately prior to procedure, a time out was called: yes      Patient identity confirmed:  Verbally with patient and arm band  Location:     Type:  Abscess    Size:  5 cm in diameter    Location:  Upper extremity    Upper extremity location:  Finger    Finger location:  R index finger  Pre-procedure details:     Skin preparation:  Povidone-iodine  Sedation:     Sedation type:  None  Anesthesia:     Anesthesia method:  Local infiltration (right index finger digital   block)    Local anesthetic:  Lidocaine 1% w/o epi  Procedure type:     Complexity:  Complex  Procedure details:     Ultrasound guidance: no      Needle aspiration: no      Incision types:  Stab incision    Incision depth:  Subcutaneous    Wound management:  Probed and deloculated and irrigated with saline    Drainage:  Purulent    Drainage amount:  Copious    Wound treatment:  Wound left open    Packing materials:  None  Post-procedure details:     Procedure completion:  Tolerated well, no immediate complications  Comments:      Small stab incision was made along the dorsal aspect of finger and area   of most fluctuance. Copious purulent fluid was expressed. Cavity not   large enough to warrant packing. XR CHEST PORTABLE    Narrative    CHEST X-RAY, 1 VIEW    INDICATION: Sepsis    COMPARISON: None available    TECHNIQUE: Single AP view of the chest was obtained. FINDINGS:     Lungs and pleural spaces: Normal. No pneumothorax or pleural effusion. Cardiomediastinal silhouette: Normal.    Osseous structures: Normal.        Impression    No radiographic evidence of acute cardiopulmonary disease. XR HAND RIGHT (MIN 3 VIEWS)    Narrative    Right HAND RADIOGRAPHS    INDICATION: Indexed her pain and swelling    COMPARISON: None available    TECHNIQUE: 3 views of the right hand were obtained. FINDINGS:  Severe soft tissue swelling throughout the index finger. No fracture,  malalignment, or bone destruction evident. No soft tissue gas or radiopaque  foreign body. Impression    Severe soft tissue swelling of the index finger without acute underlying  osteoarticular process evident by x-ray. No foreign body.      Lactate, Sepsis   Result Value Ref Range    Lactic Acid, Sepsis 1.8 0.4 - 2.0 MMOL/L   CBC with Auto Differential   Result Value Ref Range    WBC 11.5 (H) 4.3 - 11.1 K/uL    RBC 4.28 4.05 - 5.2 M/uL    Hemoglobin 12.1 11.7 - 15.4 g/dL    Hematocrit 38.2 35.8 - 46.3 %    MCV 89.3 82 - 102 FL    MCH 28.3 26.1 - 32.9 PG    MCHC 31.7 31.4 - 35.0 g/dL    RDW 14.0 11.9 - 14.6 %    Platelets 434 783 - 383 K/uL    MPV 9.0 (L) 9.4 - 12.3 FL    nRBC 0.00 0.0 - 0.2 K/uL    Differential Type AUTOMATED      Seg Neutrophils 81 (H) 43 - 78 % Lymphocytes 9 (L) 13 - 44 %    Monocytes 8 4.0 - 12.0 %    Eosinophils % 1 0.5 - 7.8 %    Basophils 0 0.0 - 2.0 %    Immature Granulocytes 1 0.0 - 5.0 %    Segs Absolute 9.4 (H) 1.7 - 8.2 K/UL    Absolute Lymph # 1.0 0.5 - 4.6 K/UL    Absolute Mono # 0.9 0.1 - 1.3 K/UL    Absolute Eos # 0.1 0.0 - 0.8 K/UL    Basophils Absolute 0.0 0.0 - 0.2 K/UL    Absolute Immature Granulocyte 0.1 0.0 - 0.5 K/UL   CMP   Result Value Ref Range    Sodium 137 133 - 143 mmol/L    Potassium 3.8 3.5 - 5.1 mmol/L    Chloride 106 101 - 110 mmol/L    CO2 30 21 - 32 mmol/L    Anion Gap 1 (L) 2 - 11 mmol/L    Glucose 140 (H) 65 - 100 mg/dL    BUN 7 6 - 23 MG/DL    Creatinine 0.70 0.6 - 1.0 MG/DL    Est, Glom Filt Rate >60 >60 ml/min/1.73m2    Calcium 8.9 8.3 - 10.4 MG/DL    Total Bilirubin 0.3 0.2 - 1.1 MG/DL    ALT 41 12 - 65 U/L    AST 18 15 - 37 U/L    Alk Phosphatase 83 50 - 136 U/L    Total Protein 7.4 6.3 - 8.2 g/dL    Albumin 3.4 (L) 3.5 - 5.0 g/dL    Globulin 4.0 2.8 - 4.5 g/dL    Albumin/Globulin Ratio 0.9 0.4 - 1.6     Procalcitonin   Result Value Ref Range    Procalcitonin <0.05 0.00 - 0.49 ng/mL   EKG 12 Lead   Result Value Ref Range    Ventricular Rate 89 BPM    Atrial Rate 89 BPM    P-R Interval 140 ms    QRS Duration 92 ms    Q-T Interval 356 ms    QTc Calculation (Bazett) 433 ms    P Axis 76 degrees    R Axis 75 degrees    T Axis 70 degrees    Diagnosis Normal sinus rhythm         XR CHEST PORTABLE   Final Result   No radiographic evidence of acute cardiopulmonary disease. XR HAND RIGHT (MIN 3 VIEWS)   Final Result   Severe soft tissue swelling of the index finger without acute underlying   osteoarticular process evident by x-ray. No foreign body. Voice dictation software was used during the making of this note. This software is not perfect and grammatical and other typographical errors may be present. This note has not been completely proofread for errors.      Nieves Basilio PA  11/17/22 [address] [place], [place]  11/17/22 [id number]

## 2022-11-17 NOTE — ACP (ADVANCE CARE PLANNING)
[facility] Hospitalist Service  At the heart of better care     Advance Care Planning   Admit Date:  2022 11:07 AM   Name:  Sandro Rose   Age:  38 y.o. Sex:  female  :  1980   MRN:  [id number]   Room:  [facility] 24 is able to make her own decisions:   Yes    Patient / surrogate decision-maker directed code status:  FULL    Patient or surrogate consented to discussion of the current conditions, workup, management plans, prognosis, and the risk for further deterioration. Time spent: 17 minutes in direct discussion.       Signed:  Carola Levine DO

## 2022-11-17 NOTE — ED TRIAGE NOTES
Pt ambulatory to triage for reports of R index finger pain & swelling x 4 days. Pt reports that she noticed white spot & tried to get it out. Pt with severe swelling & discoloration to index finger. Pain radiating up R arm. Pt denies hx of drug use or diabetes. Pt a&ox4. Pt reports 9/10 pain.

## 2022-11-17 NOTE — CONSULTS
INITIAL CONSULTATION    Chief Complaint  Chief Complaint   Patient presents with    Finger Pain       Current Status/[place]  HPI  This right-hand-dominant 19-year-old female states that 4 days ago she began to have pain in her right index finger. She states that as the pain increased the finger became swollen, more painful, and more reddened. She saw a white point at an area of tenderness that she picked at with a pin. She states that she did not get anything out by picking. States that she used peroxide to clean the finger. The finger has steadily become more painful and swollen. She came to the emergency room today and underwent local drainage of the area of swelling by Himanshu Davila. The note describing that procedure indicates that purulent material was obtained and sent for culture. She has been admitted to the care of Dr. Meghana Chase for empiric intravenous antibiotics for treatment of of this painful swollen infected appearing finger  She states that she has not been injecting herself anytime recently. She states that her use of methamphetamine was in her distant past.  She is says she does not recall any animal bites or exposure to splinters or glass that might be related to this problem.     Past Medical History Complicated by  Past Medical History:   Diagnosis Date    Alcohol withdrawal syndrome with complication ([facility] [address] 48/10/0719    Anxiety and depression     anxiety, depression    Back pain     problems with L4-5 vertebra    Common bile duct dilatation 6/12/2013    Depression     Gall bladder disease 6/12/2013    Psychiatric disorder     Vaginal candida 11/6/2017       Past Surgical History  Past Surgical History:   Procedure Laterality Date    CHOLECYSTECTOMY      ORTHOPEDIC SURGERY  2007    left foot, tonail excision       Family History  Family History   Problem Relation Age of Onset    Cancer Paternal Grandmother         skin cancer       Social History  Social History     Socioeconomic History    Marital status:      Spouse name: None    Number of children: None    Years of education: None    Highest education level: None   Tobacco Use    Smoking status: Never   Substance and Sexual Activity    Alcohol use: No    Drug use: No       Allergies  No Known Allergies    Current Medications  Current Facility-Administered Medications   Medication Dose Route Frequency Provider Last Rate Last Admin    lidocaine 1 % injection 5 mL  5 mL IntraDERmal Once [employer], [place]        sodium chloride flush 0.9 % injection 5-40 mL  5-40 mL IntraVENous 2 times per day Minna Jamil, DO        sodium chloride flush 0.9 % injection 5-40 mL  5-40 mL IntraVENous PRN Minna Jamil, DO        0.9 % sodium chloride infusion   IntraVENous PRN Minna Jamil, DO        ondansetron (ZOFRAN-ODT) disintegrating tablet 4 mg  4 mg Oral Q8H PRN Minna Jamil, DO        Or    ondansetron (ZOFRAN) injection 4 mg  4 mg IntraVENous Q6H PRN Minna Jamil, DO        polyethylene glycol (GLYCOLAX) packet 17 g  17 g Oral Daily PRN Minna Jamil, DO        acetaminophen (TYLENOL) tablet 650 mg  650 mg Oral Q6H PRN Minna Jamil, DO        Or    acetaminophen (TYLENOL) suppository 650 mg  650 mg Rectal Q6H PRN Minna Jamil, DO        oxyCODONE (ROXICODONE) immediate release tablet 5 mg  5 mg Oral Q4H PRN Minna Jamil, DO   5 mg at 11/17/22 1726    benztropine (COGENTIN) tablet 0.5 mg  0.5 mg Oral q8h Minna Jamil, DO        prazosin (MINIPRESS) capsule 2 mg  2 mg Oral Nightly Minna Jamil, DO        OLANZapine (ZYPREXA) tablet 7.5 mg  7.5 mg Oral Nightly Minna Jamil, DO        [START ON 11/18/2022] cefTRIAXone (ROCEPHIN) 1,000 mg in sodium chloride 0.9 % 50 mL IVPB mini-bag  1,000 mg IntraVENous Q24H Minna Jamil, DO        vancomycin (VANCOCIN) 750 mg in sodium chloride 0.9 % 250 mL IVPB ([id number])  750 mg IntraVENous Q12H Minna Jamil,  mL/hr at 11/17/22 1726 750 mg at 11/17/22 1726     Current Outpatient Medications   Medication Sig Dispense Refill    benztropine (COGENTIN) 0.5 MG tablet Take 0.5 mg by mouth 2 times daily      benztropine (COGENTIN) 1 MG tablet Take 1 mg by mouth 3 times daily as needed      divalproex (DEPAKOTE) 500 MG DR tablet TAKE 2 TABLETS BY MOUTH AT BEDTIME      levETIRAcetam (KEPPRA) 500 MG tablet Take 500 mg by mouth 2 times daily      magnesium citrate (CITROMA) SOLN Take 296 mLs by mouth daily as needed      mirtazapine (REMERON) 15 MG tablet Take 15 mg by mouth      ondansetron (ZOFRAN-ODT) 4 MG disintegrating tablet Take 4 mg by mouth every 8 hours as needed      prazosin (MINIPRESS) 2 MG capsule TAKE 1 CAPSULE BY MOUTH AT BEDTIME      QUEtiapine (SEROQUEL) 100 MG tablet Take 100 mg by mouth      QUEtiapine (SEROQUEL) 200 MG tablet TAKE 2 TABLETS BY MOUTH AT BEDTIME      senna (SENOKOT) 8.6 MG tablet Take 1 tablet by mouth 2 times daily      traZODone (DESYREL) 50 MG tablet Take 50 mg by mouth         Review of Systems  Review of Systems  Have no significant additions or deletions to the review of systems already recorded in this chart    Physical Exam  Physical Exam  The exam takes place with the patient supine in bed 36 in the [address] [place] [address] emergency room. She is alert oriented and appears to be appropriate in affect as she relates her history. Appears to be a credible historian. Her right index finger is swollen and errythematous primarily distal to the PIP joint. It swollen to about 50% greater diameter than the normal index finger. The fingertip has brisk capillary filling and has intact sensation. The palmar aspect of the index finger is nontender to palpation at the A1 pulley. There is no fluctuance on the palmar aspect of her proximal phalanx. Other right hand fingers have no evidence of infection swelling or flexor tenosynovitis. I do not see red streaks running up her arm.   She is tender at the epitrochlear nodes but they do not feel enlarged. She is tender in her right axilla but I do not feel axillary nodes. Her left upper extremity appears normal for age. I personally reviewed the laboratory studies. My opinion of the plain radiographs is that they show soft tissue swelling and normal bony architecture of the involved index finger  Assessment  This appears to be a soft tissue infection of her right index finger without septic arthritis or purulent tenosynovitis. Plan  I agree with the instituted empiric antibiotics. If she fails to clinically improve substantially in the next 24 to 36 hours my opinion is that she would benefit from evaluation by a hand surgeon for possible irrigation and debridement in the operating room. If significant clinical improvement is noted, it would be reasonable to change to an oral antibiotic at an appropriate clinical improvement point, and based on sensitivities of the pending cultures.

## 2022-11-18 LAB
AMPHET UR QL SCN: NEGATIVE
ANION GAP SERPL CALC-SCNC: 3 MMOL/L (ref 2–11)
BASOPHILS # BLD: 0 K/UL (ref 0–0.2)
BASOPHILS NFR BLD: 0 % (ref 0–2)
BENZODIAZ UR QL: NEGATIVE
BUN SERPL-MCNC: 10 MG/DL (ref 6–23)
CALCIUM SERPL-MCNC: 8.6 MG/DL (ref 8.3–10.4)
CANNABINOIDS UR QL SCN: NEGATIVE
CHLORIDE SERPL-SCNC: 109 MMOL/L (ref 101–110)
CO2 SERPL-SCNC: 28 MMOL/L (ref 21–32)
COCAINE UR QL SCN: NEGATIVE
CREAT SERPL-MCNC: 0.7 MG/DL (ref 0.6–1)
DIFFERENTIAL METHOD BLD: ABNORMAL
EOSINOPHIL # BLD: 0.1 K/UL (ref 0–0.8)
EOSINOPHIL NFR BLD: 1 % (ref 0.5–7.8)
ERYTHROCYTE [DISTWIDTH] IN BLOOD BY AUTOMATED COUNT: 14.2 % (ref 11.9–14.6)
GLUCOSE SERPL-MCNC: 79 MG/DL (ref 65–100)
HCT VFR BLD AUTO: 36.6 % (ref 35.8–46.3)
HGB BLD-MCNC: 11.9 G/DL (ref 11.7–15.4)
IMM GRANULOCYTES # BLD AUTO: 0.1 K/UL (ref 0–0.5)
IMM GRANULOCYTES NFR BLD AUTO: 0 % (ref 0–5)
LYMPHOCYTES # BLD: 1.4 K/UL (ref 0.5–4.6)
LYMPHOCYTES NFR BLD: 10 % (ref 13–44)
MCH RBC QN AUTO: 28.8 PG (ref 26.1–32.9)
MCHC RBC AUTO-ENTMCNC: 32.5 G/DL (ref 31.4–35)
MCV RBC AUTO: 88.6 FL (ref 82–102)
MONOCYTES # BLD: 1.7 K/UL (ref 0.1–1.3)
MONOCYTES NFR BLD: 12 % (ref 4–12)
NEUTS SEG # BLD: 11 K/UL (ref 1.7–8.2)
NEUTS SEG NFR BLD: 77 % (ref 43–78)
NRBC # BLD: 0 K/UL (ref 0–0.2)
OPIATES UR QL: POSITIVE
PLATELET # BLD AUTO: 277 K/UL (ref 150–450)
PMV BLD AUTO: 9.3 FL (ref 9.4–12.3)
POTASSIUM SERPL-SCNC: 3.8 MMOL/L (ref 3.5–5.1)
RBC # BLD AUTO: 4.13 M/UL (ref 4.05–5.2)
SODIUM SERPL-SCNC: 140 MMOL/L (ref 133–143)
WBC # BLD AUTO: 14.2 K/UL (ref 4.3–11.1)

## 2022-11-18 PROCEDURE — 80048 BASIC METABOLIC PNL TOTAL CA: CPT

## 2022-11-18 PROCEDURE — 1100000000 HC RM PRIVATE

## 2022-11-18 PROCEDURE — 6370000000 HC RX 637 (ALT 250 FOR IP): Performed by: FAMILY MEDICINE

## 2022-11-18 PROCEDURE — 2580000003 HC RX 258: Performed by: FAMILY MEDICINE

## 2022-11-18 PROCEDURE — 85025 COMPLETE CBC W/AUTO DIFF WBC: CPT

## 2022-11-18 PROCEDURE — 6360000002 HC RX W HCPCS: Performed by: FAMILY MEDICINE

## 2022-11-18 PROCEDURE — 80307 DRUG TEST PRSMV CHEM ANLYZR: CPT

## 2022-11-18 RX ADMIN — ACETAMINOPHEN 650 MG: 325 TABLET, FILM COATED ORAL at 05:55

## 2022-11-18 RX ADMIN — SODIUM CHLORIDE, PRESERVATIVE FREE 10 ML: 5 INJECTION INTRAVENOUS at 21:19

## 2022-11-18 RX ADMIN — IBUPROFEN 400 MG: 400 TABLET, FILM COATED ORAL at 23:04

## 2022-11-18 RX ADMIN — CEFTRIAXONE 1000 MG: 1 INJECTION, POWDER, FOR SOLUTION INTRAMUSCULAR; INTRAVENOUS at 11:53

## 2022-11-18 RX ADMIN — OLANZAPINE 7.5 MG: 5 TABLET, FILM COATED ORAL at 21:18

## 2022-11-18 RX ADMIN — VANCOMYCIN HYDROCHLORIDE 750 MG: 750 INJECTION, POWDER, LYOPHILIZED, FOR SOLUTION INTRAVENOUS at 16:40

## 2022-11-18 RX ADMIN — OXYCODONE 5 MG: 5 TABLET ORAL at 03:07

## 2022-11-18 RX ADMIN — OXYCODONE 5 MG: 5 TABLET ORAL at 11:50

## 2022-11-18 RX ADMIN — ACETAMINOPHEN 650 MG: 325 TABLET, FILM COATED ORAL at 14:30

## 2022-11-18 RX ADMIN — IBUPROFEN 400 MG: 400 TABLET, FILM COATED ORAL at 04:11

## 2022-11-18 RX ADMIN — OXYCODONE 5 MG: 5 TABLET ORAL at 16:44

## 2022-11-18 RX ADMIN — SODIUM CHLORIDE: 9 INJECTION, SOLUTION INTRAVENOUS at 03:12

## 2022-11-18 RX ADMIN — PRAZOSIN HYDROCHLORIDE 2 MG: 1 CAPSULE ORAL at 21:19

## 2022-11-18 RX ADMIN — OXYCODONE 5 MG: 5 TABLET ORAL at 21:16

## 2022-11-18 RX ADMIN — BENZTROPINE MESYLATE 0.5 MG: 1 TABLET ORAL at 12:05

## 2022-11-18 RX ADMIN — ONDANSETRON 4 MG: 4 TABLET, ORALLY DISINTEGRATING ORAL at 16:55

## 2022-11-18 RX ADMIN — BENZTROPINE MESYLATE 0.5 MG: 1 TABLET ORAL at 21:18

## 2022-11-18 RX ADMIN — VANCOMYCIN HYDROCHLORIDE 750 MG: 750 INJECTION, POWDER, LYOPHILIZED, FOR SOLUTION INTRAVENOUS at 03:16

## 2022-11-18 RX ADMIN — SODIUM CHLORIDE, PRESERVATIVE FREE 10 ML: 5 INJECTION INTRAVENOUS at 11:54

## 2022-11-18 ASSESSMENT — PAIN SCALES - GENERAL
PAINLEVEL_OUTOF10: 3
PAINLEVEL_OUTOF10: 0
PAINLEVEL_OUTOF10: 3
PAINLEVEL_OUTOF10: 6
PAINLEVEL_OUTOF10: 6
PAINLEVEL_OUTOF10: 8
PAINLEVEL_OUTOF10: 6
PAINLEVEL_OUTOF10: 2
PAINLEVEL_OUTOF10: 0
PAINLEVEL_OUTOF10: 0
PAINLEVEL_OUTOF10: 8
PAINLEVEL_OUTOF10: 8

## 2022-11-18 ASSESSMENT — PAIN DESCRIPTION - LOCATION
LOCATION: FINGER (COMMENT WHICH ONE);HAND
LOCATION: OTHER (COMMENT)
LOCATION: FINGER (COMMENT WHICH ONE);HAND
LOCATION: FINGER (COMMENT WHICH ONE)

## 2022-11-18 ASSESSMENT — PAIN DESCRIPTION - DESCRIPTORS
DESCRIPTORS: STABBING
DESCRIPTORS: ACHING
DESCRIPTORS: ACHING
DESCRIPTORS: ACHING;SHARP;SHOOTING
DESCRIPTORS: SHOOTING
DESCRIPTORS: ACHING

## 2022-11-18 ASSESSMENT — PAIN DESCRIPTION - ONSET: ONSET: AWAKENED FROM SLEEP

## 2022-11-18 ASSESSMENT — PAIN DESCRIPTION - ORIENTATION
ORIENTATION: RIGHT
ORIENTATION: RIGHT
ORIENTATION: LEFT
ORIENTATION: RIGHT

## 2022-11-18 ASSESSMENT — PAIN DESCRIPTION - PAIN TYPE
TYPE: ACUTE PAIN
TYPE: ACUTE PAIN

## 2022-11-18 ASSESSMENT — PAIN DESCRIPTION - FREQUENCY: FREQUENCY: CONTINUOUS

## 2022-11-18 NOTE — PROGRESS NOTES
Hospitalist Progress Note   Admit Date:  2022 11:07 AM   Name:  Brice Garcia   Age:  38 y.o. Sex:  female  :  1980   MRN:  [id number]   Room:  ER    Reason(s) for Admission: Finger wound, simple, open, initial encounter [facility] Course & Interval History:   Ms. Adriana Acosta is a 39 y/o female with a h/o polysubstance abuse and PTSD who was admitted to our service on  with cellulitis of the R index finger. No recent trauma or injury to the area. Labs unremarkable on admission. The area was lanced by the ER team with purulent material noted. X-ray showed soft tissue swelling without foreign body or other concerning findings. She was started on vancomycin and ceftriaxone. Orthopedic Surgery was consulted. Cultures are pending. Subjective/24hr Events (22): Resting in bed, c/o R finger pain, marginal improvement after I&D last evening. No chest pain, SOB, N/V/D. Assessment & Plan:   # Cellulitis of R index finger   - WBCs up some today, . S/p I&D in ER, wound culture pending. Continue vancomycin and ceftriaxone, blood cultures also pending. Afebrile. Ortho is following for possible washout if she does not improve with conservative management. Will make NPO this morning in case a surgical plan is made for today, if not she can eat. # PTSD   - Con't home medications    # H/o polysubstance abuse     Discharge Planning: Home when able. Diet:  ADULT DIET;  Regular  DVT PPx:   Code status: Full Code    Hospital Problems             Last Modified POA    * (Principal) Finger wound, simple, open, initial encounter 2022 Yes    Chronic post-traumatic stress disorder (PTSD) 2022 Yes    Stimulant use disorder 2022 Yes       Objective:   Patient Vitals for the past 24 hrs:   Temp Pulse Resp BP SpO2   22 0338 98.2 °F (36.8 °C) 84 18 121/76 98 %   22 2258 100 °F (37.8 °C) 89 20 110/74 96 %   22 2134 100.2 °F (37.9 °C) 93 -- 129/86 96 % 11/17/22 1719 -- -- -- 117/78 97 %   11/17/22 1500 -- -- -- 124/80 98 %   11/17/22 1056 98.2 °F (36.8 °C) 90 18 (!) 145/103 98 %       Estimated body mass index is 16.92 kg/m² as calculated from the following:    Height as of this encounter: 5' 7\" (1.702 m). Weight as of this encounter: 108 lb (49 kg). Intake/Output Summary (Last 24 hours) at 11/18/2022 0746  Last data filed at 11/18/2022 0453  Gross per 24 hour   Intake 500 ml   Output --   Net 500 ml         Physical Exam:   Blood pressure 121/76, pulse 84, temperature 98.2 °F (36.8 °C), temperature source Oral, resp. rate 18, height 5' 7\" (1.702 m), weight 108 lb (49 kg), SpO2 98 %. General:    Well nourished, thin. No overt distress. Head:  Normocephalic, atraumatic  Eyes:  Sclerae appear normal. Pupils equally round. ENT:  Nares appear normal, no drainage. Moist oral mucosa  Neck:  No restricted ROM. Trachea midline. CV:   RRR. No m/r/g. No jugular venous distension. Lungs:   CTAB. No wheezing, rhonchi, or rales. Respirations even, unlabored. Abdomen: Bowel sounds present. Soft, nontender, nondistended. Extremities: No cyanosis or clubbing. The R index finger is swollen from the knuckle to the DIP joint, there is erythema and purulent drainage from the dorsum of the finger and the anterior aspect, it is tender to palpation. Picture in media section of chart. Skin:     No rashes and normal coloration. Warm and dry. Neuro:  CN II-XII grossly intact. Sensation intact. A&Ox3  Psych:  Normal mood and affect.       I have reviewed ordered lab tests and independently visualized imaging below:    Recent Labs:  Recent Results (from the past 48 hour(s))   EKG 12 Lead    Collection Time: 11/17/22 11:04 AM   Result Value Ref Range    Ventricular Rate 89 BPM    Atrial Rate 89 BPM    P-R Interval 140 ms    QRS Duration 92 ms    Q-T Interval 356 ms    QTc Calculation (Bazett) 433 ms    P Axis 76 degrees    R Axis 75 degrees    T Axis 70 degrees Diagnosis Normal sinus rhythm    Culture, Blood 1    Collection Time: 11/17/22 11:05 AM    Specimen: Blood   Result Value Ref Range    Special Requests LEFT  Antecubital        Culture NO GROWTH AFTER 19 HOURS     Lactate, Sepsis    Collection Time: 11/17/22 11:05 AM   Result Value Ref Range    Lactic Acid, Sepsis 1.8 0.4 - 2.0 MMOL/L   CBC with Auto Differential    Collection Time: 11/17/22 11:05 AM   Result Value Ref Range    WBC 11.5 (H) 4.3 - 11.1 K/uL    RBC 4.28 4.05 - 5.2 M/uL    Hemoglobin 12.1 11.7 - 15.4 g/dL    Hematocrit 38.2 35.8 - 46.3 %    MCV 89.3 82 - 102 FL    MCH 28.3 26.1 - 32.9 PG    MCHC 31.7 31.4 - 35.0 g/dL    RDW 14.0 11.9 - 14.6 %    Platelets 697 145 - 686 K/uL    MPV 9.0 (L) 9.4 - 12.3 FL    nRBC 0.00 0.0 - 0.2 K/uL    Differential Type AUTOMATED      Seg Neutrophils 81 (H) 43 - 78 %    Lymphocytes 9 (L) 13 - 44 %    Monocytes 8 4.0 - 12.0 %    Eosinophils % 1 0.5 - 7.8 %    Basophils 0 0.0 - 2.0 %    Immature Granulocytes 1 0.0 - 5.0 %    Segs Absolute 9.4 (H) 1.7 - 8.2 K/UL    Absolute Lymph # 1.0 0.5 - 4.6 K/UL    Absolute Mono # 0.9 0.1 - 1.3 K/UL    Absolute Eos # 0.1 0.0 - 0.8 K/UL    Basophils Absolute 0.0 0.0 - 0.2 K/UL    Absolute Immature Granulocyte 0.1 0.0 - 0.5 K/UL   CMP    Collection Time: 11/17/22 11:05 AM   Result Value Ref Range    Sodium 137 133 - 143 mmol/L    Potassium 3.8 3.5 - 5.1 mmol/L    Chloride 106 101 - 110 mmol/L    CO2 30 21 - 32 mmol/L    Anion Gap 1 (L) 2 - 11 mmol/L    Glucose 140 (H) 65 - 100 mg/dL    BUN 7 6 - 23 MG/DL    Creatinine 0.70 0.6 - 1.0 MG/DL    Est, Glom Filt Rate >60 >60 ml/min/1.73m2    Calcium 8.9 8.3 - 10.4 MG/DL    Total Bilirubin 0.3 0.2 - 1.1 MG/DL    ALT 41 12 - 65 U/L    AST 18 15 - 37 U/L    Alk Phosphatase 83 50 - 136 U/L    Total Protein 7.4 6.3 - 8.2 g/dL    Albumin 3.4 (L) 3.5 - 5.0 g/dL    Globulin 4.0 2.8 - 4.5 g/dL    Albumin/Globulin Ratio 0.9 0.4 - 1.6     Procalcitonin    Collection Time: 11/17/22 11:05 AM   Result Value Ref Range    Procalcitonin <0.05 0.00 - 0.49 ng/mL   Culture, Blood 2    Collection Time: 11/17/22 11:18 AM    Specimen: Blood   Result Value Ref Range    Special Requests LEFT  HAND        Culture NO GROWTH AFTER 19 HOURS     Basic Metabolic Panel w/ Reflex to MG    Collection Time: 11/18/22  3:41 AM   Result Value Ref Range    Sodium 140 133 - 143 mmol/L    Potassium 3.8 3.5 - 5.1 mmol/L    Chloride 109 101 - 110 mmol/L    CO2 28 21 - 32 mmol/L    Anion Gap 3 2 - 11 mmol/L    Glucose 79 65 - 100 mg/dL    BUN 10 6 - 23 MG/DL    Creatinine 0.70 0.6 - 1.0 MG/DL    Est, Glom Filt Rate >60 >60 ml/min/1.73m2    Calcium 8.6 8.3 - 10.4 MG/DL   CBC with Auto Differential    Collection Time: 11/18/22  3:41 AM   Result Value Ref Range    WBC 14.2 (H) 4.3 - 11.1 K/uL    RBC 4.13 4.05 - 5.2 M/uL    Hemoglobin 11.9 11.7 - 15.4 g/dL    Hematocrit 36.6 35.8 - 46.3 %    MCV 88.6 82 - 102 FL    MCH 28.8 26.1 - 32.9 PG    MCHC 32.5 31.4 - 35.0 g/dL    RDW 14.2 11.9 - 14.6 %    Platelets 883 791 - 923 K/uL    MPV 9.3 (L) 9.4 - 12.3 FL    nRBC 0.00 0.0 - 0.2 K/uL    Differential Type AUTOMATED      Seg Neutrophils 77 43 - 78 %    Lymphocytes 10 (L) 13 - 44 %    Monocytes 12 4.0 - 12.0 %    Eosinophils % 1 0.5 - 7.8 %    Basophils 0 0.0 - 2.0 %    Immature Granulocytes 0 0.0 - 5.0 %    Segs Absolute 11.0 (H) 1.7 - 8.2 K/UL    Absolute Lymph # 1.4 0.5 - 4.6 K/UL    Absolute Mono # 1.7 (H) 0.1 - 1.3 K/UL    Absolute Eos # 0.1 0.0 - 0.8 K/UL    Basophils Absolute 0.0 0.0 - 0.2 K/UL    Absolute Immature Granulocyte 0.1 0.0 - 0.5 K/UL         Other Studies:  XR HAND RIGHT (MIN 3 VIEWS)    Result Date: 11/17/2022  Right HAND RADIOGRAPHS INDICATION: Indexed her pain and swelling COMPARISON: None available TECHNIQUE: 3 views of the right hand were obtained. FINDINGS: Severe soft tissue swelling throughout the index finger. No fracture, malalignment, or bone destruction evident. No soft tissue gas or radiopaque foreign body.      Severe soft tissue swelling of the index finger without acute underlying osteoarticular process evident by x-ray. No foreign body. XR CHEST PORTABLE    Result Date: 11/17/2022  CHEST X-RAY, 1 VIEW INDICATION: Sepsis COMPARISON: None available TECHNIQUE: Single AP view of the chest was obtained. FINDINGS: Lungs and pleural spaces: Normal. No pneumothorax or pleural effusion. Cardiomediastinal silhouette: Normal. Osseous structures: Normal.     No radiographic evidence of acute cardiopulmonary disease.        Current Meds:  Current Facility-Administered Medications   Medication Dose Route Frequency    lidocaine 1 % injection 5 mL  5 mL IntraDERmal Once    sodium chloride flush 0.9 % injection 5-40 mL  5-40 mL IntraVENous 2 times per day    sodium chloride flush 0.9 % injection 5-40 mL  5-40 mL IntraVENous PRN    0.9 % sodium chloride infusion   IntraVENous PRN    ondansetron (ZOFRAN-ODT) disintegrating tablet 4 mg  4 mg Oral Q8H PRN    Or    ondansetron (ZOFRAN) injection 4 mg  4 mg IntraVENous Q6H PRN    polyethylene glycol (GLYCOLAX) packet 17 g  17 g Oral Daily PRN    acetaminophen (TYLENOL) tablet 650 mg  650 mg Oral Q6H PRN    Or    acetaminophen (TYLENOL) suppository 650 mg  650 mg Rectal Q6H PRN    oxyCODONE (ROXICODONE) immediate release tablet 5 mg  5 mg Oral Q4H PRN    benztropine (COGENTIN) tablet 0.5 mg  0.5 mg Oral q8h    prazosin (MINIPRESS) capsule 2 mg  2 mg Oral Nightly    OLANZapine (ZYPREXA) tablet 7.5 mg  7.5 mg Oral Nightly    cefTRIAXone (ROCEPHIN) 1,000 mg in sodium chloride 0.9 % 50 mL IVPB mini-bag  1,000 mg IntraVENous Q24H    vancomycin (VANCOCIN) 750 mg in sodium chloride 0.9 % 250 mL IVPB ([id number])  750 mg IntraVENous Q12H    ibuprofen (ADVIL;MOTRIN) tablet 400 mg  400 mg Oral Q6H PRN     Current Outpatient Medications   Medication Sig    benztropine (COGENTIN) 0.5 MG tablet Take 0.5 mg by mouth 2 times daily    benztropine (COGENTIN) 1 MG tablet Take 1 mg by mouth 3 times daily as needed divalproex (DEPAKOTE) 500 MG DR tablet TAKE 2 TABLETS BY MOUTH AT BEDTIME    levETIRAcetam (KEPPRA) 500 MG tablet Take 500 mg by mouth 2 times daily    magnesium citrate (CITROMA) SOLN Take 296 mLs by mouth daily as needed    mirtazapine (REMERON) 15 MG tablet Take 15 mg by mouth    ondansetron (ZOFRAN-ODT) 4 MG disintegrating tablet Take 4 mg by mouth every 8 hours as needed    prazosin (MINIPRESS) 2 MG capsule TAKE 1 CAPSULE BY MOUTH AT BEDTIME    QUEtiapine (SEROQUEL) 100 MG tablet Take 100 mg by mouth    QUEtiapine (SEROQUEL) 200 MG tablet TAKE 2 TABLETS BY MOUTH AT BEDTIME    senna (SENOKOT) 8.6 MG tablet Take 1 tablet by mouth 2 times daily    traZODone (DESYREL) 50 MG tablet Take 50 mg by mouth       Signed:  Olya Thornton MD    Part of this note may have been written by using a voice dictation software. The note has been proof read but may still contain some grammatical/other typographical errors.

## 2022-11-18 NOTE — PROGRESS NOTES
VANCO DAILY FOLLOW UP NOTE  [address] [facility] Pharmacokinetic Monitoring Service - Vancomycin    Consulting Provider: Dr. Adonis Marie   Indication: SSTI  Target Concentration: Goal trough of 10-15 mg/L and AUC/ANDREA <500 mg*hr/L  Day of Therapy: 2  Additional Antimicrobials: ceftriaxone    Patient eligible for piperacillin-tazobactam to cefepime auto-substitution per P&T approved protocol? NO    Pertinent Laboratory Values: Wt Readings from Last 1 Encounters:   11/17/22 108 lb (49 kg)     Temp Readings from Last 1 Encounters:   11/18/22 98 °F (36.7 °C) (Oral)     Recent Labs     11/17/22  1105 11/18/22  0341   BUN 7 10   CREATININE 0.70 0.70   WBC 11.5* 14.2*   PROCAL <0.05  --      Estimated Creatinine Clearance: 83 mL/min (based on SCr of 0.7 mg/dL). No results found for: Rhett Whiteside    MRSA Nasal Swab: N/A. Non-respiratory infection.       Assessment:  Date/Time Dose Concentration AUC         Note: Serum concentrations collected for AUC dosing may appear elevated if collected in close proximity to the dose administered, this is not necessarily an indication of toxicity    Plan:  Continue 750 mg q12h  Repeat vancomycin concentrations will be ordered as clinically appropriate   Pharmacy will continue to monitor patient and adjust therapy as indicated    Thank you for the consult,  Reyes Hernandez, PharmD, BCOP  Clinical Pharmacist  Contact [employer]

## 2022-11-18 NOTE — CARE COORDINATION
11/18/22 1354   Service Assessment   Patient Orientation Alert and Oriented   Cognition Alert   History Provided By Patient   Primary Caregiver Self   Support Systems Other (Comment)   PCP Verified by CM Yes   Prior Functional Level Independent in ADLs/IADLs   Current Functional Level Independent in ADLs/IADLs   Can patient return to prior living arrangement Yes   Ability to make needs known: Good   Family able to assist with home care needs: No   Would you like for me to discuss the discharge plan with any other family members/significant others, and if so, who? No   Financial Resources Medicare   Social/Functional History   Lives With Alone   Receives Help From Other (comment)   ADL Assistance Independent   Homemaking Assistance Independent   Ambulation Assistance Independent   Transfer Assistance Independent   Active  No   Patient has a  through [employer] that his assisting her in getting a pcp. Her  is her support as she has no family support. Patient tells CM that she keeps to herself. No history of HH or rehab. If patient is discharged over the weekend she will need transport home. CM is not anticipating any other discharge needs. Patient

## 2022-11-19 PROBLEM — E43 SEVERE PROTEIN-CALORIE MALNUTRITION (GOMEZ: LESS THAN 60% OF STANDARD WEIGHT) (HCC): Status: ACTIVE | Noted: 2022-11-19

## 2022-11-19 LAB
BACTERIA SPEC CULT: ABNORMAL
BACTERIA SPEC CULT: ABNORMAL
ERYTHROCYTE [DISTWIDTH] IN BLOOD BY AUTOMATED COUNT: 14.2 % (ref 11.9–14.6)
GRAM STN SPEC: ABNORMAL
GRAM STN SPEC: ABNORMAL
HCT VFR BLD AUTO: 33.1 % (ref 35.8–46.3)
HGB BLD-MCNC: 10.5 G/DL (ref 11.7–15.4)
MCH RBC QN AUTO: 28.2 PG (ref 26.1–32.9)
MCHC RBC AUTO-ENTMCNC: 31.7 G/DL (ref 31.4–35)
MCV RBC AUTO: 88.7 FL (ref 82–102)
NRBC # BLD: 0 K/UL (ref 0–0.2)
PLATELET # BLD AUTO: 260 K/UL (ref 150–450)
PMV BLD AUTO: 9.6 FL (ref 9.4–12.3)
RBC # BLD AUTO: 3.73 M/UL (ref 4.05–5.2)
SERVICE CMNT-IMP: ABNORMAL
VANCOMYCIN SERPL-MCNC: 9.1 UG/ML
WBC # BLD AUTO: 10.7 K/UL (ref 4.3–11.1)

## 2022-11-19 PROCEDURE — 6370000000 HC RX 637 (ALT 250 FOR IP): Performed by: FAMILY MEDICINE

## 2022-11-19 PROCEDURE — 6360000002 HC RX W HCPCS: Performed by: HOSPITALIST

## 2022-11-19 PROCEDURE — 6370000000 HC RX 637 (ALT 250 FOR IP): Performed by: HOSPITALIST

## 2022-11-19 PROCEDURE — 6360000002 HC RX W HCPCS: Performed by: FAMILY MEDICINE

## 2022-11-19 PROCEDURE — 80202 ASSAY OF VANCOMYCIN: CPT

## 2022-11-19 PROCEDURE — 85027 COMPLETE CBC AUTOMATED: CPT

## 2022-11-19 PROCEDURE — 1100000000 HC RM PRIVATE

## 2022-11-19 PROCEDURE — 2580000003 HC RX 258: Performed by: FAMILY MEDICINE

## 2022-11-19 PROCEDURE — 36415 COLL VENOUS BLD VENIPUNCTURE: CPT

## 2022-11-19 RX ORDER — FLUCONAZOLE 100 MG/1
200 TABLET ORAL ONCE
Status: COMPLETED | OUTPATIENT
Start: 2022-11-19 | End: 2022-11-19

## 2022-11-19 RX ORDER — FLUCONAZOLE 100 MG/1
150 TABLET ORAL DAILY
Status: DISCONTINUED | OUTPATIENT
Start: 2022-11-20 | End: 2022-11-24 | Stop reason: HOSPADM

## 2022-11-19 RX ORDER — KETOROLAC TROMETHAMINE 15 MG/ML
15 INJECTION, SOLUTION INTRAMUSCULAR; INTRAVENOUS ONCE
Status: COMPLETED | OUTPATIENT
Start: 2022-11-19 | End: 2022-11-19

## 2022-11-19 RX ORDER — OXYCODONE HYDROCHLORIDE 5 MG/1
10 TABLET ORAL EVERY 4 HOURS PRN
Status: DISCONTINUED | OUTPATIENT
Start: 2022-11-19 | End: 2022-11-24 | Stop reason: HOSPADM

## 2022-11-19 RX ORDER — IBUPROFEN 400 MG/1
800 TABLET ORAL EVERY 8 HOURS PRN
Status: DISCONTINUED | OUTPATIENT
Start: 2022-11-19 | End: 2022-11-24 | Stop reason: HOSPADM

## 2022-11-19 RX ORDER — PANTOPRAZOLE SODIUM 40 MG/1
40 TABLET, DELAYED RELEASE ORAL
Status: DISCONTINUED | OUTPATIENT
Start: 2022-11-19 | End: 2022-11-24 | Stop reason: HOSPADM

## 2022-11-19 RX ORDER — HYDROMORPHONE HYDROCHLORIDE 1 MG/ML
1 INJECTION, SOLUTION INTRAMUSCULAR; INTRAVENOUS; SUBCUTANEOUS EVERY 4 HOURS PRN
Status: DISCONTINUED | OUTPATIENT
Start: 2022-11-19 | End: 2022-11-24 | Stop reason: HOSPADM

## 2022-11-19 RX ADMIN — ONDANSETRON 4 MG: 4 TABLET, ORALLY DISINTEGRATING ORAL at 12:53

## 2022-11-19 RX ADMIN — PRAZOSIN HYDROCHLORIDE 2 MG: 1 CAPSULE ORAL at 21:35

## 2022-11-19 RX ADMIN — IBUPROFEN 400 MG: 400 TABLET, FILM COATED ORAL at 12:36

## 2022-11-19 RX ADMIN — SODIUM CHLORIDE, PRESERVATIVE FREE 10 ML: 5 INJECTION INTRAVENOUS at 21:35

## 2022-11-19 RX ADMIN — OXYCODONE 5 MG: 5 TABLET ORAL at 09:47

## 2022-11-19 RX ADMIN — OXYCODONE 10 MG: 5 TABLET ORAL at 17:57

## 2022-11-19 RX ADMIN — BENZTROPINE MESYLATE 0.5 MG: 1 TABLET ORAL at 12:17

## 2022-11-19 RX ADMIN — FLUCONAZOLE 200 MG: 100 TABLET ORAL at 16:00

## 2022-11-19 RX ADMIN — HYDROMORPHONE HYDROCHLORIDE 1 MG: 1 INJECTION, SOLUTION INTRAMUSCULAR; INTRAVENOUS; SUBCUTANEOUS at 19:40

## 2022-11-19 RX ADMIN — ONDANSETRON 4 MG: 2 INJECTION INTRAMUSCULAR; INTRAVENOUS at 03:43

## 2022-11-19 RX ADMIN — CEFTRIAXONE 1000 MG: 1 INJECTION, POWDER, FOR SOLUTION INTRAMUSCULAR; INTRAVENOUS at 12:17

## 2022-11-19 RX ADMIN — VANCOMYCIN HYDROCHLORIDE 750 MG: 750 INJECTION, POWDER, LYOPHILIZED, FOR SOLUTION INTRAVENOUS at 03:48

## 2022-11-19 RX ADMIN — OLANZAPINE 7.5 MG: 5 TABLET, FILM COATED ORAL at 21:34

## 2022-11-19 RX ADMIN — ONDANSETRON 4 MG: 2 INJECTION INTRAMUSCULAR; INTRAVENOUS at 22:21

## 2022-11-19 RX ADMIN — HYDROMORPHONE HYDROCHLORIDE 1 MG: 1 INJECTION, SOLUTION INTRAMUSCULAR; INTRAVENOUS; SUBCUTANEOUS at 23:55

## 2022-11-19 RX ADMIN — IBUPROFEN 400 MG: 400 TABLET, FILM COATED ORAL at 05:29

## 2022-11-19 RX ADMIN — PANTOPRAZOLE SODIUM 40 MG: 40 TABLET, DELAYED RELEASE ORAL at 13:23

## 2022-11-19 RX ADMIN — BENZTROPINE MESYLATE 0.5 MG: 1 TABLET ORAL at 05:13

## 2022-11-19 RX ADMIN — SODIUM CHLORIDE, PRESERVATIVE FREE 10 ML: 5 INJECTION INTRAVENOUS at 12:19

## 2022-11-19 RX ADMIN — SODIUM CHLORIDE, PRESERVATIVE FREE 10 ML: 5 INJECTION INTRAVENOUS at 13:24

## 2022-11-19 RX ADMIN — OXYCODONE 5 MG: 5 TABLET ORAL at 03:41

## 2022-11-19 RX ADMIN — BENZTROPINE MESYLATE 0.5 MG: 1 TABLET ORAL at 21:35

## 2022-11-19 RX ADMIN — KETOROLAC TROMETHAMINE 15 MG: 15 INJECTION, SOLUTION INTRAMUSCULAR; INTRAVENOUS at 13:22

## 2022-11-19 RX ADMIN — OXYCODONE 10 MG: 5 TABLET ORAL at 22:15

## 2022-11-19 RX ADMIN — VANCOMYCIN HYDROCHLORIDE 750 MG: 750 INJECTION, POWDER, LYOPHILIZED, FOR SOLUTION INTRAVENOUS at 18:00

## 2022-11-19 RX ADMIN — SODIUM CHLORIDE, PRESERVATIVE FREE 10 ML: 5 INJECTION INTRAVENOUS at 10:15

## 2022-11-19 ASSESSMENT — PAIN DESCRIPTION - ORIENTATION
ORIENTATION: RIGHT

## 2022-11-19 ASSESSMENT — PAIN DESCRIPTION - LOCATION
LOCATION: FINGER (COMMENT WHICH ONE)
LOCATION: FINGER (COMMENT WHICH ONE);HAND
LOCATION: FINGER (COMMENT WHICH ONE)
LOCATION: HAND;FINGER (COMMENT WHICH ONE)

## 2022-11-19 ASSESSMENT — PAIN SCALES - GENERAL
PAINLEVEL_OUTOF10: 3
PAINLEVEL_OUTOF10: 7
PAINLEVEL_OUTOF10: 8
PAINLEVEL_OUTOF10: 6
PAINLEVEL_OUTOF10: 0
PAINLEVEL_OUTOF10: 6
PAINLEVEL_OUTOF10: 0
PAINLEVEL_OUTOF10: 0
PAINLEVEL_OUTOF10: 6
PAINLEVEL_OUTOF10: 3
PAINLEVEL_OUTOF10: 9
PAINLEVEL_OUTOF10: 0
PAINLEVEL_OUTOF10: 8
PAINLEVEL_OUTOF10: 7
PAINLEVEL_OUTOF10: 0

## 2022-11-19 ASSESSMENT — PAIN DESCRIPTION - DESCRIPTORS
DESCRIPTORS: ACHING
DESCRIPTORS: THROBBING
DESCRIPTORS: ACHING
DESCRIPTORS: ACHING
DESCRIPTORS: THROBBING
DESCRIPTORS: ACHING
DESCRIPTORS: ACHING
DESCRIPTORS: STABBING
DESCRIPTORS: SHOOTING

## 2022-11-19 NOTE — PROGRESS NOTES
Roxicodone 5 mg po given for right hand index finger pain with pain level at 6-7 and will assess med with call light in reach

## 2022-11-19 NOTE — PROGRESS NOTES
Motrin 400 mg po given for right hand index finger pain with pain level at 3 and will assess med with call light in reach

## 2022-11-19 NOTE — FLOWSHEET NOTE
Requests Motrin for finger pain. Motrin 400 mg po given.        11/19/22 1230   Pain Assessment   Pain Level 8   Patient's Stated Pain Goal 0 - No pain   Pain Location Finger (Comment which one)   Pain Orientation Right   Pain Descriptors Aching

## 2022-11-19 NOTE — PROGRESS NOTES
Hospitalist Progress Note   Admit Date:  2022 11:07 AM   Name:  Cristy Gusman   Age:  38 y.o. Sex:  female  :  1980   MRN:  [id number]   Room:  4/    Reason(s) for Admission: Finger infection [L08.9]  Finger wound, simple, open, initial encounter [facility] Course & Interval History:   Ms. Danuta Zavala is a 37 y/o female with a h/o polysubstance abuse and PTSD who was admitted to our service on  with cellulitis of the R index finger. No recent trauma or injury to the area. Labs unremarkable on admission. The area was lanced by the ER team with purulent material noted. X-ray showed soft tissue swelling without foreign body or other concerning findings. She was started on vancomycin and ceftriaxone. Orthopedic Surgery was consulted. Cultures are pending. Subjective/24hr Events (22): Patient seen at bedside, resting in bed comfortably. Reports of severe pain and right index finger. Denies any chest pain, palpitations, petechiae or skin rash, nausea vomiting or diarrhea. Patient is requesting for fluconazole saying she is sensitive to antibiotics and usually lead to yeast infection. Patient febrile with T-max of 100.8    ROS:  10 point review of system is negative except for what mentioned above    Assessment & Plan:   # Cellulitis of R index finger   -:  Status post I&D in ER, wound culture positive for heavy MRSA. Case discussed with Ortho, plan to continue IV antibiotics over the weekend and Dr. Dariana Sawyer from Ortho will evaluate patient on Monday,  for debridement. Pain control with oxycodone and Dilaudid. Started on fluconazole to treat yeast infection. Blood cultures negative to date. #Severe protein calorie malnutrition  :  Ordered for high calorie and protein supplement with each meal.  BMI 16.9. # PTSD   - Con't home medications    # H/o polysubstance abuse     Discharge Planning: Home when able. Diet:  ADULT DIET;  Regular  DVT rhonchi, or rales. Respirations even, unlabored. Abdomen: Bowel sounds present. Soft, nontender, nondistended. Extremities: No cyanosis or clubbing. The R index finger is swollen from the knuckle to the DIP joint, there is erythema and purulent drainage from the dorsum of the finger and the anterior aspect, it is tender to palpation. Picture in media section of chart. Skin:     No rashes and normal coloration. Warm and dry.     Neuro:  GCS 15, cranial nerves intact, no motor or sensory deficit  Psych:  AOx3, mood and affect appropriate    I have reviewed ordered lab tests and independently visualized imaging below:    Recent Labs:  Recent Results (from the past 48 hour(s))   EKG 12 Lead    Collection Time: 11/17/22 11:04 AM   Result Value Ref Range    Ventricular Rate 89 BPM    Atrial Rate 89 BPM    P-R Interval 140 ms    QRS Duration 92 ms    Q-T Interval 356 ms    QTc Calculation (Bazett) 433 ms    P Axis 76 degrees    R Axis 75 degrees    T Axis 70 degrees    Diagnosis Normal sinus rhythm    Culture, Blood 1    Collection Time: 11/17/22 11:05 AM    Specimen: Blood   Result Value Ref Range    Special Requests LEFT  Antecubital        Culture NO GROWTH 2 DAYS     Lactate, Sepsis    Collection Time: 11/17/22 11:05 AM   Result Value Ref Range    Lactic Acid, Sepsis 1.8 0.4 - 2.0 MMOL/L   CBC with Auto Differential    Collection Time: 11/17/22 11:05 AM   Result Value Ref Range    WBC 11.5 (H) 4.3 - 11.1 K/uL    RBC 4.28 4.05 - 5.2 M/uL    Hemoglobin 12.1 11.7 - 15.4 g/dL    Hematocrit 38.2 35.8 - 46.3 %    MCV 89.3 82 - 102 FL    MCH 28.3 26.1 - 32.9 PG    MCHC 31.7 31.4 - 35.0 g/dL    RDW 14.0 11.9 - 14.6 %    Platelets 836 087 - 547 K/uL    MPV 9.0 (L) 9.4 - 12.3 FL    nRBC 0.00 0.0 - 0.2 K/uL    Differential Type AUTOMATED      Seg Neutrophils 81 (H) 43 - 78 %    Lymphocytes 9 (L) 13 - 44 %    Monocytes 8 4.0 - 12.0 %    Eosinophils % 1 0.5 - 7.8 %    Basophils 0 0.0 - 2.0 %    Immature Granulocytes 1 0.0 - 5.0 %    Segs Absolute 9.4 (H) 1.7 - 8.2 K/UL    Absolute Lymph # 1.0 0.5 - 4.6 K/UL    Absolute Mono # 0.9 0.1 - 1.3 K/UL    Absolute Eos # 0.1 0.0 - 0.8 K/UL    Basophils Absolute 0.0 0.0 - 0.2 K/UL    Absolute Immature Granulocyte 0.1 0.0 - 0.5 K/UL   CMP    Collection Time: 11/17/22 11:05 AM   Result Value Ref Range    Sodium 137 133 - 143 mmol/L    Potassium 3.8 3.5 - 5.1 mmol/L    Chloride 106 101 - 110 mmol/L    CO2 30 21 - 32 mmol/L    Anion Gap 1 (L) 2 - 11 mmol/L    Glucose 140 (H) 65 - 100 mg/dL    BUN 7 6 - 23 MG/DL    Creatinine 0.70 0.6 - 1.0 MG/DL    Est, Glom Filt Rate >60 >60 ml/min/1.73m2    Calcium 8.9 8.3 - 10.4 MG/DL    Total Bilirubin 0.3 0.2 - 1.1 MG/DL    ALT 41 12 - 65 U/L    AST 18 15 - 37 U/L    Alk Phosphatase 83 50 - 136 U/L    Total Protein 7.4 6.3 - 8.2 g/dL    Albumin 3.4 (L) 3.5 - 5.0 g/dL    Globulin 4.0 2.8 - 4.5 g/dL    Albumin/Globulin Ratio 0.9 0.4 - 1.6     Procalcitonin    Collection Time: 11/17/22 11:05 AM   Result Value Ref Range    Procalcitonin <0.05 0.00 - 0.49 ng/mL   Culture, Blood 2    Collection Time: 11/17/22 11:18 AM    Specimen: Blood   Result Value Ref Range    Special Requests LEFT  HAND        Culture NO GROWTH 2 DAYS     Culture, Wound Aerobic Only    Collection Time: 11/17/22  2:40 PM    Specimen: Abscess    FINGER   Result Value Ref Range    Special Requests NO SPECIAL REQUESTS      Gram stain 5 TO 20 WBCS SEEN PER OIF     Gram stain MANY GRAM POSITIVE COCCI      Culture HEAVY STAPHYLOCOCCUS AUREUS SENSITIVITY TO FOLLOW (A)     Culture, Anaerobic    Collection Time: 11/17/22  2:40 PM    Specimen: Abscess    FINGER   Result Value Ref Range    Special Requests NO SPECIAL REQUESTS      Culture        SUBCULTURE IS NECESSARY TO DETERMINE PRESENCE OR ABSENCE OF ANAEROBIC BACTERIA IN THIS CULTURE. FURTHER REPORT TO FOLLOW AFTER INCUBATION OF SUBCULTURE.    Basic Metabolic Panel w/ Reflex to MG    Collection Time: 11/18/22  3:41 AM   Result Value Ref Range    Sodium 140 133 - 143 mmol/L    Potassium 3.8 3.5 - 5.1 mmol/L    Chloride 109 101 - 110 mmol/L    CO2 28 21 - 32 mmol/L    Anion Gap 3 2 - 11 mmol/L    Glucose 79 65 - 100 mg/dL    BUN 10 6 - 23 MG/DL    Creatinine 0.70 0.6 - 1.0 MG/DL    Est, Glom Filt Rate >60 >60 ml/min/1.73m2    Calcium 8.6 8.3 - 10.4 MG/DL   CBC with Auto Differential    Collection Time: 11/18/22  3:41 AM   Result Value Ref Range    WBC 14.2 (H) 4.3 - 11.1 K/uL    RBC 4.13 4.05 - 5.2 M/uL    Hemoglobin 11.9 11.7 - 15.4 g/dL    Hematocrit 36.6 35.8 - 46.3 %    MCV 88.6 82 - 102 FL    MCH 28.8 26.1 - 32.9 PG    MCHC 32.5 31.4 - 35.0 g/dL    RDW 14.2 11.9 - 14.6 %    Platelets 630 145 - 702 K/uL    MPV 9.3 (L) 9.4 - 12.3 FL    nRBC 0.00 0.0 - 0.2 K/uL    Differential Type AUTOMATED      Seg Neutrophils 77 43 - 78 %    Lymphocytes 10 (L) 13 - 44 %    Monocytes 12 4.0 - 12.0 %    Eosinophils % 1 0.5 - 7.8 %    Basophils 0 0.0 - 2.0 %    Immature Granulocytes 0 0.0 - 5.0 %    Segs Absolute 11.0 (H) 1.7 - 8.2 K/UL    Absolute Lymph # 1.4 0.5 - 4.6 K/UL    Absolute Mono # 1.7 (H) 0.1 - 1.3 K/UL    Absolute Eos # 0.1 0.0 - 0.8 K/UL    Basophils Absolute 0.0 0.0 - 0.2 K/UL    Absolute Immature Granulocyte 0.1 0.0 - 0.5 K/UL   Drug Screen Psych, Urine    Collection Time: 11/18/22  9:28 AM   Result Value Ref Range    Benzodiazepines, Urine Negative NEG      Opiates, Urine Positive (A) NEG      Amphetamine, Urine Negative NEG      Cocaine, Urine Negative NEG      THC, TH-Cannabinol, Urine Negative NEG     Vancomycin Level, Random    Collection Time: 11/19/22 12:01 AM   Result Value Ref Range    Vancomycin Rm 9.1 UG/ML   CBC    Collection Time: 11/19/22  3:23 AM   Result Value Ref Range    WBC 10.7 4.3 - 11.1 K/uL    RBC 3.73 (L) 4.05 - 5.2 M/uL    Hemoglobin 10.5 (L) 11.7 - 15.4 g/dL    Hematocrit 33.1 (L) 35.8 - 46.3 %    MCV 88.7 82 - 102 FL    MCH 28.2 26.1 - 32.9 PG    MCHC 31.7 31.4 - 35.0 g/dL    RDW 14.2 11.9 - 14.6 %    Platelets 902 993 - 220 K/uL    MPV 9.6 9.4 - 12.3 FL    nRBC 0.00 0.0 - 0.2 K/uL         Other Studies:  XR HAND RIGHT (MIN 3 VIEWS)    Result Date: 11/17/2022  Right HAND RADIOGRAPHS INDICATION: Indexed her pain and swelling COMPARISON: None available TECHNIQUE: 3 views of the right hand were obtained. FINDINGS: Severe soft tissue swelling throughout the index finger. No fracture, malalignment, or bone destruction evident. No soft tissue gas or radiopaque foreign body. Severe soft tissue swelling of the index finger without acute underlying osteoarticular process evident by x-ray. No foreign body. XR CHEST PORTABLE    Result Date: 11/17/2022  CHEST X-RAY, 1 VIEW INDICATION: Sepsis COMPARISON: None available TECHNIQUE: Single AP view of the chest was obtained. FINDINGS: Lungs and pleural spaces: Normal. No pneumothorax or pleural effusion. Cardiomediastinal silhouette: Normal. Osseous structures: Normal.     No radiographic evidence of acute cardiopulmonary disease.        Current Meds:  Current Facility-Administered Medications   Medication Dose Route Frequency    lidocaine 1 % injection 5 mL  5 mL IntraDERmal Once    sodium chloride flush 0.9 % injection 5-40 mL  5-40 mL IntraVENous 2 times per day    sodium chloride flush 0.9 % injection 5-40 mL  5-40 mL IntraVENous PRN    0.9 % sodium chloride infusion   IntraVENous PRN    ondansetron (ZOFRAN-ODT) disintegrating tablet 4 mg  4 mg Oral Q8H PRN    Or    ondansetron (ZOFRAN) injection 4 mg  4 mg IntraVENous Q6H PRN    polyethylene glycol (GLYCOLAX) packet 17 g  17 g Oral Daily PRN    acetaminophen (TYLENOL) tablet 650 mg  650 mg Oral Q6H PRN    Or    acetaminophen (TYLENOL) suppository 650 mg  650 mg Rectal Q6H PRN    oxyCODONE (ROXICODONE) immediate release tablet 5 mg  5 mg Oral Q4H PRN    benztropine (COGENTIN) tablet 0.5 mg  0.5 mg Oral q8h    prazosin (MINIPRESS) capsule 2 mg  2 mg Oral Nightly    OLANZapine (ZYPREXA) tablet 7.5 mg  7.5 mg Oral Nightly    cefTRIAXone (ROCEPHIN) 1,000 mg in sodium chloride 0.9 % 50 mL IVPB mini-bag  1,000 mg IntraVENous Q24H    vancomycin (VANCOCIN) 750 mg in sodium chloride 0.9 % 250 mL IVPB ([id number])  750 mg IntraVENous Q12H    ibuprofen (ADVIL;MOTRIN) tablet 400 mg  400 mg Oral Q6H PRN       Signed:  Army Saint, MD    Part of this note may have been written by using a voice dictation software. The note has been proof read but may still contain some grammatical/other typographical errors.

## 2022-11-19 NOTE — PROGRESS NOTES
Motrin 800 mg po given for pain to right hand index finger and temp of 100.8 and will assess med with call light in reach

## 2022-11-19 NOTE — PROGRESS NOTES
Patient up in room and no pain or distress noted at this time. RA with RR even/unlabored. Patient right hand dressing intact, clean and dry.   Safety in place with call light in reach

## 2022-11-19 NOTE — PROGRESS NOTES
Pain relieved and patient resting in bed with no distress noted.   Call light in reach and will prepare bedside shift report for oncoming nurse

## 2022-11-19 NOTE — PROGRESS NOTES
VANCO DAILY FOLLOW UP NOTE  [facility] Pharmacokinetic Monitoring Service - Vancomycin    Consulting Provider: Dr. Massimo Taylor   Indication: SSTI  Target Concentration: Goal trough of 10-15 mg/L and AUC/ANDREA <500 mg*hr/L  Day of Therapy: 2  Additional Antimicrobials: ceftriaxone    Patient eligible for piperacillin-tazobactam to cefepime auto-substitution per P&T approved protocol? NO    Pertinent Laboratory Values: Wt Readings from Last 1 Encounters:   11/17/22 108 lb (49 kg)     Temp Readings from Last 1 Encounters:   11/19/22 97.9 °F (36.6 °C) (Oral)     Recent Labs     11/17/22  1105 11/18/22  0341 11/19/22  0323   BUN 7 10  --    CREATININE 0.70 0.70  --    WBC 11.5* 14.2* 10.7   PROCAL <0.05  --   --      Estimated Creatinine Clearance: 83 mL/min (based on SCr of 0.7 mg/dL). Lab Results   Component Value Date/Time    VANCORANDOM 9.1 11/19/2022 12:01 AM       MRSA Nasal Swab: N/A. Non-respiratory infection.       Assessment:  Date/Time Dose Concentration AUC   11/19 0001 750 mg q12h 9.1 385   Note: Serum concentrations collected for AUC dosing may appear elevated if collected in close proximity to the dose administered, this is not necessarily an indication of toxicity    Plan:  Current dosing regimen is sub-therapeutic  Increase dose to 1000 mg q12h for predicted AUC/Tr of 511/14.3  Repeat vancomycin concentrations will be ordered as clinically appropriate   Pharmacy will continue to monitor patient and adjust therapy as indicated    Thank you for the consult,  Alfonso Edouard, [facility]

## 2022-11-20 PROCEDURE — 2580000003 HC RX 258: Performed by: FAMILY MEDICINE

## 2022-11-20 PROCEDURE — 2580000003 HC RX 258: Performed by: HOSPITALIST

## 2022-11-20 PROCEDURE — 6360000002 HC RX W HCPCS: Performed by: HOSPITALIST

## 2022-11-20 PROCEDURE — 1100000000 HC RM PRIVATE

## 2022-11-20 PROCEDURE — 6360000002 HC RX W HCPCS: Performed by: FAMILY MEDICINE

## 2022-11-20 PROCEDURE — 6370000000 HC RX 637 (ALT 250 FOR IP): Performed by: FAMILY MEDICINE

## 2022-11-20 PROCEDURE — 6370000000 HC RX 637 (ALT 250 FOR IP): Performed by: HOSPITALIST

## 2022-11-20 RX ADMIN — ONDANSETRON 4 MG: 4 TABLET, ORALLY DISINTEGRATING ORAL at 17:48

## 2022-11-20 RX ADMIN — ACETAMINOPHEN 650 MG: 325 TABLET, FILM COATED ORAL at 20:20

## 2022-11-20 RX ADMIN — IBUPROFEN 800 MG: 400 TABLET, FILM COATED ORAL at 17:47

## 2022-11-20 RX ADMIN — SODIUM CHLORIDE, PRESERVATIVE FREE 10 ML: 5 INJECTION INTRAVENOUS at 20:20

## 2022-11-20 RX ADMIN — ONDANSETRON 4 MG: 2 INJECTION INTRAMUSCULAR; INTRAVENOUS at 05:07

## 2022-11-20 RX ADMIN — BENZTROPINE MESYLATE 0.5 MG: 1 TABLET ORAL at 04:34

## 2022-11-20 RX ADMIN — OXYCODONE 10 MG: 5 TABLET ORAL at 11:43

## 2022-11-20 RX ADMIN — VANCOMYCIN HYDROCHLORIDE 1000 MG: 1 INJECTION, POWDER, LYOPHILIZED, FOR SOLUTION INTRAVENOUS at 17:48

## 2022-11-20 RX ADMIN — PRAZOSIN HYDROCHLORIDE 2 MG: 1 CAPSULE ORAL at 22:38

## 2022-11-20 RX ADMIN — FLUCONAZOLE 150 MG: 100 TABLET ORAL at 08:04

## 2022-11-20 RX ADMIN — VANCOMYCIN HYDROCHLORIDE 750 MG: 750 INJECTION, POWDER, LYOPHILIZED, FOR SOLUTION INTRAVENOUS at 04:34

## 2022-11-20 RX ADMIN — BENZTROPINE MESYLATE 0.5 MG: 1 TABLET ORAL at 14:01

## 2022-11-20 RX ADMIN — OXYCODONE 10 MG: 5 TABLET ORAL at 17:47

## 2022-11-20 RX ADMIN — HYDROMORPHONE HYDROCHLORIDE 1 MG: 1 INJECTION, SOLUTION INTRAMUSCULAR; INTRAVENOUS; SUBCUTANEOUS at 08:01

## 2022-11-20 RX ADMIN — HYDROMORPHONE HYDROCHLORIDE 1 MG: 1 INJECTION, SOLUTION INTRAMUSCULAR; INTRAVENOUS; SUBCUTANEOUS at 20:20

## 2022-11-20 RX ADMIN — CEFTRIAXONE 1000 MG: 1 INJECTION, POWDER, FOR SOLUTION INTRAMUSCULAR; INTRAVENOUS at 11:42

## 2022-11-20 RX ADMIN — OXYCODONE 10 MG: 5 TABLET ORAL at 04:56

## 2022-11-20 RX ADMIN — SODIUM CHLORIDE, PRESERVATIVE FREE 10 ML: 5 INJECTION INTRAVENOUS at 08:03

## 2022-11-20 RX ADMIN — OLANZAPINE 7.5 MG: 5 TABLET, FILM COATED ORAL at 22:38

## 2022-11-20 RX ADMIN — HYDROMORPHONE HYDROCHLORIDE 1 MG: 1 INJECTION, SOLUTION INTRAMUSCULAR; INTRAVENOUS; SUBCUTANEOUS at 14:09

## 2022-11-20 RX ADMIN — BENZTROPINE MESYLATE 0.5 MG: 1 TABLET ORAL at 22:37

## 2022-11-20 RX ADMIN — OXYCODONE 10 MG: 5 TABLET ORAL at 22:37

## 2022-11-20 RX ADMIN — PANTOPRAZOLE SODIUM 40 MG: 40 TABLET, DELAYED RELEASE ORAL at 05:07

## 2022-11-20 ASSESSMENT — PAIN SCALES - GENERAL
PAINLEVEL_OUTOF10: 6
PAINLEVEL_OUTOF10: 6
PAINLEVEL_OUTOF10: 8
PAINLEVEL_OUTOF10: 9
PAINLEVEL_OUTOF10: 0
PAINLEVEL_OUTOF10: 6
PAINLEVEL_OUTOF10: 7
PAINLEVEL_OUTOF10: 6
PAINLEVEL_OUTOF10: 8
PAINLEVEL_OUTOF10: 9
PAINLEVEL_OUTOF10: 7

## 2022-11-20 ASSESSMENT — PAIN DESCRIPTION - ORIENTATION
ORIENTATION: RIGHT

## 2022-11-20 ASSESSMENT — PAIN DESCRIPTION - LOCATION
LOCATION: FINGER (COMMENT WHICH ONE)

## 2022-11-20 ASSESSMENT — PAIN DESCRIPTION - DESCRIPTORS
DESCRIPTORS: ACHING
DESCRIPTORS: ACHING
DESCRIPTORS: ACHING;POUNDING
DESCRIPTORS: THROBBING;ACHING
DESCRIPTORS: ACHING
DESCRIPTORS: THROBBING;ACHING
DESCRIPTORS: ACHING
DESCRIPTORS: THROBBING

## 2022-11-20 NOTE — PROGRESS NOTES
Patient reports 7/10 pain in R finger. Gave 10 mg Roxicodone po. Patient reports nausea.     Gave 4 mg Zofran iv

## 2022-11-20 NOTE — PROGRESS NOTES
Patient resting in bed on room air. Respirations present and unlabored. A&Ox4. Hand dressing C/D/I. No needs at this time. Call bell within reach and encouraged to call with needs.

## 2022-11-20 NOTE — PROGRESS NOTES
Hospitalist Progress Note   Admit Date:  2022 11:07 AM   Name:  Nicki Barker   Age:  38 y.o. Sex:  female  :  1980   MRN:  [id number]   Room:  [facility]/    Reason(s) for Admission: Finger infection [L08.9]  Finger wound, simple, open, initial encounter [facility] Course & Interval History:   Ms. [facility] [place] is a 37 y/o female with a h/o polysubstance abuse and PTSD who was admitted to our service on  with cellulitis of the R index finger. No recent trauma or injury to the area. Labs unremarkable on admission. The area was lanced by the ER team with purulent material noted. X-ray showed soft tissue swelling without foreign body or other concerning findings. She was started on vancomycin and ceftriaxone. Orthopedic Surgery was consulted. Cultures are pending. Subjective/24hr Events (22): Patient is resting comfortably in bed, reports much better control in right index finger pain. No fever over past 24 hours. No nausea, vomiting, chest pain, palpitations. No other overnight events reported by nursing staff. ROS:  10 point review of system is negative except for what mentioned above    Assessment & Plan:   # Cellulitis of R index finger   -:  Status post I&D in ER, wound culture positive for heavy MRSA. Case discussed with Ortho, plan to continue IV antibiotics over the weekend and Dr. Ilene John from Ortho will evaluate patient on Monday,  for debridement. Pain control with oxycodone and Dilaudid. Started on fluconazole to treat yeast infection. Blood cultures negative to date. #Severe protein calorie malnutrition  :  Ordered for high calorie and protein supplement with each meal.  BMI 16.9. # PTSD   - Con't home medications    # H/o polysubstance abuse     Discharge Planning: Home when able. Diet:  ADULT DIET;  Regular  ADULT ORAL NUTRITION SUPPLEMENT; Breakfast, Lunch, Dinner; Standard High Calorie/High Protein Oral Supplement  DVT PPx: Code status: Full Code    I spent 36 minutes of time caring for this patient on the unit nearby or at bedside, and more than 50 percent was spent on coordination of care activities, and/or patient/family counseling regarding status and plan of care. Hospital Problems             Last Modified POA    * (Principal) Finger wound, simple, open, initial encounter 11/17/2022 Yes    Severe protein-calorie malnutrition Teola Alyssa: less than 60% of standard weight) ([address] 11/19/2022 Yes    Chronic post-traumatic stress disorder (PTSD) 11/17/2022 Yes    Stimulant use disorder 11/17/2022 Yes     Objective:   Patient Vitals for the past 24 hrs:   Temp Pulse Resp BP SpO2   11/20/22 0526 -- -- 17 -- --   11/20/22 0456 -- -- 17 -- --   11/20/22 0243 98.8 °F (37.1 °C) 91 18 105/71 96 %   11/20/22 0025 -- -- 18 -- --   11/20/22 0022 99.3 °F (37.4 °C) 100 18 (!) 102/54 96 %   11/19/22 2355 -- -- 17 -- --   11/19/22 2245 -- -- 18 -- --   11/19/22 2215 -- -- 18 -- --   11/19/22 2135 -- -- -- 111/71 --   11/19/22 2010 -- -- 17 -- --   11/19/22 1918 98.6 °F (37 °C) 95 18 117/71 100 %   11/19/22 1757 -- -- 20 -- --   11/19/22 1610 98 °F (36.7 °C) 84 18 106/66 99 %   11/19/22 1121 97.9 °F (36.6 °C) 88 18 114/75 100 %         Estimated body mass index is 16.92 kg/m² as calculated from the following:    Height as of this encounter: 5' 7\" (1.702 m). Weight as of this encounter: 108 lb (49 kg). Intake/Output Summary (Last 24 hours) at 11/20/2022 0730  Last data filed at 11/19/2022 2135  Gross per 24 hour   Intake 10 ml   Output --   Net 10 ml           Physical Exam:   Blood pressure 105/71, pulse 91, temperature 98.8 °F (37.1 °C), resp. rate 17, height 5' 7\" (1.702 m), weight 108 lb (49 kg), SpO2 96 %. General:    Alert, awake, NAD  Head:  Normocephalic, atraumatic  Eyes:  Sclerae appear normal. Pupils equally round. ENT:  Nares appear normal, no drainage. Moist oral mucosa  Neck:  No restricted ROM. Trachea midline. CV:   RRR.   No m/r/g. No jugular venous distension. Lungs:   CTAB. No wheezing, rhonchi, or rales. Respirations even, unlabored. Abdomen: Bowel sounds present. Soft, nontender, nondistended. Extremities: No cyanosis or clubbing. The R index finger is swollen from the knuckle to the DIP joint, there is erythema and purulent drainage from the dorsum of the finger and the anterior aspect, it is tender to palpation. Picture in media section of chart. Skin:     No rashes and normal coloration. Warm and dry. Neuro:  GCS 15, cranial nerves intact, no motor or sensory deficit  Psych:  AOx3, mood and affect appropriate    I have reviewed ordered lab tests and independently visualized imaging below:    Recent Labs:  Recent Results (from the past 48 hour(s))   Drug Screen Psych, Urine    Collection Time: 11/18/22  9:28 AM   Result Value Ref Range    Benzodiazepines, Urine Negative NEG      Opiates, Urine Positive (A) NEG      Amphetamine, Urine Negative NEG      Cocaine, Urine Negative NEG      THC, TH-Cannabinol, Urine Negative NEG     Vancomycin Level, Random    Collection Time: 11/19/22 12:01 AM   Result Value Ref Range    Vancomycin Rm 9.1 UG/ML   CBC    Collection Time: 11/19/22  3:23 AM   Result Value Ref Range    WBC 10.7 4.3 - 11.1 K/uL    RBC 3.73 (L) 4.05 - 5.2 M/uL    Hemoglobin 10.5 (L) 11.7 - 15.4 g/dL    Hematocrit 33.1 (L) 35.8 - 46.3 %    MCV 88.7 82 - 102 FL    MCH 28.2 26.1 - 32.9 PG    MCHC 31.7 31.4 - 35.0 g/dL    RDW 14.2 11.9 - 14.6 %    Platelets 310 647 - 689 K/uL    MPV 9.6 9.4 - 12.3 FL    nRBC 0.00 0.0 - 0.2 K/uL         Other Studies:  XR HAND RIGHT (MIN 3 VIEWS)    Result Date: 11/17/2022  Right HAND RADIOGRAPHS INDICATION: Indexed her pain and swelling COMPARISON: None available TECHNIQUE: 3 views of the right hand were obtained. FINDINGS: Severe soft tissue swelling throughout the index finger. No fracture, malalignment, or bone destruction evident. No soft tissue gas or radiopaque foreign body. Severe soft tissue swelling of the index finger without acute underlying osteoarticular process evident by x-ray. No foreign body. XR CHEST PORTABLE    Result Date: 11/17/2022  CHEST X-RAY, 1 VIEW INDICATION: Sepsis COMPARISON: None available TECHNIQUE: Single AP view of the chest was obtained. FINDINGS: Lungs and pleural spaces: Normal. No pneumothorax or pleural effusion. Cardiomediastinal silhouette: Normal. Osseous structures: Normal.     No radiographic evidence of acute cardiopulmonary disease.        Current Meds:  Current Facility-Administered Medications   Medication Dose Route Frequency    oxyCODONE (ROXICODONE) immediate release tablet 10 mg  10 mg Oral Q4H PRN    ibuprofen (ADVIL;MOTRIN) tablet 800 mg  800 mg Oral Q8H PRN    HYDROmorphone HCl PF (DILAUDID) injection 1 mg  1 mg IntraVENous Q4H PRN    pantoprazole (PROTONIX) tablet 40 mg  40 mg Oral QAM AC    fluconazole (DIFLUCAN) tablet 150 mg  150 mg Oral Daily    lidocaine 1 % injection 5 mL  5 mL IntraDERmal Once    sodium chloride flush 0.9 % injection 5-40 mL  5-40 mL IntraVENous 2 times per day    sodium chloride flush 0.9 % injection 5-40 mL  5-40 mL IntraVENous PRN    0.9 % sodium chloride infusion   IntraVENous PRN    ondansetron (ZOFRAN-ODT) disintegrating tablet 4 mg  4 mg Oral Q8H PRN    Or    ondansetron (ZOFRAN) injection 4 mg  4 mg IntraVENous Q6H PRN    polyethylene glycol (GLYCOLAX) packet 17 g  17 g Oral Daily PRN    acetaminophen (TYLENOL) tablet 650 mg  650 mg Oral Q6H PRN    Or    acetaminophen (TYLENOL) suppository 650 mg  650 mg Rectal Q6H PRN    benztropine (COGENTIN) tablet 0.5 mg  0.5 mg Oral q8h    prazosin (MINIPRESS) capsule 2 mg  2 mg Oral Nightly    OLANZapine (ZYPREXA) tablet 7.5 mg  7.5 mg Oral Nightly    cefTRIAXone (ROCEPHIN) 1,000 mg in sodium chloride 0.9 % 50 mL IVPB mini-bag  1,000 mg IntraVENous Q24H    vancomycin (VANCOCIN) 750 mg in sodium chloride 0.9 % 250 mL IVPB ([id number])  750 mg IntraVENous Q12H Signed:  Ely Newton MD    Part of this note may have been written by using a voice dictation software. The note has been proof read but may still contain some grammatical/other typographical errors.

## 2022-11-20 NOTE — PROGRESS NOTES
VANCO DAILY FOLLOW UP NOTE  [facility] Pharmacokinetic Monitoring Service - Vancomycin    Consulting Provider: Dr. Amaya Wang   Indication: SSTI  Target Concentration: Goal trough of 10-15 mg/L and AUC/ANDREA <500 mg*hr/L  Day of Therapy: 3  Additional Antimicrobials: ceftriaxone    Patient eligible for piperacillin-tazobactam to cefepime auto-substitution per P&T approved protocol? NO    Pertinent Laboratory Values: Wt Readings from Last 1 Encounters:   11/17/22 108 lb (49 kg)     Temp Readings from Last 1 Encounters:   11/20/22 98.2 °F (36.8 °C) (Axillary)     Recent Labs     11/18/22  0341 11/19/22  0323   BUN 10  --    CREATININE 0.70  --    WBC 14.2* 10.7     Estimated Creatinine Clearance: 83 mL/min (based on SCr of 0.7 mg/dL). Lab Results   Component Value Date/Time    VANCORANDOM 9.1 11/19/2022 12:01 AM       MRSA Nasal Swab: N/A. Non-respiratory infection.       Assessment:  Date/Time Dose Concentration AUC   11/19 0001 750 mg q12h 9.1 385   Note: Serum concentrations collected for AUC dosing may appear elevated if collected in close proximity to the dose administered, this is not necessarily an indication of toxicity    Plan:  Current dosing regimen is sub-therapeutic  Increase dose to 1000 mg q12h for predicted AUC/Tr of 511/14.3  Repeat vancomycin concentrations will be ordered as clinically appropriate   Pharmacy will continue to monitor patient and adjust therapy as indicated    Thank you for the consult,  Denisa Ferrera, [facility]

## 2022-11-21 ENCOUNTER — ANESTHESIA (OUTPATIENT)
Dept: SURGERY | Age: 42
DRG: 853 | End: 2022-11-21
Payer: MEDICARE

## 2022-11-21 ENCOUNTER — ANESTHESIA EVENT (OUTPATIENT)
Dept: SURGERY | Age: 42
DRG: 853 | End: 2022-11-21
Payer: MEDICARE

## 2022-11-21 LAB
APPEARANCE UR: CLEAR
BACTERIA URNS QL MICRO: NEGATIVE /HPF
BILIRUB UR QL: NEGATIVE
CASTS URNS QL MICRO: ABNORMAL /LPF
COLOR UR: ABNORMAL
EPI CELLS #/AREA URNS HPF: ABNORMAL /HPF
GLUCOSE UR STRIP.AUTO-MCNC: NEGATIVE MG/DL
HCG UR QL: NEGATIVE
HGB UR QL STRIP: ABNORMAL
KETONES UR QL STRIP.AUTO: NEGATIVE MG/DL
LEUKOCYTE ESTERASE UR QL STRIP.AUTO: NEGATIVE
MUCOUS THREADS URNS QL MICRO: 0 /LPF
NITRITE UR QL STRIP.AUTO: NEGATIVE
PH UR STRIP: 7.5 [PH] (ref 5–9)
PROT UR STRIP-MCNC: NEGATIVE MG/DL
RBC #/AREA URNS HPF: ABNORMAL /HPF
SP GR UR REFRACTOMETRY: 1.01 (ref 1–1.02)
URINE CULTURE IF INDICATED: ABNORMAL
UROBILINOGEN UR QL STRIP.AUTO: 1 EU/DL (ref 0.2–1)
WBC URNS QL MICRO: ABNORMAL /HPF

## 2022-11-21 PROCEDURE — 2580000003 HC RX 258: Performed by: ANESTHESIOLOGY

## 2022-11-21 PROCEDURE — 1100000000 HC RM PRIVATE

## 2022-11-21 PROCEDURE — 6370000000 HC RX 637 (ALT 250 FOR IP): Performed by: ANESTHESIOLOGY

## 2022-11-21 PROCEDURE — 87205 SMEAR GRAM STAIN: CPT

## 2022-11-21 PROCEDURE — 87206 SMEAR FLUORESCENT/ACID STAI: CPT

## 2022-11-21 PROCEDURE — 0JDJ0ZZ EXTRACTION OF RIGHT HAND SUBCUTANEOUS TISSUE AND FASCIA, OPEN APPROACH: ICD-10-PCS | Performed by: ORTHOPAEDIC SURGERY

## 2022-11-21 PROCEDURE — 26011 DRAINAGE OF FINGER ABSCESS: CPT | Performed by: ORTHOPAEDIC SURGERY

## 2022-11-21 PROCEDURE — 2580000003 HC RX 258: Performed by: HOSPITALIST

## 2022-11-21 PROCEDURE — 3700000001 HC ADD 15 MINUTES (ANESTHESIA): Performed by: ORTHOPAEDIC SURGERY

## 2022-11-21 PROCEDURE — 6360000002 HC RX W HCPCS: Performed by: NURSE ANESTHETIST, CERTIFIED REGISTERED

## 2022-11-21 PROCEDURE — 81001 URINALYSIS AUTO W/SCOPE: CPT

## 2022-11-21 PROCEDURE — 3700000000 HC ANESTHESIA ATTENDED CARE: Performed by: ORTHOPAEDIC SURGERY

## 2022-11-21 PROCEDURE — 6370000000 HC RX 637 (ALT 250 FOR IP): Performed by: FAMILY MEDICINE

## 2022-11-21 PROCEDURE — 2580000003 HC RX 258: Performed by: FAMILY MEDICINE

## 2022-11-21 PROCEDURE — 81025 URINE PREGNANCY TEST: CPT

## 2022-11-21 PROCEDURE — 3600000012 HC SURGERY LEVEL 2 ADDTL 15MIN: Performed by: ORTHOPAEDIC SURGERY

## 2022-11-21 PROCEDURE — 7100000000 HC PACU RECOVERY - FIRST 15 MIN: Performed by: ORTHOPAEDIC SURGERY

## 2022-11-21 PROCEDURE — 6360000002 HC RX W HCPCS: Performed by: FAMILY MEDICINE

## 2022-11-21 PROCEDURE — 0L973ZZ DRAINAGE OF RIGHT HAND TENDON, PERCUTANEOUS APPROACH: ICD-10-PCS | Performed by: ORTHOPAEDIC SURGERY

## 2022-11-21 PROCEDURE — 7100000001 HC PACU RECOVERY - ADDTL 15 MIN: Performed by: ORTHOPAEDIC SURGERY

## 2022-11-21 PROCEDURE — 3600000002 HC SURGERY LEVEL 2 BASE: Performed by: ORTHOPAEDIC SURGERY

## 2022-11-21 PROCEDURE — 2500000003 HC RX 250 WO HCPCS: Performed by: NURSE ANESTHETIST, CERTIFIED REGISTERED

## 2022-11-21 PROCEDURE — 6360000002 HC RX W HCPCS: Performed by: HOSPITALIST

## 2022-11-21 PROCEDURE — 6360000002 HC RX W HCPCS: Performed by: ANESTHESIOLOGY

## 2022-11-21 PROCEDURE — 6370000000 HC RX 637 (ALT 250 FOR IP): Performed by: HOSPITALIST

## 2022-11-21 PROCEDURE — 2709999900 HC NON-CHARGEABLE SUPPLY: Performed by: ORTHOPAEDIC SURGERY

## 2022-11-21 PROCEDURE — 87077 CULTURE AEROBIC IDENTIFY: CPT

## 2022-11-21 PROCEDURE — 87186 SC STD MICRODIL/AGAR DIL: CPT

## 2022-11-21 RX ORDER — SODIUM CHLORIDE 0.9 % (FLUSH) 0.9 %
5-40 SYRINGE (ML) INJECTION PRN
Status: DISCONTINUED | OUTPATIENT
Start: 2022-11-21 | End: 2022-11-21 | Stop reason: HOSPADM

## 2022-11-21 RX ORDER — HYDROMORPHONE HYDROCHLORIDE 2 MG/ML
0.25 INJECTION, SOLUTION INTRAMUSCULAR; INTRAVENOUS; SUBCUTANEOUS EVERY 5 MIN PRN
Status: DISCONTINUED | OUTPATIENT
Start: 2022-11-21 | End: 2022-11-21 | Stop reason: HOSPADM

## 2022-11-21 RX ORDER — OXYCODONE HYDROCHLORIDE 5 MG/1
10 TABLET ORAL PRN
Status: DISCONTINUED | OUTPATIENT
Start: 2022-11-21 | End: 2022-11-21 | Stop reason: HOSPADM

## 2022-11-21 RX ORDER — MIDAZOLAM HYDROCHLORIDE 2 MG/2ML
2 INJECTION, SOLUTION INTRAMUSCULAR; INTRAVENOUS
Status: COMPLETED | OUTPATIENT
Start: 2022-11-21 | End: 2022-11-21

## 2022-11-21 RX ORDER — EPHEDRINE SULFATE/0.9% NACL/PF 50 MG/5 ML
SYRINGE (ML) INTRAVENOUS PRN
Status: DISCONTINUED | OUTPATIENT
Start: 2022-11-21 | End: 2022-11-21 | Stop reason: SDUPTHER

## 2022-11-21 RX ORDER — SODIUM CHLORIDE, SODIUM LACTATE, POTASSIUM CHLORIDE, CALCIUM CHLORIDE 600; 310; 30; 20 MG/100ML; MG/100ML; MG/100ML; MG/100ML
INJECTION, SOLUTION INTRAVENOUS CONTINUOUS
Status: DISCONTINUED | OUTPATIENT
Start: 2022-11-21 | End: 2022-11-21 | Stop reason: HOSPADM

## 2022-11-21 RX ORDER — OXYCODONE HYDROCHLORIDE 5 MG/1
5 TABLET ORAL PRN
Status: DISCONTINUED | OUTPATIENT
Start: 2022-11-21 | End: 2022-11-21 | Stop reason: HOSPADM

## 2022-11-21 RX ORDER — DIPHENHYDRAMINE HYDROCHLORIDE 50 MG/ML
12.5 INJECTION INTRAMUSCULAR; INTRAVENOUS
Status: DISCONTINUED | OUTPATIENT
Start: 2022-11-21 | End: 2022-11-21 | Stop reason: HOSPADM

## 2022-11-21 RX ORDER — SODIUM CHLORIDE, SODIUM LACTATE, POTASSIUM CHLORIDE, CALCIUM CHLORIDE 600; 310; 30; 20 MG/100ML; MG/100ML; MG/100ML; MG/100ML
INJECTION, SOLUTION INTRAVENOUS CONTINUOUS
Status: DISCONTINUED | OUTPATIENT
Start: 2022-11-21 | End: 2022-11-23

## 2022-11-21 RX ORDER — PHENAZOPYRIDINE HYDROCHLORIDE 95 MG/1
95 TABLET ORAL
Status: DISCONTINUED | OUTPATIENT
Start: 2022-11-21 | End: 2022-11-24 | Stop reason: HOSPADM

## 2022-11-21 RX ORDER — SODIUM CHLORIDE 0.9 % (FLUSH) 0.9 %
5-40 SYRINGE (ML) INJECTION EVERY 12 HOURS SCHEDULED
Status: DISCONTINUED | OUTPATIENT
Start: 2022-11-21 | End: 2022-11-21 | Stop reason: HOSPADM

## 2022-11-21 RX ORDER — HYDROMORPHONE HYDROCHLORIDE 2 MG/ML
0.5 INJECTION, SOLUTION INTRAMUSCULAR; INTRAVENOUS; SUBCUTANEOUS EVERY 10 MIN PRN
Status: DISCONTINUED | OUTPATIENT
Start: 2022-11-21 | End: 2022-11-21 | Stop reason: HOSPADM

## 2022-11-21 RX ORDER — ONDANSETRON 2 MG/ML
4 INJECTION INTRAMUSCULAR; INTRAVENOUS
Status: DISCONTINUED | OUTPATIENT
Start: 2022-11-21 | End: 2022-11-21 | Stop reason: HOSPADM

## 2022-11-21 RX ORDER — FENTANYL CITRATE 50 UG/ML
100 INJECTION, SOLUTION INTRAMUSCULAR; INTRAVENOUS
Status: DISCONTINUED | OUTPATIENT
Start: 2022-11-21 | End: 2022-11-21 | Stop reason: HOSPADM

## 2022-11-21 RX ORDER — LIDOCAINE HYDROCHLORIDE 20 MG/ML
INJECTION, SOLUTION EPIDURAL; INFILTRATION; INTRACAUDAL; PERINEURAL PRN
Status: DISCONTINUED | OUTPATIENT
Start: 2022-11-21 | End: 2022-11-21 | Stop reason: SDUPTHER

## 2022-11-21 RX ORDER — ACETAMINOPHEN 500 MG
1000 TABLET ORAL ONCE
Status: COMPLETED | OUTPATIENT
Start: 2022-11-21 | End: 2022-11-21

## 2022-11-21 RX ORDER — ONDANSETRON 2 MG/ML
INJECTION INTRAMUSCULAR; INTRAVENOUS PRN
Status: DISCONTINUED | OUTPATIENT
Start: 2022-11-21 | End: 2022-11-21 | Stop reason: SDUPTHER

## 2022-11-21 RX ORDER — PROPOFOL 10 MG/ML
INJECTION, EMULSION INTRAVENOUS PRN
Status: DISCONTINUED | OUTPATIENT
Start: 2022-11-21 | End: 2022-11-21 | Stop reason: SDUPTHER

## 2022-11-21 RX ADMIN — LIDOCAINE HYDROCHLORIDE 80 MG: 20 INJECTION, SOLUTION EPIDURAL; INFILTRATION; INTRACAUDAL; PERINEURAL at 17:50

## 2022-11-21 RX ADMIN — FLUCONAZOLE 150 MG: 100 TABLET ORAL at 09:45

## 2022-11-21 RX ADMIN — OXYCODONE 10 MG: 5 TABLET ORAL at 09:44

## 2022-11-21 RX ADMIN — HYDROMORPHONE HYDROCHLORIDE 1 MG: 1 INJECTION, SOLUTION INTRAMUSCULAR; INTRAVENOUS; SUBCUTANEOUS at 08:03

## 2022-11-21 RX ADMIN — VANCOMYCIN HYDROCHLORIDE 1000 MG: 1 INJECTION, POWDER, LYOPHILIZED, FOR SOLUTION INTRAVENOUS at 16:41

## 2022-11-21 RX ADMIN — OXYCODONE 10 MG: 5 TABLET ORAL at 13:12

## 2022-11-21 RX ADMIN — SODIUM CHLORIDE, SODIUM LACTATE, POTASSIUM CHLORIDE, AND CALCIUM CHLORIDE: 600; 310; 30; 20 INJECTION, SOLUTION INTRAVENOUS at 17:28

## 2022-11-21 RX ADMIN — HYDROMORPHONE HYDROCHLORIDE 1 MG: 1 INJECTION, SOLUTION INTRAMUSCULAR; INTRAVENOUS; SUBCUTANEOUS at 22:07

## 2022-11-21 RX ADMIN — BENZTROPINE MESYLATE 0.5 MG: 1 TABLET ORAL at 22:07

## 2022-11-21 RX ADMIN — OLANZAPINE 7.5 MG: 5 TABLET, FILM COATED ORAL at 22:06

## 2022-11-21 RX ADMIN — MIDAZOLAM 2 MG: 1 INJECTION INTRAMUSCULAR; INTRAVENOUS at 17:43

## 2022-11-21 RX ADMIN — ONDANSETRON 4 MG: 2 INJECTION INTRAMUSCULAR; INTRAVENOUS at 18:21

## 2022-11-21 RX ADMIN — SODIUM CHLORIDE, PRESERVATIVE FREE 10 ML: 5 INJECTION INTRAVENOUS at 09:47

## 2022-11-21 RX ADMIN — BENZTROPINE MESYLATE 0.5 MG: 1 TABLET ORAL at 04:36

## 2022-11-21 RX ADMIN — Medication 10 MG: at 17:57

## 2022-11-21 RX ADMIN — PRAZOSIN HYDROCHLORIDE 2 MG: 1 CAPSULE ORAL at 22:06

## 2022-11-21 RX ADMIN — PROPOFOL 150 MG: 10 INJECTION, EMULSION INTRAVENOUS at 17:50

## 2022-11-21 RX ADMIN — PHENAZOPYRIDINE HYDROCHLORIDE 95 MG: 95 TABLET ORAL at 20:06

## 2022-11-21 RX ADMIN — VANCOMYCIN HYDROCHLORIDE 250 ML: 1 INJECTION, POWDER, LYOPHILIZED, FOR SOLUTION INTRAVENOUS at 17:57

## 2022-11-21 RX ADMIN — OXYCODONE 10 MG: 5 TABLET ORAL at 05:32

## 2022-11-21 RX ADMIN — SODIUM CHLORIDE, POTASSIUM CHLORIDE, SODIUM LACTATE AND CALCIUM CHLORIDE: 600; 310; 30; 20 INJECTION, SOLUTION INTRAVENOUS at 20:22

## 2022-11-21 RX ADMIN — HYDROMORPHONE HYDROCHLORIDE 1 MG: 1 INJECTION, SOLUTION INTRAMUSCULAR; INTRAVENOUS; SUBCUTANEOUS at 16:57

## 2022-11-21 RX ADMIN — HYDROMORPHONE HYDROCHLORIDE 1 MG: 1 INJECTION, SOLUTION INTRAMUSCULAR; INTRAVENOUS; SUBCUTANEOUS at 11:45

## 2022-11-21 RX ADMIN — OXYCODONE 10 MG: 5 TABLET ORAL at 20:06

## 2022-11-21 RX ADMIN — ACETAMINOPHEN 1000 MG: 500 TABLET ORAL at 17:41

## 2022-11-21 RX ADMIN — VANCOMYCIN HYDROCHLORIDE 1000 MG: 1 INJECTION, POWDER, LYOPHILIZED, FOR SOLUTION INTRAVENOUS at 04:30

## 2022-11-21 RX ADMIN — PANTOPRAZOLE SODIUM 40 MG: 40 TABLET, DELAYED RELEASE ORAL at 05:33

## 2022-11-21 RX ADMIN — SODIUM CHLORIDE, PRESERVATIVE FREE 10 ML: 5 INJECTION INTRAVENOUS at 22:08

## 2022-11-21 RX ADMIN — ONDANSETRON 4 MG: 2 INJECTION INTRAMUSCULAR; INTRAVENOUS at 08:02

## 2022-11-21 RX ADMIN — HYDROMORPHONE HYDROCHLORIDE 1 MG: 1 INJECTION, SOLUTION INTRAMUSCULAR; INTRAVENOUS; SUBCUTANEOUS at 01:46

## 2022-11-21 RX ADMIN — BENZTROPINE MESYLATE 0.5 MG: 1 TABLET ORAL at 13:13

## 2022-11-21 ASSESSMENT — PAIN DESCRIPTION - LOCATION
LOCATION: HAND
LOCATION: HAND
LOCATION: FINGER (COMMENT WHICH ONE)
LOCATION: FINGER (COMMENT WHICH ONE)
LOCATION: HAND
LOCATION: FINGER (COMMENT WHICH ONE)
LOCATION: FINGER (COMMENT WHICH ONE)
LOCATION: HAND

## 2022-11-21 ASSESSMENT — PAIN DESCRIPTION - DESCRIPTORS
DESCRIPTORS: ACHING;CRAMPING
DESCRIPTORS: ACHING;THROBBING
DESCRIPTORS: ACHING;DISCOMFORT
DESCRIPTORS: ACHING;DISCOMFORT
DESCRIPTORS: ACHING
DESCRIPTORS: ACHING;THROBBING
DESCRIPTORS: ACHING;THROBBING
DESCRIPTORS: ACHING

## 2022-11-21 ASSESSMENT — PAIN DESCRIPTION - ORIENTATION
ORIENTATION: RIGHT

## 2022-11-21 ASSESSMENT — PAIN SCALES - GENERAL
PAINLEVEL_OUTOF10: 6
PAINLEVEL_OUTOF10: 7
PAINLEVEL_OUTOF10: 6
PAINLEVEL_OUTOF10: 7
PAINLEVEL_OUTOF10: 0
PAINLEVEL_OUTOF10: 8
PAINLEVEL_OUTOF10: 6
PAINLEVEL_OUTOF10: 0
PAINLEVEL_OUTOF10: 7
PAINLEVEL_OUTOF10: 0
PAINLEVEL_OUTOF10: 7
PAINLEVEL_OUTOF10: 6

## 2022-11-21 ASSESSMENT — PAIN - FUNCTIONAL ASSESSMENT
PAIN_FUNCTIONAL_ASSESSMENT: 0-10
PAIN_FUNCTIONAL_ASSESSMENT: ACTIVITIES ARE NOT PREVENTED

## 2022-11-21 ASSESSMENT — LIFESTYLE VARIABLES: SMOKING_STATUS: 1

## 2022-11-21 NOTE — OP NOTE
Operative Report    Patient: Domenico Bashir MRN: [id number]  SSN: [id number]    YOB: 1980  Age: 38 y.o. Sex: female       Date of Surgery: November 21, 2022     History:  Domenico Bashir is a 38 y.o. female who has a significant mount of increasing pain and swelling in her right index finger. She was seen in the emergency room and at that time it did appear that the finger was more on the dorsal aspect of the finger and this was lanced in the emergency room. As result of this I do think it was reasonable to see if it would improve with IV antibiotics. She has gotten IV antibiotics over the last 48 hours and she has really not had much clinical improvement. So I did think it was reasonable to go to the operating room today to debride this. I did talk to her about the fact that we would likely open up the area proximal to her A1 pulley just to make sure it did not seem to involve her flexor tendon sheath. It appears to be more in the finger itself and in the tendon sheath. I talked to the patient and/or their representative and explained the exact nature the procedure. I also went through a detailed list of the material risks associated with  the procedure which included risk of bleeding, infection, injury to nearby structures, worsening the situation, as well as the risks associate with anesthesia and finally death. Also talked with him regarding the benefits and alternatives to the procedure. Preoperative Diagnosis:   Right finger abscess  Postoperative Diagnosis:   Right finger abscess      Surgeon(s) and Role:     * Dominga Melchor MD - Primary    Anesthesia: General     Procedure: Drainage of right finger abscess    Procedure in Detail: 0After the successful induction of general anesthetic the right upper extremity was prepped and draped in usual sterile fashion. I then began the process of examining the index finger itself.  There was a significant amount of purulence throughout the subcutaneous tissues in the right index finger. She has had a significant mount of her skin sloughed off and I did express a significant mount of purulence from the subcutaneous tissue throughout her right index finger. I made a small incision over the base of her index finger on the volar aspect of her finger and then dissected down bluntly with scissors to the area just proximal to the A1 pulley. When I entered this area there did not appear to be any purulence in the flexor tendon sheath proximal to the A1 pulley. I was able to place a syringe with a small Jelco into the flexor tendon sheath and I thoroughly irrigated the flexor tendon sheath making sure that fluid egressed distally throughout the multiple wounds she had on the volar aspect of her finger and really on the medial and lateral aspects of the index finger as well. After thorough thorough irrigation with this I also irrigated throughout the subcutaneous tissue along the index finger itself. I did use tenotomy scissors to debride some subcutaneous tissue that was obviously nonviable as well as very small portions of the flexor tendon and the tendon sheath that was nonviable. I did send some tissue and fluid for culture. As I felt I had performed an adequate debridement and there was no hilario purulence left in the subcutaneous tissue throughout the skin I then placed bacitracin as well as a Xeroform over the index finger and then placed her in a bulky dressing with 4 x 4's between her fingers and over the finger incision itself and then placed her in a volar splint in intrinsic plus position.   The splint was in place she was then awakened and taken to cover him in stable condition there were no apparent complications      Estimated Blood Loss: 30 cc    Tourniquet Time: * No tourniquets in log *      Implants: * No implants in log *            Specimens:   ID Type Source Tests Collected by Time Destination   1 : right index finger abscess  Body Fluid Finger CULTURE, BODY FLUID, AFB CULTURE + SMEAR W/RFLX ID FROM CULTURE Matti Gloria MD 11/21/2022 1823            Drains: None                Complications: None    Counts: Sponge and needle counts were correct times two.     Signed By:  Matti Gloria MD     November 21, 2022

## 2022-11-21 NOTE — PROGRESS NOTES
Pt resting in bed awake. Alert and oriented times 3 at this time. On RA. No s/sx of distress noted. Reports 7/10 pain in right finger. Requests pain meds. Encouraged to call for assistance as needed. Call light within reach. Will continue to monitor.

## 2022-11-21 NOTE — ANESTHESIA PROCEDURE NOTES
Airway  Date/Time: 11/21/2022 5:54 PM  Urgency: elective    Airway not difficult    General Information and Staff    Patient location during procedure: OR  Resident/CRNA: GILBERTO Rogers - RAJ    Indications and Patient Condition  Indications for airway management: anesthesia and airway protection  Spontaneous Ventilation: absent  Sedation level: deep  Preoxygenated: yes  Patient position: sniffing  MILS maintained throughout  Mask difficulty assessment: vent by bag mask    Final Airway Details  Final airway type: supraglottic airway      Successful airway: oropharyngeal  Size 4     Number of attempts at approach: 1  Ventilation between attempts: bag mask  Number of other approaches attempted: 1    no

## 2022-11-21 NOTE — PROGRESS NOTES
TRANSFER - OUT REPORT:    Verbal report given to [address] on [employer]  being transferred to PreOp for ordered procedure       Report consisted of patient's Situation, Background, Assessment and   Recommendations(SBAR). Information from the following report(s) Adult Overview, Intake/Output, and MAR was reviewed with the receiving nurse. [place] Assessment: No data recorded  Lines:   Peripheral IV 08/56/76 Left Cephalic (Active)   Site Assessment Clean, dry & intact 11/21/22 0344   Line Status Capped 11/21/22 0344   Line Care Cap changed 11/21/22 0344   Phlebitis Assessment No symptoms 11/21/22 0344   Alcohol Cap Used No 11/21/22 0344   Dressing Status New dressing applied 11/21/22 0344   Dressing Type Transparent 11/21/22 0344   Dressing Intervention New 11/21/22 0344        Opportunity for questions and clarification was provided.       Patient transported with:  [employer]

## 2022-11-21 NOTE — PROGRESS NOTES
Hospitalist Progress Note   Admit Date:  2022 11:07 AM   Name:  Minna Barrios   Age:  38 y.o. Sex:  female  :  1980   MRN:  [id number]   Room:  [facility]/    Reason(s) for Admission: Finger infection [L08.9]  Finger wound, simple, open, initial encounter [facility] Course & Interval History:   Ms. Dee Dee Ann is a 39 y/o female with a h/o polysubstance abuse and PTSD who was admitted to our service on  with cellulitis of the R index finger. No recent trauma or injury to the area. Labs unremarkable on admission. The area was lanced by the ER team with purulent material noted. X-ray showed soft tissue swelling without foreign body or other concerning findings. She was started on vancomycin and ceftriaxone. Orthopedic Surgery was consulted. Cultures are pending. Subjective/24hr Events (22): Patient seen at bedside, resting in bed comfortably. Patient is n.p.o. since midnight for incision and debridement today by orthopedics. No fever, nausea vomiting, chest pain, shortness of breath, palpitations. ROS:  10 point review of system is negative except for what mentioned above    Assessment & Plan:   # Cellulitis of R index finger   -:  Status post I&D in ER, wound culture positive for heavy MRSA. Plan for I&D today by Dr. Viet Becerra, patient n.p.o. since midnight. Pain control with oxycodone and Dilaudid. Started on fluconazole to treat yeast infection. Blood cultures negative to date. Continue vancomycin    #Severe protein calorie malnutrition  :  Ordered for high calorie and protein supplement with each meal.  BMI 16.9. # PTSD   - Con't home medications    # H/o polysubstance abuse     Discharge Planning: Home when able. Diet:  ADULT DIET;  Regular  ADULT ORAL NUTRITION SUPPLEMENT; Breakfast, Lunch, Dinner; Standard High Calorie/High Protein Oral Supplement  DVT PPx:   Code status: Full Code    I spent 36 minutes of time caring for this patient on the unit nearby or at bedside, and more than 50 percent was spent on coordination of care activities, and/or patient/family counseling regarding status and plan of care. Hospital Problems             Last Modified POA    * (Principal) Finger wound, simple, open, initial encounter 11/17/2022 Yes    Severe protein-calorie malnutrition Cheli Tsang: less than 60% of standard weight) ([facility] [address] 11/19/2022 Yes    Chronic post-traumatic stress disorder (PTSD) 11/17/2022 Yes    Stimulant use disorder 11/17/2022 Yes   Objective:   Patient Vitals for the past 24 hrs:   Temp Pulse Resp BP SpO2   11/21/22 0800 -- 76 -- 99/60 --   11/21/22 0713 98.2 °F (36.8 °C) 81 18 92/63 95 %   11/21/22 0602 -- -- 17 -- --   11/21/22 0532 -- -- 18 -- --   11/21/22 0443 97.3 °F (36.3 °C) 76 16 93/61 97 %   11/21/22 0216 -- -- 17 -- --   11/21/22 0146 -- -- 19 -- --   11/21/22 0038 98 °F (36.7 °C) 90 18 101/63 97 %   11/20/22 2307 -- -- 17 -- --   11/20/22 2238 -- -- -- 108/65 --   11/20/22 2120 97.7 °F (36.5 °C) 84 18 104/66 96 %   11/20/22 2050 -- -- 16 -- --   11/20/22 2020 -- -- 18 -- --   11/20/22 1454 98.1 °F (36.7 °C) 86 20 100/66 97 %   11/20/22 1409 -- -- 20 -- --   11/20/22 1143 -- -- 18 -- --   11/20/22 1112 98.2 °F (36.8 °C) 91 16 (!) 101/55 98 %         Estimated body mass index is 16.92 kg/m² as calculated from the following:    Height as of this encounter: 5' 7\" (1.702 m). Weight as of this encounter: 108 lb (49 kg). No intake or output data in the 24 hours ending 11/21/22 0840        Physical Exam:   Blood pressure 99/60, pulse 76, temperature 98.2 °F (36.8 °C), temperature source Oral, resp. rate 18, height 5' 7\" (1.702 m), weight 108 lb (49 kg), SpO2 95 %. General:    Alert, awake, NAD  Head:  Normocephalic, atraumatic  Eyes:  Sclerae appear normal. Pupils equally round. ENT:  Nares appear normal, no drainage. Moist oral mucosa  Neck:  No restricted ROM. Trachea midline. CV:   RRR. No m/r/g.  No jugular venous distension. Lungs:   CTAB. No wheezing, rhonchi, or rales. Respirations even, unlabored. Abdomen: Bowel sounds present. Soft, nontender, nondistended. Extremities: No cyanosis or clubbing. The R index finger is swollen from the knuckle to the DIP joint, there is erythema and purulent drainage from the dorsum of the finger and the anterior aspect, it is tender to palpation. Skin:     No rashes and normal coloration. Warm and dry. Neuro:  GCS 15, cranial nerves intact, no motor or sensory deficit  Psych:  AOx3, mood and affect appropriate    I have reviewed ordered lab tests and independently visualized imaging below:    Recent Labs:  No results found for this or any previous visit (from the past 48 hour(s)). Other Studies:  XR HAND RIGHT (MIN 3 VIEWS)    Result Date: 11/17/2022  Right HAND RADIOGRAPHS INDICATION: Indexed her pain and swelling COMPARISON: None available TECHNIQUE: 3 views of the right hand were obtained. FINDINGS: Severe soft tissue swelling throughout the index finger. No fracture, malalignment, or bone destruction evident. No soft tissue gas or radiopaque foreign body. Severe soft tissue swelling of the index finger without acute underlying osteoarticular process evident by x-ray. No foreign body. XR CHEST PORTABLE    Result Date: 11/17/2022  CHEST X-RAY, 1 VIEW INDICATION: Sepsis COMPARISON: None available TECHNIQUE: Single AP view of the chest was obtained. FINDINGS: Lungs and pleural spaces: Normal. No pneumothorax or pleural effusion. Cardiomediastinal silhouette: Normal. Osseous structures: Normal.     No radiographic evidence of acute cardiopulmonary disease.        Current Meds:  Current Facility-Administered Medications   Medication Dose Route Frequency    vancomycin (VANCOCIN) 1,000 mg in sodium chloride 0.9 % 250 mL IVPB ([id number])  1,000 mg IntraVENous Q12H    oxyCODONE (ROXICODONE) immediate release tablet 10 mg  10 mg Oral Q4H PRN    ibuprofen (ADVIL;MOTRIN) tablet 800 mg  800 mg Oral Q8H PRN    HYDROmorphone HCl PF (DILAUDID) injection 1 mg  1 mg IntraVENous Q4H PRN    pantoprazole (PROTONIX) tablet 40 mg  40 mg Oral QAM AC    fluconazole (DIFLUCAN) tablet 150 mg  150 mg Oral Daily    lidocaine 1 % injection 5 mL  5 mL IntraDERmal Once    sodium chloride flush 0.9 % injection 5-40 mL  5-40 mL IntraVENous 2 times per day    sodium chloride flush 0.9 % injection 5-40 mL  5-40 mL IntraVENous PRN    0.9 % sodium chloride infusion   IntraVENous PRN    ondansetron (ZOFRAN-ODT) disintegrating tablet 4 mg  4 mg Oral Q8H PRN    Or    ondansetron (ZOFRAN) injection 4 mg  4 mg IntraVENous Q6H PRN    polyethylene glycol (GLYCOLAX) packet 17 g  17 g Oral Daily PRN    acetaminophen (TYLENOL) tablet 650 mg  650 mg Oral Q6H PRN    Or    acetaminophen (TYLENOL) suppository 650 mg  650 mg Rectal Q6H PRN    benztropine (COGENTIN) tablet 0.5 mg  0.5 mg Oral q8h    prazosin (MINIPRESS) capsule 2 mg  2 mg Oral Nightly    OLANZapine (ZYPREXA) tablet 7.5 mg  7.5 mg Oral Nightly    cefTRIAXone (ROCEPHIN) 1,000 mg in sodium chloride 0.9 % 50 mL IVPB mini-bag  1,000 mg IntraVENous Q24H       Signed:  Mike Curiel MD    Part of this note may have been written by using a voice dictation software. The note has been proof read but may still contain some grammatical/other typographical errors.

## 2022-11-21 NOTE — PROGRESS NOTES
Patient reports pain of 8/10 in R Finger    Gave 1 mg dilaudid iv. Reports headache and requested tylenol. Gave 650 mg tylenol po.

## 2022-11-21 NOTE — INTERVAL H&P NOTE
Update History & Physical    The Patient's History and Physical of 1/17/2022 was reviewed with the patient and I examined the patient. There was no change. The surgical site was confirmed by the patient and me. Plan:  The risk, benefits, expected outcome, and alternative to the recommended procedure have been discussed with the patient. Patient understands and wants to proceed with  drainage of right index finger abscess.     Electronically signed by Mann Braxton MD on 11/21/2022 at 8:07 AM

## 2022-11-21 NOTE — PERIOP NOTE
TRANSFER - IN REPORT:    Verbal report received from [employer], RN on [employer] being received from [phone] for ordered procedure      Report consisted of patients Situation, Background, Assessment and Recommendations(SBAR). Information from the following report(s) SBAR, Kardex, MAR, Recent Results, Procedure Verification, and Quality Measures was reviewed with the receiving nurse. Opportunity for questions and clarification was provided. Assessment completed upon patients arrival to unit and care assumed.

## 2022-11-21 NOTE — PROGRESS NOTES
VANCO DAILY FOLLOW UP NOTE  [address] [facility] Pharmacokinetic Monitoring Service - Vancomycin    Consulting Provider: Dr. Jennifer Mariee   Indication: SSTI  Target Concentration: Goal trough of 10-15 mg/L and AUC/ANDREA <500 mg*hr/L  Day of Therapy: 4  Additional Antimicrobials: none    Patient eligible for piperacillin-tazobactam to cefepime auto-substitution per P&T approved protocol? NO    Pertinent Laboratory Values: Wt Readings from Last 1 Encounters:   11/17/22 108 lb (49 kg)     Temp Readings from Last 1 Encounters:   11/21/22 98.2 °F (36.8 °C) (Oral)     Recent Labs     11/19/22  0323   WBC 10.7     Estimated Creatinine Clearance: 83 mL/min (based on SCr of 0.7 mg/dL). Lab Results   Component Value Date/Time    VANCORANDOM 9.1 11/19/2022 12:01 AM       MRSA Nasal Swab: N/A. Non-respiratory infection.       Assessment:  Date/Time Dose Concentration AUC   11/19 0001 750 mg q12h 9.1 385   Note: Serum concentrations collected for AUC dosing may appear elevated if collected in close proximity to the dose administered, this is not necessarily an indication of toxicity    Plan:  Continue 1000 mg every 12 hours  Repeat vancomycin concentrations will be ordered as clinically appropriate   Pharmacy will continue to monitor patient and adjust therapy as indicated    Thank you for the consult,  Monet Fierro, [facility]

## 2022-11-21 NOTE — ANESTHESIA PRE PROCEDURE
Department of Anesthesiology  Preprocedure Note       Name:  Cristy Gusman   Age:  38 y.o.  :  1980                                          MRN:  [id number]         Date:  2022      Surgeon: Shukri New):  Claudia Valencia MD    Procedure: Procedure(s):  RIGHT INDEX FINGER DEBRIDEMENT INCISION AND DRAINAGE    Medications prior to admission:   Prior to Admission medications    Medication Sig Start Date End Date Taking?  Authorizing Provider   benztropine (COGENTIN) 0.5 MG tablet Take 0.5 mg by mouth 2 times daily 10/14/21   Ar Automatic Reconciliation   benztropine (COGENTIN) 1 MG tablet Take 1 mg by mouth 3 times daily as needed 20  Ar Automatic Reconciliation   divalproex (DEPAKOTE) 500 MG DR tablet TAKE 2 TABLETS BY MOUTH AT BEDTIME 10/14/21   Ar Automatic Reconciliation   levETIRAcetam (KEPPRA) 500 MG tablet Take 500 mg by mouth 2 times daily    Ar Automatic Reconciliation   magnesium citrate (CITROMA) SOLN Take 296 mLs by mouth daily as needed    Ar Automatic Reconciliation   mirtazapine (REMERON) 15 MG tablet Take 15 mg by mouth    Ar Automatic Reconciliation   ondansetron (ZOFRAN-ODT) 4 MG disintegrating tablet Take 4 mg by mouth every 8 hours as needed 3/15/22   Ar Automatic Reconciliation   prazosin (MINIPRESS) 2 MG capsule TAKE 1 CAPSULE BY MOUTH AT BEDTIME 20   Ar Automatic Reconciliation   QUEtiapine (SEROQUEL) 100 MG tablet Take 100 mg by mouth 20  Ar Automatic Reconciliation   QUEtiapine (SEROQUEL) 200 MG tablet TAKE 2 TABLETS BY MOUTH AT BEDTIME 10/14/21   Ar Automatic Reconciliation   senna (SENOKOT) 8.6 MG tablet Take 1 tablet by mouth 2 times daily    Ar Automatic Reconciliation   traZODone (DESYREL) 50 MG tablet Take 50 mg by mouth    Ar Automatic Reconciliation       Current medications:    Current Facility-Administered Medications   Medication Dose Route Frequency Provider Last Rate Last Admin    fentaNYL (SUBLIMAZE) injection 100 mcg  100 mcg IntraVENous Once PRN Dar Hernandez MD        lactated ringers infusion   IntraVENous Continuous Dar Hernandez  mL/hr at 11/21/22 1728 New Bag at 11/21/22 1728    sodium chloride flush 0.9 % injection 5-40 mL  5-40 mL IntraVENous 2 times per day Dar Hernandez MD        sodium chloride flush 0.9 % injection 5-40 mL  5-40 mL IntraVENous PRN Dar Hernandez MD        miconazole (MICOTIN) 2 % vaginal cream 1 applicator  1 applicator Vaginal Nightly Laverne Winters MD        phenazopyridine (PYRIDIUM) tablet 95 mg  95 mg Oral TID WC Laverne Winters MD        vancomycin (VANCOCIN) 1,000 mg in sodium chloride 0.9 % 250 mL IVPB ([id number])  1,000 mg IntraVENous Q12H Laverne Winters  mL/hr at 11/21/22 1727 Restarted at 11/21/22 1727    oxyCODONE (ROXICODONE) immediate release tablet 10 mg  10 mg Oral Q4H PRN Laverne Winters MD   10 mg at 11/21/22 1312    ibuprofen (ADVIL;MOTRIN) tablet 800 mg  800 mg Oral Q8H PRN Laverne Winters MD   800 mg at 11/20/22 1747    HYDROmorphone HCl PF (DILAUDID) injection 1 mg  1 mg IntraVENous Q4H PRN Laverne Winters MD   1 mg at 11/21/22 1657    pantoprazole (PROTONIX) tablet 40 mg  40 mg Oral QAM AC Laverne Winters MD   40 mg at 11/21/22 0533    fluconazole (DIFLUCAN) tablet 150 mg  150 mg Oral Daily Laverne Winters MD   150 mg at 11/21/22 0945    lidocaine 1 % injection 5 mL  5 mL IntraDERmal Once [place], [place]        sodium chloride flush 0.9 % injection 5-40 mL  5-40 mL IntraVENous 2 times per day Contreras Prow, DO   10 mL at 11/21/22 0947    sodium chloride flush 0.9 % injection 5-40 mL  5-40 mL IntraVENous PRN Contreras Prow, DO   10 mL at 11/19/22 1324    0.9 % sodium chloride infusion   IntraVENous PRN Contreras Prow, DO 20 mL/hr at 11/18/22 0312 New Bag at 11/18/22 0312    ondansetron (ZOFRAN-ODT) disintegrating tablet 4 mg  4 mg Oral Q8H PRN Contreras Prow, DO   4 mg at 11/20/22 1748    Or    ondansetron (Guille Cornejo) injection 4 mg  4 mg IntraVENous Q6H PRN Milbert Dashawn, DO   4 mg at 11/21/22 0802    polyethylene glycol (GLYCOLAX) packet 17 g  17 g Oral Daily PRN Milbert Dashawn, DO        acetaminophen (TYLENOL) tablet 650 mg  650 mg Oral Q6H PRN Milbert Dashawn, DO   650 mg at 11/20/22 2020    Or    acetaminophen (TYLENOL) suppository 650 mg  650 mg Rectal Q6H PRN Milbert Dashawn, DO        benztropine (COGENTIN) tablet 0.5 mg  0.5 mg Oral q8h Milbert Dashawn, DO   0.5 mg at 11/21/22 1313    prazosin (MINIPRESS) capsule 2 mg  2 mg Oral Nightly Milbert Dashawn, DO   2 mg at 11/20/22 2238    OLANZapine (ZYPREXA) tablet 7.5 mg  7.5 mg Oral Nightly Milbert Dashawn, DO   7.5 mg at 11/20/22 2238       Allergies:  No Known Allergies    Problem List:    Patient Active Problem List   Diagnosis Code    Chronic post-traumatic stress disorder (PTSD) F43.12    Stimulant use disorder F15.90    Alcohol withdrawal syndrome with complication ([facility]) V81.445    Back pain M54.9    Common bile duct dilatation K83.8    Abdominal pain R10.9    Alcohol use disorder, severe, dependence ([address] F10.20    Opioid use disorder, severe, in early remission ([address] F11.21    Psychosis ([facility] F29    Serotonin syndrome G25.79    Polysubstance abuse ([address] F19.10    Gall bladder disease K82.9    Elevated LFTs R79.89    Schizoaffective disorder, depressive type ([address] F25.1    Vaginal candida B37.31    Extrapyramidal symptom R29.818    Altered mental status R41.82    Finger wound, simple, open, initial encounter S61.209A    Severe protein-calorie malnutrition [place]: less than 60% of standard weight) ([address] E43       Past Medical History:        Diagnosis Date    Alcohol withdrawal syndrome with complication ([address] 14/37/6102    Anxiety and depression     anxiety, depression    Back pain     problems with L4-5 vertebra    Common bile duct dilatation 6/12/2013    Depression     Gall bladder disease 6/12/2013    Psychiatric disorder  Vaginal candida 11/6/2017       Past Surgical History:        Procedure Laterality Date    CHOLECYSTECTOMY      ORTHOPEDIC SURGERY  2007    left foot, tonail excision       Social History:    Social History     Tobacco Use    Smoking status: Never    Smokeless tobacco: Not on file   Substance Use Topics    Alcohol use: No                                Counseling given: Not Answered      Vital Signs (Current):   Vitals:    11/21/22 0800 11/21/22 1115 11/21/22 1443 11/21/22 1736   BP: 99/60 95/64 95/66 (!) 112/59   Pulse: 76 88 89 97   Resp:  18 18 20   Temp:  98.2 °F (36.8 °C) 98.1 °F (36.7 °C) 99.7 °F (37.6 °C)   TempSrc:  Oral Oral Temporal   SpO2:  95% 99% 96%   Weight:       Height:                                                  BP Readings from Last 3 Encounters:   11/21/22 (!) 112/59       NPO Status: Time of last liquid consumption: 2300                        Time of last solid consumption: 2300                        Date of last liquid consumption: 11/20/22                        Date of last solid food consumption: 11/20/22    BMI:   Wt Readings from Last 3 Encounters:   11/17/22 108 lb (49 kg)     Body mass index is 16.92 kg/m².     CBC:   Lab Results   Component Value Date/Time    WBC 10.7 11/19/2022 03:23 AM    RBC 3.73 11/19/2022 03:23 AM    HGB 10.5 11/19/2022 03:23 AM    HCT 33.1 11/19/2022 03:23 AM    MCV 88.7 11/19/2022 03:23 AM    RDW 14.2 11/19/2022 03:23 AM     11/19/2022 03:23 AM       CMP:   Lab Results   Component Value Date/Time     11/18/2022 03:41 AM    K 3.8 11/18/2022 03:41 AM     11/18/2022 03:41 AM    CO2 28 11/18/2022 03:41 AM    BUN 10 11/18/2022 03:41 AM    CREATININE 0.70 11/18/2022 03:41 AM    GFRAA >60 12/04/2021 05:38 PM    AGRATIO 1.0 12/04/2021 05:38 PM    LABGLOM >60 11/18/2022 03:41 AM    GLUCOSE 79 11/18/2022 03:41 AM    PROT 7.4 11/17/2022 11:05 AM    CALCIUM 8.6 11/18/2022 03:41 AM    BILITOT 0.3 11/17/2022 11:05 AM    ALKPHOS 83 11/17/2022 11:05 AM    ALKPHOS 77 12/04/2021 05:38 PM    AST 18 11/17/2022 11:05 AM    ALT 41 11/17/2022 11:05 AM       POC Tests: No results for input(s): POCGLU, POCNA, POCK, POCCL, POCBUN, POCHEMO, POCHCT in the last 72 hours. Coags: No results found for: PROTIME, INR, APTT    HCG (If Applicable):   Lab Results   Component Value Date    PREGTESTUR Negative 11/21/2022        ABGs: No results found for: PHART, PO2ART, FXE1PHO, TYN4LMT, BEART, P7ZKRFLS     Type & Screen (If Applicable):  No results found for: LABABO, LABRH    Drug/Infectious Status (If Applicable):  No results found for: HIV, HEPCAB    COVID-19 Screening (If Applicable): No results found for: COVID19        Anesthesia Evaluation  Patient summary reviewed and Nursing notes reviewed  Airway: Mallampati: II  TM distance: >3 FB   Neck ROM: full  Mouth opening: > = 3 FB   Dental:    (+) poor dentition      Pulmonary: breath sounds clear to auscultation  (+) current smoker (vapor products)          Patient did not smoke on day of surgery. Cardiovascular:  Exercise tolerance: good (>4 METS),           Rhythm: regular  Rate: normal                    Neuro/Psych:   (+) psychiatric history:            GI/Hepatic/Renal: Neg GI/Hepatic/Renal ROS            Endo/Other: Negative Endo/Other ROS                     ROS comment: UDS only positive for opiates, no benzo, cocaine, or THC    H/o drug use per EMR, underweight Abdominal:             Vascular: Other Findings:           Anesthesia Plan      general     ASA 3       Induction: intravenous. MIPS: Postoperative opioids intended and Prophylactic antiemetics administered. Anesthetic plan and risks discussed with patient.                         Denzel Christensen MD   11/21/2022

## 2022-11-22 ENCOUNTER — ANESTHESIA EVENT (OUTPATIENT)
Dept: SURGERY | Age: 42
DRG: 853 | End: 2022-11-22
Payer: MEDICARE

## 2022-11-22 PROBLEM — L02.511 ABSCESS OF RIGHT INDEX FINGER: Status: ACTIVE | Noted: 2022-11-17

## 2022-11-22 LAB
BACTERIA SPEC CULT: NORMAL
BACTERIA SPEC CULT: NORMAL
CREAT SERPL-MCNC: 0.8 MG/DL (ref 0.6–1)
SERVICE CMNT-IMP: NORMAL
SERVICE CMNT-IMP: NORMAL
VANCOMYCIN SERPL-MCNC: 11.6 UG/ML

## 2022-11-22 PROCEDURE — 2580000003 HC RX 258: Performed by: FAMILY MEDICINE

## 2022-11-22 PROCEDURE — 6370000000 HC RX 637 (ALT 250 FOR IP): Performed by: FAMILY MEDICINE

## 2022-11-22 PROCEDURE — 1100000000 HC RM PRIVATE

## 2022-11-22 PROCEDURE — 82565 ASSAY OF CREATININE: CPT

## 2022-11-22 PROCEDURE — 6370000000 HC RX 637 (ALT 250 FOR IP): Performed by: HOSPITALIST

## 2022-11-22 PROCEDURE — 6360000002 HC RX W HCPCS: Performed by: FAMILY MEDICINE

## 2022-11-22 PROCEDURE — 80202 ASSAY OF VANCOMYCIN: CPT

## 2022-11-22 PROCEDURE — 2580000003 HC RX 258: Performed by: HOSPITALIST

## 2022-11-22 PROCEDURE — 6360000002 HC RX W HCPCS: Performed by: HOSPITALIST

## 2022-11-22 PROCEDURE — 36415 COLL VENOUS BLD VENIPUNCTURE: CPT

## 2022-11-22 RX ORDER — DOXYCYCLINE HYCLATE 100 MG/1
100 CAPSULE ORAL EVERY 12 HOURS SCHEDULED
Status: DISCONTINUED | OUTPATIENT
Start: 2022-11-22 | End: 2022-11-24 | Stop reason: HOSPADM

## 2022-11-22 RX ADMIN — PRAZOSIN HYDROCHLORIDE 2 MG: 1 CAPSULE ORAL at 20:38

## 2022-11-22 RX ADMIN — HYDROMORPHONE HYDROCHLORIDE 1 MG: 1 INJECTION, SOLUTION INTRAMUSCULAR; INTRAVENOUS; SUBCUTANEOUS at 05:11

## 2022-11-22 RX ADMIN — PHENAZOPYRIDINE HYDROCHLORIDE 95 MG: 95 TABLET ORAL at 15:39

## 2022-11-22 RX ADMIN — OXYCODONE 10 MG: 5 TABLET ORAL at 20:43

## 2022-11-22 RX ADMIN — ACETAMINOPHEN 650 MG: 325 TABLET, FILM COATED ORAL at 06:24

## 2022-11-22 RX ADMIN — PHENAZOPYRIDINE HYDROCHLORIDE 95 MG: 95 TABLET ORAL at 11:41

## 2022-11-22 RX ADMIN — DOXYCYCLINE HYCLATE 100 MG: 100 CAPSULE ORAL at 20:38

## 2022-11-22 RX ADMIN — ACETAMINOPHEN 650 MG: 325 TABLET, FILM COATED ORAL at 12:54

## 2022-11-22 RX ADMIN — FLUCONAZOLE 150 MG: 100 TABLET ORAL at 08:48

## 2022-11-22 RX ADMIN — HYDROMORPHONE HYDROCHLORIDE 1 MG: 1 INJECTION, SOLUTION INTRAMUSCULAR; INTRAVENOUS; SUBCUTANEOUS at 15:38

## 2022-11-22 RX ADMIN — BENZTROPINE MESYLATE 0.5 MG: 1 TABLET ORAL at 12:54

## 2022-11-22 RX ADMIN — OXYCODONE 10 MG: 5 TABLET ORAL at 11:41

## 2022-11-22 RX ADMIN — SODIUM CHLORIDE, PRESERVATIVE FREE 5 ML: 5 INJECTION INTRAVENOUS at 20:38

## 2022-11-22 RX ADMIN — OXYCODONE 10 MG: 5 TABLET ORAL at 06:24

## 2022-11-22 RX ADMIN — BENZTROPINE MESYLATE 0.5 MG: 1 TABLET ORAL at 20:38

## 2022-11-22 RX ADMIN — POLYETHYLENE GLYCOL 3350 17 G: 17 POWDER, FOR SOLUTION ORAL at 15:39

## 2022-11-22 RX ADMIN — ONDANSETRON 4 MG: 2 INJECTION INTRAMUSCULAR; INTRAVENOUS at 12:53

## 2022-11-22 RX ADMIN — PHENAZOPYRIDINE HYDROCHLORIDE 95 MG: 95 TABLET ORAL at 08:48

## 2022-11-22 RX ADMIN — ONDANSETRON 4 MG: 2 INJECTION INTRAMUSCULAR; INTRAVENOUS at 06:22

## 2022-11-22 RX ADMIN — HYDROMORPHONE HYDROCHLORIDE 1 MG: 1 INJECTION, SOLUTION INTRAMUSCULAR; INTRAVENOUS; SUBCUTANEOUS at 22:48

## 2022-11-22 RX ADMIN — VANCOMYCIN HYDROCHLORIDE 1000 MG: 1 INJECTION, POWDER, LYOPHILIZED, FOR SOLUTION INTRAVENOUS at 05:00

## 2022-11-22 RX ADMIN — SODIUM CHLORIDE, PRESERVATIVE FREE 10 ML: 5 INJECTION INTRAVENOUS at 08:50

## 2022-11-22 RX ADMIN — DOXYCYCLINE HYCLATE 100 MG: 100 CAPSULE ORAL at 08:47

## 2022-11-22 RX ADMIN — HYDROMORPHONE HYDROCHLORIDE 1 MG: 1 INJECTION, SOLUTION INTRAMUSCULAR; INTRAVENOUS; SUBCUTANEOUS at 09:16

## 2022-11-22 RX ADMIN — OLANZAPINE 7.5 MG: 5 TABLET, FILM COATED ORAL at 20:38

## 2022-11-22 ASSESSMENT — PAIN - FUNCTIONAL ASSESSMENT
PAIN_FUNCTIONAL_ASSESSMENT: PREVENTS OR INTERFERES SOME ACTIVE ACTIVITIES AND ADLS
PAIN_FUNCTIONAL_ASSESSMENT: PREVENTS OR INTERFERES SOME ACTIVE ACTIVITIES AND ADLS

## 2022-11-22 ASSESSMENT — PAIN DESCRIPTION - DESCRIPTORS
DESCRIPTORS: ACHING
DESCRIPTORS: ACHING;THROBBING
DESCRIPTORS: ACHING
DESCRIPTORS: POUNDING
DESCRIPTORS: ACHING;THROBBING
DESCRIPTORS: ACHING
DESCRIPTORS: POUNDING

## 2022-11-22 ASSESSMENT — PAIN DESCRIPTION - ONSET
ONSET: ON-GOING
ONSET: ON-GOING

## 2022-11-22 ASSESSMENT — PAIN SCALES - GENERAL
PAINLEVEL_OUTOF10: 4
PAINLEVEL_OUTOF10: 7
PAINLEVEL_OUTOF10: 8
PAINLEVEL_OUTOF10: 9
PAINLEVEL_OUTOF10: 10
PAINLEVEL_OUTOF10: 6
PAINLEVEL_OUTOF10: 8
PAINLEVEL_OUTOF10: 8
PAINLEVEL_OUTOF10: 7
PAINLEVEL_OUTOF10: 6
PAINLEVEL_OUTOF10: 9
PAINLEVEL_OUTOF10: 9

## 2022-11-22 ASSESSMENT — PAIN DESCRIPTION - ORIENTATION
ORIENTATION: RIGHT

## 2022-11-22 ASSESSMENT — PAIN DESCRIPTION - PAIN TYPE
TYPE: ACUTE PAIN
TYPE: ACUTE PAIN

## 2022-11-22 ASSESSMENT — PAIN DESCRIPTION - LOCATION
LOCATION: FINGER (COMMENT WHICH ONE)
LOCATION: HAND;FINGER (COMMENT WHICH ONE)
LOCATION: FINGER (COMMENT WHICH ONE)
LOCATION: FINGER (COMMENT WHICH ONE)
LOCATION: HAND
LOCATION: FINGER (COMMENT WHICH ONE)
LOCATION: HAND
LOCATION: HAND
LOCATION: FINGER (COMMENT WHICH ONE);HAND

## 2022-11-22 ASSESSMENT — PAIN DESCRIPTION - FREQUENCY
FREQUENCY: CONTINUOUS
FREQUENCY: CONTINUOUS

## 2022-11-22 ASSESSMENT — LIFESTYLE VARIABLES: SMOKING_STATUS: 1

## 2022-11-22 NOTE — PROGRESS NOTES
Physician Progress Note      PATIENT:               Fredrick Saunders  CSN #:                  [id number]  :                       1980  ADMIT DATE:       2022 11:07 AM  [address] [facility] DATE:  RESPONDING  PROVIDER #:        Lili Schwab MD          QUERY TEXT:    Pt admitted with cellulitis of her R index finger . Pt noted to have SIRS   criteria with an infection  If possible, please document in the progress notes   and discharge summary if you are evaluating and /or treating any of the   following: The medical record reflects the following:  Risk Factors: stimulant use disorder, psych hx, Cellulitis of r index finger  Clinical Indicators :finger lanced in ER-- purulent drainage. wbc 14.2, pulse   93. ER  notes==. Sepsis was suspected out of triage so the patient had blood   cultures, lab work, antibiotics started. Treatment[de-identified] iv ceftriaxone, vancomycin,  area lanced in ER, labs, xray, ortho   consult. Options provided:  -- Sepsis, present on admission  -- Sepsis, present on admission, now resolved  -- Sepsis, developed following admission  -- Cellulitis without Sepsis  -- Other - I will add my own diagnosis  -- Disagree - Not applicable / Not valid  -- Disagree - Clinically unable to determine / Unknown  -- Refer to Clinical Documentation Reviewer    PROVIDER RESPONSE TEXT:    This patient has sepsis which was present on admission.     Query created by: Holden Flores on 2022 10:42 AM      Electronically signed by:  Lili Schwab MD 2022 7:15 AM

## 2022-11-22 NOTE — PERIOP NOTE
TRANSFER - OUT REPORT:    Verbal report given to Mario Mckeon RN on [employer]  being transferred to [address] [place] [address] for routine post-op       Report consisted of patients Situation, Background, Assessment and   Recommendations(SBAR). Information from the following report(s) Adult Overview, Surgery Report, Intake/Output, and MAR was reviewed with the receiving nurse. Lines:   Peripheral IV 78/77/30 Left Cephalic (Active)   Site Assessment Clean, dry & intact 11/21/22 1837   Line Status Flushed 11/21/22 [address] changed 11/21/22 1145   Phlebitis Assessment No symptoms 11/21/22 1837   Infiltration Assessment 0 11/21/22 1837   Alcohol Cap Used No 11/21/22 1145   Dressing Status Clean, dry & intact 11/21/22 1837   Dressing Type Transparent 11/21/22 1837   Dressing Intervention Other (Comment) 11/21/22 1145        Opportunity for questions and clarification was provided. Patient transported with:   Tech    VTE prophylaxis orders have been written for [employer].

## 2022-11-22 NOTE — PROGRESS NOTES
Pt up ambulating in room. Pt alert oriented times 3 at this time. Pt on RA. Pt complaints of right hand pain 7/10 at this time. No s/sx of distress at this time. Dresssing to right hand. Pt encouraged to call for assistance if needed call light in reach, will monitor.

## 2022-11-22 NOTE — ANESTHESIA POSTPROCEDURE EVALUATION
Department of Anesthesiology  Postprocedure Note    Patient: Ben Shelton  MRN: [id number]  YOB: 1980  Date of evaluation: 11/21/2022      Procedure Summary     Date: 11/21/22 Room / Location: [facility] MAIN OR  / [facility] MAIN OR    Anesthesia Start: 1747 Anesthesia Stop: 1839    Procedure: RIGHT INDEX FINGER DEBRIDEMENT INCISION AND DRAINAGE (Right: Fingers) Diagnosis:       Infected abrasion of finger of right hand, initial encounter      (Closed fracture of distal end of right femur, unspecified fracture morphology, initial encounter [facility] [S72.401A])    Providers: Toni Ruiz MD Responsible Provider: Micheal Roberto IV, MD    Anesthesia Type: General ASA Status: 3          Anesthesia Type: General    Pau Phase I: Pau Score: 9    Pau Phase II:        Anesthesia Post Evaluation    Patient location during evaluation: PACU  Patient participation: complete - patient participated  Level of consciousness: awake  Airway patency: patent  Nausea & Vomiting: no nausea  Complications: no  Cardiovascular status: blood pressure returned to baseline  Respiratory status: acceptable  Hydration status: euvolemic

## 2022-11-22 NOTE — PROGRESS NOTES
Pt resting in bed. Pt asking for Tylenol. Pt made aware of when tylenol was given. No s/sx of distress noted at this time. Will monitor.

## 2022-11-22 NOTE — PROGRESS NOTES
Patient reports pain of 8/10 in R Finger    Gave 10 mg roxicodone po    Patient reports nausea    Gave 4 mg zofran iv    Patient reports headache and requested tylenol    Gave 650 mg tylenol po.

## 2022-11-22 NOTE — PROGRESS NOTES
Hospitalist Progress Note   Admit Date:  2022 11:07 AM   Name:  Elsa Leo   Age:  38 y.o. Sex:  female  :  1980   MRN:  [id number]   Room:  [facility]/    Reason(s) for Admission: Finger infection [L08.9]  Finger wound, simple, open, initial encounter [facility] Course & Interval History:   Ms. Sunshine Dixon is a 37 y/o female with a h/o polysubstance abuse and PTSD who was admitted to our service on  with cellulitis of the R index finger. No recent trauma or injury to the area. Labs unremarkable on admission. The area was lanced by the ER team with purulent material noted. X-ray showed soft tissue swelling without foreign body or other concerning findings. She was started on vancomycin and ceftriaxone. Orthopedic Surgery was consulted. Cultures are pending. Subjective/24hr Events (22): Patient seen at bedside, resting in bed comfortably. She is status post I&D done on . Patient reports of adequate pain control. Patient is scheduled for repeat I&D on . Patient will be n.p.o. after midnight. No reported fever, nausea, vomiting, chest pain, shortness of breath or palpitation or urinary symptoms. ROS:  10 point review of system is negative except for what mentioned above    Assessment & Plan:   # Cellulitis of R index finger   -:  Status post I&D in ER, wound culture positive for heavy MRSA. Status post I&D on  done by Dr. Danyel Vasquez. Repeat I&D scheduled for , patient to be n.p.o. after midnight. Pain control with oxycodone and Dilaudid. fluconazole to treat yeast infection. Blood cultures negative to date. Vancomycin stopped on , started on doxycycline 100 mg p.o. twice daily. #Severe protein calorie malnutrition  :  Ordered for high calorie and protein supplement with each meal.  BMI 16.9. # PTSD   - Con't home medications    # H/o polysubstance abuse     Discharge Planning: Home when able.   Diet:  ADULT DIET; Regular  ADULT ORAL NUTRITION SUPPLEMENT; Breakfast, Lunch, Dinner; Standard High Calorie/High Protein Oral Supplement  DVT PPx:   Code status: Full Code    I spent 36 minutes of time caring for this patient on the unit nearby or at bedside, and more than 50 percent was spent on coordination of care activities, and/or patient/family counseling regarding status and plan of care. Hospital Problems             Last Modified POA    * (Principal) Finger wound, simple, open, initial encounter 11/17/2022 Yes    Severe protein-calorie malnutrition [place]: less than 60% of standard weight) ([facility] 11/19/2022 Yes    Chronic post-traumatic stress disorder (PTSD) 11/17/2022 Yes    Stimulant use disorder 11/17/2022 Yes   Objective:   Patient Vitals for the past 24 hrs:   Temp Pulse Resp BP SpO2   11/22/22 0804 97.9 °F (36.6 °C) 82 18 (!) 90/58 97 %   11/22/22 0624 -- -- 18 -- --   11/22/22 0541 -- -- 18 -- --   11/22/22 0511 -- -- 17 -- --   11/22/22 0419 99 °F (37.2 °C) 85 18 106/74 97 %   11/21/22 2358 98.5 °F (36.9 °C) 86 16 101/63 93 %   11/21/22 2237 -- -- 16 -- --   11/21/22 2206 -- -- -- (!) 109/58 --   11/21/22 2036 -- -- 17 -- --   11/21/22 2001 98.2 °F (36.8 °C) 94 16 102/74 92 %   11/21/22 1912 97.8 °F (36.6 °C) 88 16 111/63 91 %   11/21/22 1907 -- 86 14 114/62 95 %   11/21/22 1902 -- 87 16 (!) 114/59 95 %   11/21/22 1857 -- 90 14 (!) 113/59 93 %   11/21/22 1852 -- 89 16 113/60 93 %   11/21/22 1847 -- 86 16 127/61 100 %   11/21/22 1843 -- 87 14 (!) 126/58 100 %   11/21/22 1838 -- 86 14 (!) 118/55 100 %   11/21/22 1837 100 °F (37.8 °C) 85 16 (!) 118/55 100 %   11/21/22 1736 99.7 °F (37.6 °C) 97 20 (!) 112/59 96 %   11/21/22 1443 98.1 °F (36.7 °C) 89 18 95/66 99 %   11/21/22 1115 98.2 °F (36.8 °C) 88 18 95/64 95 %         Estimated body mass index is 16.92 kg/m² as calculated from the following:    Height as of this encounter: 5' 7\" (1.702 m). Weight as of this encounter: 108 lb (49 kg).     Intake/Output Summary (Last 24 hours) at 11/22/2022 0826  Last data filed at 11/21/2022 1824  Gross per 24 hour   Intake 500 ml   Output 20 ml   Net 480 ml           Physical Exam:   Blood pressure (!) 90/58, pulse 82, temperature 97.9 °F (36.6 °C), temperature source Oral, resp. rate 18, height 5' 7\" (1.702 m), weight 108 lb (49 kg), SpO2 97 %. General:    Alert, awake, NAD  Head:  Normocephalic, atraumatic  Eyes:  Sclerae appear normal. Pupils equally round. ENT:  Nares appear normal, no drainage. Moist oral mucosa  Neck:  No restricted ROM. Trachea midline. CV:   RRR. No m/r/g. No jugular venous distension. Lungs:   CTAB. No wheezing, rhonchi, or rales. Respirations even, unlabored. Abdomen: Bowel sounds present. Soft, nontender, nondistended. Extremities: No cyanosis or clubbing. Right upper extremity with surgical dressing. No drainage or bleeding noted  Skin:     No rashes and normal coloration. Warm and dry.     Neuro:  GCS 15, cranial nerves intact, no motor or sensory deficit  Psych:  AOx3, mood and affect appropriate    I have reviewed ordered lab tests and independently visualized imaging below:    Recent Labs:  Recent Results (from the past 48 hour(s))   Pregnancy, Urine    Collection Time: 11/21/22 10:48 AM   Result Value Ref Range    HCG(Urine) Pregnancy Test Negative NEG     Urinalysis with Reflex to Culture    Collection Time: 11/21/22 10:48 AM    Specimen: Urine   Result Value Ref Range    Color, UA YELLOW/STRAW      Appearance CLEAR      Specific Gravity, UA 1.010 1.001 - 1.023      pH, Urine 7.5 5.0 - 9.0      Protein, UA Negative NEG mg/dL    Glucose, UA Negative mg/dL    Ketones, Urine Negative NEG mg/dL    Bilirubin Urine Negative NEG      Blood, Urine SMALL (A) NEG      Urobilinogen, Urine 1.0 0.2 - 1.0 EU/dL    Nitrite, Urine Negative NEG      Leukocyte Esterase, Urine Negative NEG      Urine Culture if Indicated CULTURE NOT INDICATED BY UA RESULT      WBC, UA 0-4 U4 /hpf    RBC, UA 20-50 (A) U5 /hpf BACTERIA, URINE Negative NEG /hpf    Epithelial Cells UA 0-5 U5 /hpf    Casts 0-2 U2 /lpf    Mucus, UA 0 0 /lpf   Vancomycin Level, Random    Collection Time: 11/22/22  1:51 AM   Result Value Ref Range    Vancomycin Rm 11.6 UG/ML   Creatinine and GFR    Collection Time: 11/22/22  1:51 AM   Result Value Ref Range    Creatinine 0.80 0.6 - 1.0 MG/DL    Est, Glom Filt Rate >60 >60 ml/min/1.73m2           Other Studies:  XR HAND RIGHT (MIN 3 VIEWS)    Result Date: 11/17/2022  Right HAND RADIOGRAPHS INDICATION: Indexed her pain and swelling COMPARISON: None available TECHNIQUE: 3 views of the right hand were obtained. FINDINGS: Severe soft tissue swelling throughout the index finger. No fracture, malalignment, or bone destruction evident. No soft tissue gas or radiopaque foreign body. Severe soft tissue swelling of the index finger without acute underlying osteoarticular process evident by x-ray. No foreign body. XR CHEST PORTABLE    Result Date: 11/17/2022  CHEST X-RAY, 1 VIEW INDICATION: Sepsis COMPARISON: None available TECHNIQUE: Single AP view of the chest was obtained. FINDINGS: Lungs and pleural spaces: Normal. No pneumothorax or pleural effusion. Cardiomediastinal silhouette: Normal. Osseous structures: Normal.     No radiographic evidence of acute cardiopulmonary disease.        Current Meds:  Current Facility-Administered Medications   Medication Dose Route Frequency    lactated ringers infusion   IntraVENous Continuous    miconazole (MICOTIN) 2 % vaginal cream 1 applicator  1 applicator Vaginal Nightly    phenazopyridine (PYRIDIUM) tablet 95 mg  95 mg Oral TID WC    vancomycin (VANCOCIN) 1,000 mg in sodium chloride 0.9 % 250 mL IVPB ([id number])  1,000 mg IntraVENous Q12H    oxyCODONE (ROXICODONE) immediate release tablet 10 mg  10 mg Oral Q4H PRN    ibuprofen (ADVIL;MOTRIN) tablet 800 mg  800 mg Oral Q8H PRN    HYDROmorphone HCl PF (DILAUDID) injection 1 mg  1 mg IntraVENous Q4H PRN    pantoprazole (PROTONIX) tablet 40 mg  40 mg Oral QAM AC    fluconazole (DIFLUCAN) tablet 150 mg  150 mg Oral Daily    lidocaine 1 % injection 5 mL  5 mL IntraDERmal Once    sodium chloride flush 0.9 % injection 5-40 mL  5-40 mL IntraVENous 2 times per day    sodium chloride flush 0.9 % injection 5-40 mL  5-40 mL IntraVENous PRN    0.9 % sodium chloride infusion   IntraVENous PRN    ondansetron (ZOFRAN-ODT) disintegrating tablet 4 mg  4 mg Oral Q8H PRN    Or    ondansetron (ZOFRAN) injection 4 mg  4 mg IntraVENous Q6H PRN    polyethylene glycol (GLYCOLAX) packet 17 g  17 g Oral Daily PRN    acetaminophen (TYLENOL) tablet 650 mg  650 mg Oral Q6H PRN    Or    acetaminophen (TYLENOL) suppository 650 mg  650 mg Rectal Q6H PRN    benztropine (COGENTIN) tablet 0.5 mg  0.5 mg Oral q8h    prazosin (MINIPRESS) capsule 2 mg  2 mg Oral Nightly    OLANZapine (ZYPREXA) tablet 7.5 mg  7.5 mg Oral Nightly       Signed:  Allen Jhaveri MD    Part of this note may have been written by using a voice dictation software. The note has been proof read but may still contain some grammatical/other typographical errors.

## 2022-11-22 NOTE — PROGRESS NOTES
Orthopedic Update:     Per Dr. Laura Corado the orthopedic plan is for repeat I & D of the right index finger tomorrow in the OR. NPO after midnight.

## 2022-11-22 NOTE — PROGRESS NOTES
TRANSFER - IN REPORT:    Verbal report received from [address] [place] [address] on [address] [facility]  being received from PACU for routine progression of patient care      Report consisted of patient's Situation, Background, Assessment and   Recommendations(SBAR). Information from the following report(s) Nurse Handoff Report, Surgery Report, MAR, and Recent Results was reviewed with the receiving nurse. Opportunity for questions and clarification was provided.

## 2022-11-22 NOTE — PROGRESS NOTES
Pt resting in bed. No s/sx of distress noted at this time. Pt made aware of NPO after MD for procedure.

## 2022-11-22 NOTE — PROGRESS NOTES
Miralax 1  pk po given for constipation and Dilaudid 1 mg IV given for complaints of right hand pain 6/10 will monitor.

## 2022-11-22 NOTE — PERIOP NOTE
8th floor staff informed of patient's procedure on 11/23 with Dr. Dong Grimm. OR time of 0730 with pre-op arrival of 0515.

## 2022-11-22 NOTE — PROGRESS NOTES
Tylenol 650 mg 2 tabs po given for complaints of right hand pain 8/10 and Zofran 4 mg IV given for nausea. Will monitor.

## 2022-11-22 NOTE — PROGRESS NOTES
Patient returned to unit from PACU on room air via hospital bed. Respirations present and unlabored. A&Ox4. LR running at 125 ml/hr. R Hand dressing C/D/I. No needs at this time. Call bell within reach and encouraged to call with needs.

## 2022-11-23 ENCOUNTER — ANESTHESIA (OUTPATIENT)
Dept: SURGERY | Age: 42
DRG: 853 | End: 2022-11-23
Payer: MEDICARE

## 2022-11-23 PROCEDURE — 7100000001 HC PACU RECOVERY - ADDTL 15 MIN: Performed by: ORTHOPAEDIC SURGERY

## 2022-11-23 PROCEDURE — 6360000002 HC RX W HCPCS: Performed by: HOSPITALIST

## 2022-11-23 PROCEDURE — 3600000002 HC SURGERY LEVEL 2 BASE: Performed by: ORTHOPAEDIC SURGERY

## 2022-11-23 PROCEDURE — 2500000003 HC RX 250 WO HCPCS

## 2022-11-23 PROCEDURE — 2580000003 HC RX 258: Performed by: ORTHOPAEDIC SURGERY

## 2022-11-23 PROCEDURE — 6370000000 HC RX 637 (ALT 250 FOR IP): Performed by: ORTHOPAEDIC SURGERY

## 2022-11-23 PROCEDURE — 3700000001 HC ADD 15 MINUTES (ANESTHESIA): Performed by: ORTHOPAEDIC SURGERY

## 2022-11-23 PROCEDURE — 7100000000 HC PACU RECOVERY - FIRST 15 MIN: Performed by: ORTHOPAEDIC SURGERY

## 2022-11-23 PROCEDURE — 3700000000 HC ANESTHESIA ATTENDED CARE: Performed by: ORTHOPAEDIC SURGERY

## 2022-11-23 PROCEDURE — 6360000002 HC RX W HCPCS

## 2022-11-23 PROCEDURE — 6360000002 HC RX W HCPCS: Performed by: ORTHOPAEDIC SURGERY

## 2022-11-23 PROCEDURE — 6370000000 HC RX 637 (ALT 250 FOR IP): Performed by: ANESTHESIOLOGY

## 2022-11-23 PROCEDURE — 6370000000 HC RX 637 (ALT 250 FOR IP): Performed by: HOSPITALIST

## 2022-11-23 PROCEDURE — 1100000000 HC RM PRIVATE

## 2022-11-23 PROCEDURE — 3600000012 HC SURGERY LEVEL 2 ADDTL 15MIN: Performed by: ORTHOPAEDIC SURGERY

## 2022-11-23 PROCEDURE — 6360000002 HC RX W HCPCS: Performed by: ANESTHESIOLOGY

## 2022-11-23 PROCEDURE — 2580000003 HC RX 258: Performed by: ANESTHESIOLOGY

## 2022-11-23 PROCEDURE — 0JBJ0ZZ EXCISION OF RIGHT HAND SUBCUTANEOUS TISSUE AND FASCIA, OPEN APPROACH: ICD-10-PCS | Performed by: ORTHOPAEDIC SURGERY

## 2022-11-23 PROCEDURE — 2709999900 HC NON-CHARGEABLE SUPPLY: Performed by: ORTHOPAEDIC SURGERY

## 2022-11-23 PROCEDURE — 6370000000 HC RX 637 (ALT 250 FOR IP): Performed by: FAMILY MEDICINE

## 2022-11-23 PROCEDURE — 2580000003 HC RX 258

## 2022-11-23 PROCEDURE — 11042 DBRDMT SUBQ TIS 1ST 20SQCM/<: CPT | Performed by: ORTHOPAEDIC SURGERY

## 2022-11-23 RX ORDER — SODIUM CHLORIDE 9 MG/ML
INJECTION, SOLUTION INTRAVENOUS PRN
Status: DISCONTINUED | OUTPATIENT
Start: 2022-11-23 | End: 2022-11-23 | Stop reason: HOSPADM

## 2022-11-23 RX ORDER — DIMETHICONE, CAMPHOR (SYNTHETIC), MENTHOL, AND PHENOL 1.1; .5; .625; .5 G/100G; G/100G; G/100G; G/100G
OINTMENT TOPICAL PRN
Status: DISCONTINUED | OUTPATIENT
Start: 2022-11-23 | End: 2022-11-24 | Stop reason: HOSPADM

## 2022-11-23 RX ORDER — DEXTROSE MONOHYDRATE 100 MG/ML
INJECTION, SOLUTION INTRAVENOUS CONTINUOUS PRN
Status: DISCONTINUED | OUTPATIENT
Start: 2022-11-23 | End: 2022-11-23 | Stop reason: HOSPADM

## 2022-11-23 RX ORDER — LIDOCAINE HYDROCHLORIDE 20 MG/ML
INJECTION, SOLUTION EPIDURAL; INFILTRATION; INTRACAUDAL; PERINEURAL PRN
Status: DISCONTINUED | OUTPATIENT
Start: 2022-11-23 | End: 2022-11-23 | Stop reason: SDUPTHER

## 2022-11-23 RX ORDER — PROPOFOL 10 MG/ML
INJECTION, EMULSION INTRAVENOUS PRN
Status: DISCONTINUED | OUTPATIENT
Start: 2022-11-23 | End: 2022-11-23 | Stop reason: SDUPTHER

## 2022-11-23 RX ORDER — SODIUM CHLORIDE, SODIUM LACTATE, POTASSIUM CHLORIDE, CALCIUM CHLORIDE 600; 310; 30; 20 MG/100ML; MG/100ML; MG/100ML; MG/100ML
INJECTION, SOLUTION INTRAVENOUS CONTINUOUS
Status: DISCONTINUED | OUTPATIENT
Start: 2022-11-23 | End: 2022-11-23

## 2022-11-23 RX ORDER — PROCHLORPERAZINE EDISYLATE 5 MG/ML
5 INJECTION INTRAMUSCULAR; INTRAVENOUS
Status: DISCONTINUED | OUTPATIENT
Start: 2022-11-23 | End: 2022-11-23 | Stop reason: HOSPADM

## 2022-11-23 RX ORDER — ONDANSETRON 2 MG/ML
4 INJECTION INTRAMUSCULAR; INTRAVENOUS
Status: COMPLETED | OUTPATIENT
Start: 2022-11-23 | End: 2022-11-23

## 2022-11-23 RX ORDER — ACETAMINOPHEN 500 MG
1000 TABLET ORAL ONCE
Status: COMPLETED | OUTPATIENT
Start: 2022-11-23 | End: 2022-11-23

## 2022-11-23 RX ORDER — DIPHENHYDRAMINE HYDROCHLORIDE 50 MG/ML
12.5 INJECTION INTRAMUSCULAR; INTRAVENOUS
Status: DISCONTINUED | OUTPATIENT
Start: 2022-11-23 | End: 2022-11-23 | Stop reason: HOSPADM

## 2022-11-23 RX ORDER — MIDAZOLAM HYDROCHLORIDE 2 MG/2ML
2 INJECTION, SOLUTION INTRAMUSCULAR; INTRAVENOUS
Status: DISCONTINUED | OUTPATIENT
Start: 2022-11-23 | End: 2022-11-23 | Stop reason: HOSPADM

## 2022-11-23 RX ORDER — SODIUM CHLORIDE 0.9 % (FLUSH) 0.9 %
5-40 SYRINGE (ML) INJECTION EVERY 12 HOURS SCHEDULED
Status: DISCONTINUED | OUTPATIENT
Start: 2022-11-23 | End: 2022-11-23 | Stop reason: HOSPADM

## 2022-11-23 RX ORDER — SODIUM CHLORIDE 0.9 % (FLUSH) 0.9 %
5-40 SYRINGE (ML) INJECTION PRN
Status: DISCONTINUED | OUTPATIENT
Start: 2022-11-23 | End: 2022-11-23 | Stop reason: HOSPADM

## 2022-11-23 RX ORDER — OXYCODONE HYDROCHLORIDE 5 MG/1
5 TABLET ORAL
Status: COMPLETED | OUTPATIENT
Start: 2022-11-23 | End: 2022-11-23

## 2022-11-23 RX ORDER — HYDROMORPHONE HYDROCHLORIDE 2 MG/ML
0.5 INJECTION, SOLUTION INTRAMUSCULAR; INTRAVENOUS; SUBCUTANEOUS EVERY 5 MIN PRN
Status: DISCONTINUED | OUTPATIENT
Start: 2022-11-23 | End: 2022-11-23 | Stop reason: HOSPADM

## 2022-11-23 RX ORDER — ONDANSETRON 2 MG/ML
INJECTION INTRAMUSCULAR; INTRAVENOUS PRN
Status: DISCONTINUED | OUTPATIENT
Start: 2022-11-23 | End: 2022-11-23 | Stop reason: SDUPTHER

## 2022-11-23 RX ORDER — SODIUM CHLORIDE, SODIUM LACTATE, POTASSIUM CHLORIDE, CALCIUM CHLORIDE 600; 310; 30; 20 MG/100ML; MG/100ML; MG/100ML; MG/100ML
INJECTION, SOLUTION INTRAVENOUS CONTINUOUS
Status: DISCONTINUED | OUTPATIENT
Start: 2022-11-23 | End: 2022-11-23 | Stop reason: HOSPADM

## 2022-11-23 RX ORDER — EPHEDRINE SULFATE/0.9% NACL/PF 50 MG/5 ML
SYRINGE (ML) INTRAVENOUS PRN
Status: DISCONTINUED | OUTPATIENT
Start: 2022-11-23 | End: 2022-11-23 | Stop reason: SDUPTHER

## 2022-11-23 RX ORDER — LIDOCAINE HYDROCHLORIDE 10 MG/ML
1 INJECTION, SOLUTION INFILTRATION; PERINEURAL
Status: DISCONTINUED | OUTPATIENT
Start: 2022-11-23 | End: 2022-11-23 | Stop reason: HOSPADM

## 2022-11-23 RX ADMIN — MICONAZOLE NITRATE 1 APPLICATOR: 20 CREAM VAGINAL at 20:54

## 2022-11-23 RX ADMIN — BENZTROPINE MESYLATE 0.5 MG: 1 TABLET ORAL at 20:52

## 2022-11-23 RX ADMIN — SODIUM CHLORIDE, POTASSIUM CHLORIDE, SODIUM LACTATE AND CALCIUM CHLORIDE: 600; 310; 30; 20 INJECTION, SOLUTION INTRAVENOUS at 06:16

## 2022-11-23 RX ADMIN — PHENAZOPYRIDINE HYDROCHLORIDE 95 MG: 95 TABLET ORAL at 09:49

## 2022-11-23 RX ADMIN — BENZTROPINE MESYLATE 0.5 MG: 1 TABLET ORAL at 05:01

## 2022-11-23 RX ADMIN — PHENAZOPYRIDINE HYDROCHLORIDE 95 MG: 95 TABLET ORAL at 17:19

## 2022-11-23 RX ADMIN — PHENYLEPHRINE HYDROCHLORIDE 200 MCG: 10 INJECTION INTRAVENOUS at 07:21

## 2022-11-23 RX ADMIN — HYDROMORPHONE HYDROCHLORIDE 1 MG: 1 INJECTION, SOLUTION INTRAMUSCULAR; INTRAVENOUS; SUBCUTANEOUS at 09:47

## 2022-11-23 RX ADMIN — PHENYLEPHRINE HYDROCHLORIDE 200 MCG: 10 INJECTION INTRAVENOUS at 07:26

## 2022-11-23 RX ADMIN — OXYCODONE 10 MG: 5 TABLET ORAL at 20:52

## 2022-11-23 RX ADMIN — LIDOCAINE HYDROCHLORIDE 40 MG: 20 INJECTION, SOLUTION EPIDURAL; INFILTRATION; INTRACAUDAL; PERINEURAL at 07:10

## 2022-11-23 RX ADMIN — DOXYCYCLINE HYCLATE 100 MG: 100 CAPSULE ORAL at 09:47

## 2022-11-23 RX ADMIN — BENZTROPINE MESYLATE 0.5 MG: 1 TABLET ORAL at 12:54

## 2022-11-23 RX ADMIN — PHENYLEPHRINE HYDROCHLORIDE 150 MCG: 10 INJECTION INTRAVENOUS at 07:17

## 2022-11-23 RX ADMIN — OXYCODONE 10 MG: 5 TABLET ORAL at 12:54

## 2022-11-23 RX ADMIN — ACETAMINOPHEN 1000 MG: 500 TABLET ORAL at 06:16

## 2022-11-23 RX ADMIN — POLYETHYLENE GLYCOL 3350 17 G: 17 POWDER, FOR SOLUTION ORAL at 12:54

## 2022-11-23 RX ADMIN — OLANZAPINE 7.5 MG: 5 TABLET, FILM COATED ORAL at 20:52

## 2022-11-23 RX ADMIN — ONDANSETRON 4 MG: 2 INJECTION INTRAMUSCULAR; INTRAVENOUS at 21:01

## 2022-11-23 RX ADMIN — PROPOFOL 150 MG: 10 INJECTION, EMULSION INTRAVENOUS at 07:10

## 2022-11-23 RX ADMIN — PHENYLEPHRINE HYDROCHLORIDE 200 MCG: 10 INJECTION INTRAVENOUS at 07:24

## 2022-11-23 RX ADMIN — OXYCODONE 5 MG: 5 TABLET ORAL at 08:32

## 2022-11-23 RX ADMIN — HYDROMORPHONE HYDROCHLORIDE 1 MG: 1 INJECTION, SOLUTION INTRAMUSCULAR; INTRAVENOUS; SUBCUTANEOUS at 15:11

## 2022-11-23 RX ADMIN — OXYCODONE 10 MG: 5 TABLET ORAL at 00:52

## 2022-11-23 RX ADMIN — HYDROMORPHONE HYDROCHLORIDE 1 MG: 1 INJECTION, SOLUTION INTRAMUSCULAR; INTRAVENOUS; SUBCUTANEOUS at 05:35

## 2022-11-23 RX ADMIN — FLUCONAZOLE 150 MG: 100 TABLET ORAL at 09:47

## 2022-11-23 RX ADMIN — ONDANSETRON 4 MG: 2 INJECTION INTRAMUSCULAR; INTRAVENOUS at 08:53

## 2022-11-23 RX ADMIN — ONDANSETRON 4 MG: 2 INJECTION INTRAMUSCULAR; INTRAVENOUS at 07:40

## 2022-11-23 RX ADMIN — SODIUM CHLORIDE, PRESERVATIVE FREE 10 ML: 5 INJECTION INTRAVENOUS at 09:49

## 2022-11-23 RX ADMIN — OXYCODONE 10 MG: 5 TABLET ORAL at 17:19

## 2022-11-23 RX ADMIN — PRAZOSIN HYDROCHLORIDE 2 MG: 1 CAPSULE ORAL at 20:52

## 2022-11-23 RX ADMIN — Medication 5 MG: at 07:26

## 2022-11-23 RX ADMIN — DOXYCYCLINE HYCLATE 100 MG: 100 CAPSULE ORAL at 20:52

## 2022-11-23 ASSESSMENT — PAIN SCALES - GENERAL
PAINLEVEL_OUTOF10: 4
PAINLEVEL_OUTOF10: 7
PAINLEVEL_OUTOF10: 7
PAINLEVEL_OUTOF10: 6
PAINLEVEL_OUTOF10: 5
PAINLEVEL_OUTOF10: 3
PAINLEVEL_OUTOF10: 6
PAINLEVEL_OUTOF10: 4
PAINLEVEL_OUTOF10: 6
PAINLEVEL_OUTOF10: 7

## 2022-11-23 ASSESSMENT — PAIN DESCRIPTION - LOCATION
LOCATION: HAND
LOCATION: FINGER (COMMENT WHICH ONE);HAND
LOCATION: HAND;FINGER (COMMENT WHICH ONE)
LOCATION: HAND
LOCATION: HAND;FINGER (COMMENT WHICH ONE)

## 2022-11-23 ASSESSMENT — PAIN DESCRIPTION - ORIENTATION
ORIENTATION: RIGHT

## 2022-11-23 ASSESSMENT — PAIN DESCRIPTION - DESCRIPTORS
DESCRIPTORS: ACHING
DESCRIPTORS: SQUEEZING
DESCRIPTORS: ACHING
DESCRIPTORS: ACHING;POUNDING
DESCRIPTORS: SQUEEZING

## 2022-11-23 ASSESSMENT — PAIN - FUNCTIONAL ASSESSMENT
PAIN_FUNCTIONAL_ASSESSMENT: 0-10
PAIN_FUNCTIONAL_ASSESSMENT: ACTIVITIES ARE NOT PREVENTED

## 2022-11-23 NOTE — PERIOP NOTE
TRANSFER - OUT REPORT:    Verbal report given to Elias Mattson RN on [employer]  being transferred to [address] [place], [place] [address] for routine post-op       Report consisted of patients Situation, Background, Assessment and   Recommendations(SBAR). Information from the following report(s) Adult Overview, Surgery Report, Intake/Output, and MAR was reviewed with the receiving nurse. Lines:   Peripheral IV 62/78/88 Left Cephalic (Active)   Site Assessment Clean, dry & intact 11/23/22 0803   Line Status Infusing 11/23/22 0803   Line Care Connections checked and tightened 11/23/22 0600   Phlebitis Assessment No symptoms 11/23/22 0803   Infiltration Assessment 0 11/23/22 0803   Alcohol Cap Used No 11/23/22 0600   Dressing Status Clean, dry & intact 11/23/22 0803   Dressing Type Transparent 11/23/22 0803   Dressing Intervention Other (Comment) 11/21/22 1145        Opportunity for questions and clarification was provided. Patient transported with:   O2 @ 2 liters    VTE prophylaxis orders have been written for [employer].

## 2022-11-23 NOTE — PROGRESS NOTES
Pt complaints of 8/10 pain in the right index finger. 1 mg dilaudid administered at 2248. No signs of distress. Care plan ongoing. See MAR for details.

## 2022-11-23 NOTE — PROGRESS NOTES
Hospitalist Progress Note   Admit Date:  2022 11:07 AM   Name:  Brice Garcia   Age:  38 y.o. Sex:  female  :  1980   MRN:  [id number]   Room:  M[facility]/PL    Reason(s) for Admission: Finger infection [L08.9]  Finger wound, simple, open, initial encounter [facility] Course & Interval History:   Ms. Adriana Acosta is a 37 y/o female with a h/o polysubstance abuse and PTSD who was admitted to our service on  with cellulitis of the R index finger. No recent trauma or injury to the area. Labs unremarkable on admission. The area was lanced by the ER team with purulent material noted. X-ray showed soft tissue swelling without foreign body or other concerning findings. She was started on vancomycin and ceftriaxone. Orthopedic Surgery was consulted. Cultures are pending. Subjective/24hr Events (22): Patient seen at bedside, resting in bed comfortably. She is status post second I&D done this morning by Dr. Jenn Tolbert. Patient reports feeling better, pain is optimally controlled currently. First I&D done on  by  Patient is alert and awake, AOx3. Denies any chest pain, palpitations, shortness of breath, nausea vomiting or diarrhea. No other overnight events reported by nursing staff. ROS:  10 point review of system is negative except for what mentioned above    Assessment & Plan:   # Cellulitis of R index finger   -:  Status post I&D in ER, wound culture positive for heavy MRSA. Status post I&D on  done by Dr. Jenn Tolbert. Status post repeat I&D done by Dr. Jenn Tolbert this morning. Plan for outpatient follow-up with Ortho next week, no plan for dressing change until then. Pain control with oxycodone and Dilaudid. fluconazole to treat yeast infection. Blood cultures negative to date. Vancomycin stopped on , continue doxycycline 100 mg p.o. twice daily.     #Severe protein calorie malnutrition  :  Ordered for high calorie and protein supplement with each meal.  BMI 16.9. # PTSD   - Con't home medications    # H/o polysubstance abuse     Discharge Planning: Potential discharge to home on 11/24. Diet:  ADULT DIET; Regular  ADULT ORAL NUTRITION SUPPLEMENT; Breakfast, Lunch, Dinner; Standard High Calorie/High Protein Oral Supplement  DVT PPx:   Code status: Full Code    I spent 36 minutes of time caring for this patient on the unit nearby or at bedside, and more than 50 percent was spent on coordination of care activities, and/or patient/family counseling regarding status and plan of care. Hospital Problems             Last Modified POA    * (Principal) Finger wound, simple, open, initial encounter 11/17/2022 Yes    Severe protein-calorie malnutrition Keon Ambrocio: less than 60% of standard weight) ([facility] [address] 11/19/2022 Yes    Chronic post-traumatic stress disorder (PTSD) 11/17/2022 Yes    Stimulant use disorder 11/17/2022 Yes    Abscess of right index finger 11/22/2022 Unknown    Overview Signed 11/22/2022  9:33 AM by DENICE Vinson     Added automatically from request for surgery [id number]        Objective:   Patient Vitals for the past 24 hrs:   Temp Pulse Resp BP SpO2   11/23/22 0806 -- 78 14 115/64 98 %   11/23/22 0803 99.8 °F (37.7 °C) 77 12 117/67 97 %   11/23/22 0556 98.4 °F (36.9 °C) 78 12 108/66 96 %   11/23/22 0306 98.2 °F (36.8 °C) 89 18 115/70 99 %   11/23/22 0052 -- -- 17 -- --   11/22/22 2248 -- -- 18 -- --   11/22/22 2216 98.1 °F (36.7 °C) 84 16 112/69 98 %   11/22/22 2043 -- -- 18 -- --   11/22/22 1939 97.6 °F (36.4 °C) 86 16 108/73 98 %   11/22/22 1546 97.5 °F (36.4 °C) 84 18 108/67 97 %   11/22/22 1129 97.9 °F (36.6 °C) 84 18 116/73 98 %         Estimated body mass index is 16.92 kg/m² as calculated from the following:    Height as of this encounter: 5' 7\" (1.702 m). Weight as of this encounter: 108 lb (49 kg).     Intake/Output Summary (Last 24 hours) at 11/23/2022 0812  Last data filed at 11/23/2022 0755  Gross per 24 hour   Intake 1320 ml Output 15 ml   Net 1305 ml           Physical Exam:   Blood pressure 115/64, pulse 78, temperature 99.8 °F (37.7 °C), temperature source Temporal, resp. rate 14, height 5' 7\" (1.702 m), weight 108 lb (49 kg), SpO2 98 %. General:    Alert, awake, NAD  Head:  Normocephalic, atraumatic  Eyes:  Sclerae appear normal. Pupils equally round. ENT:  Nares appear normal, no drainage. Moist oral mucosa  Neck:  No restricted ROM. Trachea midline. CV:   RRR. No m/r/g. No jugular venous distension. Lungs:   CTAB. No wheezing, rhonchi, or rales. Respirations even, unlabored. Abdomen: Bowel sounds present. Soft, nontender, nondistended. Extremities: No cyanosis or clubbing. Right upper extremity with surgical dressing. No drainage or bleeding noted  Skin:     No rashes and normal coloration. Warm and dry.     Neuro:  GCS 15, cranial nerves intact, no motor or sensory deficit  Psych:  AOx3, mood and affect appropriate    I have reviewed ordered lab tests and independently visualized imaging below:    Recent Labs:  Recent Results (from the past 48 hour(s))   Pregnancy, Urine    Collection Time: 11/21/22 10:48 AM   Result Value Ref Range    HCG(Urine) Pregnancy Test Negative NEG     Urinalysis with Reflex to Culture    Collection Time: 11/21/22 10:48 AM    Specimen: Urine   Result Value Ref Range    Color, UA YELLOW/STRAW      Appearance CLEAR      Specific Gravity, UA 1.010 1.001 - 1.023      pH, Urine 7.5 5.0 - 9.0      Protein, UA Negative NEG mg/dL    Glucose, UA Negative mg/dL    Ketones, Urine Negative NEG mg/dL    Bilirubin Urine Negative NEG      Blood, Urine SMALL (A) NEG      Urobilinogen, Urine 1.0 0.2 - 1.0 EU/dL    Nitrite, Urine Negative NEG      Leukocyte Esterase, Urine Negative NEG      Urine Culture if Indicated CULTURE NOT INDICATED BY UA RESULT      WBC, UA 0-4 U4 /hpf    RBC, UA 20-50 (A) U5 /hpf    BACTERIA, URINE Negative NEG /hpf    Epithelial Cells UA 0-5 U5 /hpf    Casts 0-2 U2 /lpf    Mucus, UA 0 0 /lpf   Culture, Body Fluid    Collection Time: 11/21/22  6:23 PM    Specimen: Abscess   Result Value Ref Range    Special Requests NO SPECIAL REQUESTS      Gram stain TOO NUMEROUS TO COUNT WBCS PER OIF      Gram stain MODERATE GRAM POSITIVE COCCI      Culture HEAVY STAPHYLOCOCCUS AUREUS SENSITIVITY TO FOLLOW (A)     Vancomycin Level, Random    Collection Time: 11/22/22  1:51 AM   Result Value Ref Range    Vancomycin Rm 11.6 UG/ML   Creatinine and GFR    Collection Time: 11/22/22  1:51 AM   Result Value Ref Range    Creatinine 0.80 0.6 - 1.0 MG/DL    Est, Glom Filt Rate >60 >60 ml/min/1.73m2           Other Studies:  XR HAND RIGHT (MIN 3 VIEWS)    Result Date: 11/17/2022  Right HAND RADIOGRAPHS INDICATION: Indexed her pain and swelling COMPARISON: None available TECHNIQUE: 3 views of the right hand were obtained. FINDINGS: Severe soft tissue swelling throughout the index finger. No fracture, malalignment, or bone destruction evident. No soft tissue gas or radiopaque foreign body. Severe soft tissue swelling of the index finger without acute underlying osteoarticular process evident by x-ray. No foreign body. XR CHEST PORTABLE    Result Date: 11/17/2022  CHEST X-RAY, 1 VIEW INDICATION: Sepsis COMPARISON: None available TECHNIQUE: Single AP view of the chest was obtained. FINDINGS: Lungs and pleural spaces: Normal. No pneumothorax or pleural effusion. Cardiomediastinal silhouette: Normal. Osseous structures: Normal.     No radiographic evidence of acute cardiopulmonary disease.        Current Meds:  Current Facility-Administered Medications   Medication Dose Route Frequency    lactated ringers infusion   IntraVENous Continuous    sodium chloride flush 0.9 % injection 5-40 mL  5-40 mL IntraVENous 2 times per day    sodium chloride flush 0.9 % injection 5-40 mL  5-40 mL IntraVENous PRN    0.9 % sodium chloride infusion   IntraVENous PRN    HYDROmorphone HCl PF (DILAUDID) injection 0.5 mg  0.5 mg IntraVENous Q5 Min PRN    oxyCODONE (ROXICODONE) immediate release tablet 5 mg  5 mg Oral Once PRN    ondansetron (ZOFRAN) injection 4 mg  4 mg IntraVENous Once PRN    prochlorperazine (COMPAZINE) injection 5 mg  5 mg IntraVENous Once PRN    diphenhydrAMINE (BENADRYL) injection 12.5 mg  12.5 mg IntraVENous Once PRN    glucose chewable tablet 16 g  4 tablet Oral PRN    dextrose bolus 10% 125 mL  125 mL IntraVENous PRN    Or    dextrose bolus 10% 250 mL  250 mL IntraVENous PRN    glucagon (rDNA) injection 1 mg  1 mg SubCUTAneous PRN    dextrose 10 % infusion   IntraVENous Continuous PRN    doxycycline hyclate (VIBRAMYCIN) capsule 100 mg  100 mg Oral 2 times per day    lactated ringers infusion   IntraVENous Continuous    miconazole (MICOTIN) 2 % vaginal cream 1 applicator  1 applicator Vaginal Nightly    phenazopyridine (PYRIDIUM) tablet 95 mg  95 mg Oral TID WC    oxyCODONE (ROXICODONE) immediate release tablet 10 mg  10 mg Oral Q4H PRN    ibuprofen (ADVIL;MOTRIN) tablet 800 mg  800 mg Oral Q8H PRN    HYDROmorphone HCl PF (DILAUDID) injection 1 mg  1 mg IntraVENous Q4H PRN    pantoprazole (PROTONIX) tablet 40 mg  40 mg Oral QAM AC    fluconazole (DIFLUCAN) tablet 150 mg  150 mg Oral Daily    lidocaine 1 % injection 5 mL  5 mL IntraDERmal Once    sodium chloride flush 0.9 % injection 5-40 mL  5-40 mL IntraVENous 2 times per day    sodium chloride flush 0.9 % injection 5-40 mL  5-40 mL IntraVENous PRN    0.9 % sodium chloride infusion   IntraVENous PRN    ondansetron (ZOFRAN-ODT) disintegrating tablet 4 mg  4 mg Oral Q8H PRN    Or    ondansetron (ZOFRAN) injection 4 mg  4 mg IntraVENous Q6H PRN    polyethylene glycol (GLYCOLAX) packet 17 g  17 g Oral Daily PRN    acetaminophen (TYLENOL) tablet 650 mg  650 mg Oral Q6H PRN    Or    acetaminophen (TYLENOL) suppository 650 mg  650 mg Rectal Q6H PRN    benztropine (COGENTIN) tablet 0.5 mg  0.5 mg Oral q8h    prazosin (MINIPRESS) capsule 2 mg  2 mg Oral Nightly    OLANZapine (ZYPREXA) tablet 7.5 mg  7.5 mg Oral Nightly       Signed:  Uday Valdez MD    Part of this note may have been written by using a voice dictation software. The note has been proof read but may still contain some grammatical/other typographical errors.

## 2022-11-23 NOTE — PROGRESS NOTES
Pt returns to room from PACU. Pt alert oriented times 3 at at this time. Pt complaints of pain 7/10 to right hand,dressing to right hand clean,d dry, intact. No distress noted call light in reach, will monitor.

## 2022-11-23 NOTE — PROGRESS NOTES
Report given to [facility], RN. Pt being transported to preop bay via pt bed. Pt NPO after midnight. Dilaudid 1 mg IV administered at 0535. No signs of distress.

## 2022-11-23 NOTE — OP NOTE
Operative Report    Patient: Marilee Salvador MRN: [id number]  SSN: [id number]    YOB: 1980  Age: 38 y.o. Sex: female       Date of Surgery: November 23, 2022     History:  Marilee Salvador is a 38 y.o. female who had a significant infection in her right index finger. She had a lot of tissue necrosis with there being a significant amount of skin fluff and purulence throughout the subcutaneous tissue mostly on the volar aspect of the right index finger. She did not appear to have any obvious fluid in the flexor tendon sheath proximal to the A1 pulley as I did explore that previously. I did think just because of the nature of her infection and the significant mount of tissue destruction that would be wise to return for a second look debridement as I was very concerned about the viability of the finger. I did talk to the patient about the fact that I was concerned that if she had exposed tendon and had trouble healing over this then she may be at risk even for losing the finger or needing some further surgical procedures. She seemed understand all this and she seemed understand the need for repeat debridement of her right index finger. I talked to the patient and/or their representative and explained the exact nature the procedure. I also went through a detailed list of the material risks associated with  the procedure which included risk of bleeding, infection, injury to nearby structures, worsening the situation, as well as the risks associate with anesthesia and finally death. Also talked with him regarding the benefits and alternatives to the procedure.     Preoperative Diagnosis: Abscess of right index finger [L02.511]     Postoperative Diagnosis:   Abscess of right index finger      Surgeon(s) and Role:     * Hilario Palomo MD - Primary    Anesthesia: Choice     Procedure: Repeat excisional debridement of right index finger wounds with debridement of skin subcutaneous tissue and 20 cm²    Procedure in Detail: After the successful duction of general anesthetic the right upper extremity was prepped and draped in usual sterile fashion. I did use loupe magnification for both this procedure as well as the prior 1 and I then just began the process of using my tenotomy scissors to debride the necrotic tissue from the subcutaneous area and around the tendon sheath itself volarly. I was able to identify both the radial and ulnar digital nerves and these appear to be intact and after identifying these I was able to stay safely away from these and continue to debride any subcutaneous tissue and tissue that was around the tendon sheath that was obviously nonviable. This appeared to be doing much better than it had been doing previously with no evidence of any active purulence. After I had debrided all of the necrotic tissue the finger definitely looked better than it did 48 hours prior thoroughly irrigated with normal saline using a bulb syringe I then placed bacitracin over the open wounds of the index finger which mostly were volarly and then Xeroform and then placed the patient in a intrinsic plus splint. Estimated Blood Loss: 30 cc    Tourniquet Time:   Total Tourniquet Time Documented:  Arm  (Right) - 9 minutes  Total: Arm  (Right) - 9 minutes        Implants: * No implants in log *            Specimens: * No specimens in log *        Drains: None                Complications: None    Counts: Sponge and needle counts were correct times two.     Signed By:  Fatimah Yao MD     November 23, 2022

## 2022-11-23 NOTE — PROGRESS NOTES
Pt resting in bed, now asking for more pain meds. No distress noted at this time call light in reach, will monitor.

## 2022-11-23 NOTE — PROGRESS NOTES
Pt complaints of 10/10 pain in right index finger. Oxycodone 10 mg tablet administered at 2043. No signs of distress. Care plan ongoing. See MAR for details.

## 2022-11-23 NOTE — ANESTHESIA POSTPROCEDURE EVALUATION
Department of Anesthesiology  Postprocedure Note    Patient: Nicki Barker  MRN: [id number]  YOB: 1980  Date of evaluation: 11/23/2022      Procedure Summary     Date: 11/23/22 Room / Location: [facility] MAIN OR 08 / [facility] MAIN OR    Anesthesia Start: 1442 Anesthesia Stop: 3057    Procedure: Right idnex finger irrigation and debridment (Right: Fingers) Diagnosis:       Abscess of right index finger      (Abscess of right index finger [L02.511])    Providers: Aftab Vazquez MD Responsible Provider: Lencho Hayes MD    Anesthesia Type: general ASA Status: 3          Anesthesia Type: No value filed. Pau Phase I: Pau Score: 9    Pau Phase II:        Anesthesia Post Evaluation    Patient location during evaluation: PACU  Patient participation: complete - patient participated  Level of consciousness: awake and alert  Airway patency: patent  Nausea: well controlled. Complications: no  Cardiovascular status: acceptable.   Respiratory status: acceptable  Hydration status: stable

## 2022-11-23 NOTE — ANESTHESIA PRE PROCEDURE
Department of Anesthesiology  Preprocedure Note       Name:  Sandro Rose   Age:  38 y.o.  :  1980                                          MRN:  [id number]         Date:  2022      Surgeon: Livier Geronimo):  Jose Roberson MD    Procedure: Procedure(s):  Right idnex finger irrigation and debridment    Medications prior to admission:   Prior to Admission medications    Medication Sig Start Date End Date Taking? Authorizing Provider   benztropine (COGENTIN) 0.5 MG tablet Take 0.5 mg by mouth 2 times daily 10/14/21   Ar Automatic Reconciliation   benztropine (COGENTIN) 1 MG tablet Take 1 mg by mouth 3 times daily as needed 20  Ar Automatic Reconciliation   divalproex (DEPAKOTE) 500 MG DR tablet TAKE 2 TABLETS BY MOUTH AT BEDTIME 10/14/21   Ar Automatic Reconciliation   levETIRAcetam (KEPPRA) 500 MG tablet Take 500 mg by mouth 2 times daily    Ar Automatic Reconciliation   magnesium citrate (CITROMA) SOLN Take 296 mLs by mouth daily as needed    Ar Automatic Reconciliation   mirtazapine (REMERON) 15 MG tablet Take 15 mg by mouth    Ar Automatic Reconciliation   ondansetron (ZOFRAN-ODT) 4 MG disintegrating tablet Take 4 mg by mouth every 8 hours as needed 3/15/22   Ar Automatic Reconciliation   prazosin (MINIPRESS) 2 MG capsule TAKE 1 CAPSULE BY MOUTH AT BEDTIME 20   Ar Automatic Reconciliation   QUEtiapine (SEROQUEL) 100 MG tablet Take 100 mg by mouth 20  Ar Automatic Reconciliation   QUEtiapine (SEROQUEL) 200 MG tablet TAKE 2 TABLETS BY MOUTH AT BEDTIME 10/14/21   Ar Automatic Reconciliation   senna (SENOKOT) 8.6 MG tablet Take 1 tablet by mouth 2 times daily    Ar Automatic Reconciliation   traZODone (DESYREL) 50 MG tablet Take 50 mg by mouth    Ar Automatic Reconciliation       Current medications:    No current facility-administered medications for this visit. No current outpatient medications on file.      Facility-Administered Medications Ordered in Other Visits Medication Dose Route Frequency Provider Last Rate Last Admin    doxycycline hyclate (VIBRAMYCIN) capsule 100 mg  100 mg Oral 2 times per day Pura Watkins MD   100 mg at 11/22/22 0847    lactated ringers infusion   IntraVENous Continuous Hilda Bashir MD   Stopped at 11/22/22 0348    miconazole (MICOTIN) 2 % vaginal cream 1 applicator  1 applicator Vaginal Nightly Pura Watkins MD   1 applicator at 75/70/98 1601    phenazopyridine (PYRIDIUM) tablet 95 mg  95 mg Oral TID WC Pura Watkins MD   95 mg at 11/22/22 1539    oxyCODONE (ROXICODONE) immediate release tablet 10 mg  10 mg Oral Q4H PRN Pura Watkins MD   10 mg at 11/22/22 1141    ibuprofen (ADVIL;MOTRIN) tablet 800 mg  800 mg Oral Q8H PRN Pura Watkins MD   800 mg at 11/20/22 1747    HYDROmorphone HCl PF (DILAUDID) injection 1 mg  1 mg IntraVENous Q4H PRN Pura Watkins MD   1 mg at 11/22/22 1538    pantoprazole (PROTONIX) tablet 40 mg  40 mg Oral QAM AC Pura Watkins MD   40 mg at 11/21/22 0533    fluconazole (DIFLUCAN) tablet 150 mg  150 mg Oral Daily Pura Watkins MD   150 mg at 11/22/22 0848    lidocaine 1 % injection 5 mL  5 mL IntraDERmal Once Merlinda Rily, [place]        sodium chloride flush 0.9 % injection 5-40 mL  5-40 mL IntraVENous 2 times per day Lenoria Born, DO   10 mL at 11/22/22 0850    sodium chloride flush 0.9 % injection 5-40 mL  5-40 mL IntraVENous PRN Lenoria Born, DO   10 mL at 11/19/22 1324    0.9 % sodium chloride infusion   IntraVENous PRN Lenoria Born, DO 20 mL/hr at 11/18/22 0312 New Bag at 11/18/22 0312    ondansetron (ZOFRAN-ODT) disintegrating tablet 4 mg  4 mg Oral Q8H PRN Lenoria Born, DO   4 mg at 11/20/22 1748    Or    ondansetron (ZOFRAN) injection 4 mg  4 mg IntraVENous Q6H PRN Lenoria Born, DO   4 mg at 11/22/22 1253    polyethylene glycol (GLYCOLAX) packet 17 g  17 g Oral Daily PRN Lenoria Born, DO   17 g at 11/22/22 0469    acetaminophen (TYLENOL) tablet 650 mg  650 mg Oral Q6H PRN Paresh Nine, DO   650 mg at 11/22/22 1254    Or    acetaminophen (TYLENOL) suppository 650 mg  650 mg Rectal Q6H PRN Paresh Nine, DO        benztropine (COGENTIN) tablet 0.5 mg  0.5 mg Oral q8h Paresh Nine, DO   0.5 mg at 11/22/22 1254    prazosin (MINIPRESS) capsule 2 mg  2 mg Oral Nightly Paresh Nine, DO   2 mg at 11/21/22 2206    OLANZapine (ZYPREXA) tablet 7.5 mg  7.5 mg Oral Nightly Paresh Nine, DO   7.5 mg at 11/21/22 2206       Allergies:  No Known Allergies    Problem List:    Patient Active Problem List   Diagnosis Code    Chronic post-traumatic stress disorder (PTSD) F43.12    Stimulant use disorder F15.90    Alcohol withdrawal syndrome with complication (HCC) L20.990    Back pain M54.9    Common bile duct dilatation K83.8    Abdominal pain R10.9    Alcohol use disorder, severe, dependence ([address] F10.20    Opioid use disorder, severe, in early remission ([address] F11.21    Psychosis ([address] F29    Serotonin syndrome G25.79    Polysubstance abuse ([address] F19.10    Gall bladder disease K82.9    Elevated LFTs R79.89    Schizoaffective disorder, depressive type ([address] F25.1    Vaginal candida B37.31    Extrapyramidal symptom R29.818    Altered mental status R41.82    Finger wound, simple, open, initial encounter S61.209A    Severe protein-calorie malnutrition Reshma Levels: less than 60% of standard weight) ([address] E43    Abscess of right index finger L02.511       Past Medical History:        Diagnosis Date    Alcohol withdrawal syndrome with complication ([address] 24/73/4254    Anxiety and depression     anxiety, depression    Back pain     problems with L4-5 vertebra    Common bile duct dilatation 6/12/2013    Depression     Gall bladder disease 6/12/2013    Psychiatric disorder     Vaginal candida 11/6/2017       Past Surgical History:        Procedure Laterality Date    CHOLECYSTECTOMY      HAND SURGERY Right 11/21/2022    RIGHT INDEX FINGER DEBRIDEMENT INCISION AND DRAINAGE performed by Osvaldo Galdamez MD at [facility] MAIN OR    ORTHOPEDIC SURGERY  2007    left foot, tonail excision       Social History:    Social History     Tobacco Use    Smoking status: Never    Smokeless tobacco: Not on file   Substance Use Topics    Alcohol use: No                                Counseling given: Not Answered      Vital Signs (Current): There were no vitals filed for this visit. BP Readings from Last 3 Encounters:   11/22/22 108/73       NPO Status:                                                                                 BMI:   Wt Readings from Last 3 Encounters:   11/17/22 108 lb (49 kg)     There is no height or weight on file to calculate BMI.    CBC:   Lab Results   Component Value Date/Time    WBC 10.7 11/19/2022 03:23 AM    RBC 3.73 11/19/2022 03:23 AM    HGB 10.5 11/19/2022 03:23 AM    HCT 33.1 11/19/2022 03:23 AM    MCV 88.7 11/19/2022 03:23 AM    RDW 14.2 11/19/2022 03:23 AM     11/19/2022 03:23 AM       CMP:   Lab Results   Component Value Date/Time     11/18/2022 03:41 AM    K 3.8 11/18/2022 03:41 AM     11/18/2022 03:41 AM    CO2 28 11/18/2022 03:41 AM    BUN 10 11/18/2022 03:41 AM    CREATININE 0.80 11/22/2022 01:51 AM    GFRAA >60 12/04/2021 05:38 PM    AGRATIO 1.0 12/04/2021 05:38 PM    LABGLOM >60 11/22/2022 01:51 AM    GLUCOSE 79 11/18/2022 03:41 AM    PROT 7.4 11/17/2022 11:05 AM    CALCIUM 8.6 11/18/2022 03:41 AM    BILITOT 0.3 11/17/2022 11:05 AM    ALKPHOS 83 11/17/2022 11:05 AM    ALKPHOS 77 12/04/2021 05:38 PM    AST 18 11/17/2022 11:05 AM    ALT 41 11/17/2022 11:05 AM       POC Tests: No results for input(s): POCGLU, POCNA, POCK, POCCL, POCBUN, POCHEMO, POCHCT in the last 72 hours.     Coags: No results found for: PROTIME, INR, APTT    HCG (If Applicable):   Lab Results   Component Value Date    PREGTESTUR Negative 11/21/2022        ABGs: No results found for: PHART, PO2ART, EYP7YIB, DQK2NJK, BEART, C2MRAGZN     Type & Screen (If Applicable):  No results found for: LABABO, LABRH    Drug/Infectious Status (If Applicable):  No results found for: HIV, HEPCAB    COVID-19 Screening (If Applicable): No results found for: COVID19        Anesthesia Evaluation  Patient summary reviewed and Nursing notes reviewed no history of anesthetic complications:   Airway: Mallampati: II  TM distance: >3 FB   Neck ROM: full  Mouth opening: > = 3 FB   Dental:    (+) poor dentition      Pulmonary: breath sounds clear to auscultation  (+) current smoker (vapor products)          Patient did not smoke on day of surgery. Cardiovascular:  Exercise tolerance: good (>4 METS),           Rhythm: regular  Rate: normal                    Neuro/Psych:   (+) psychiatric history (PTSD, schizoaffective):depression/anxiety              ROS comment: H/o EtOH abuse with withdrawal GI/Hepatic/Renal:             Endo/Other:                      ROS comment: UDS only positive for opiates, no benzo, cocaine, or THC    H/o drug use per EMR, underweight Abdominal:             Vascular: Other Findings:             Anesthesia Plan      general     ASA 3     (LMA)  Induction: intravenous. MIPS: Postoperative opioids intended. Anesthetic plan and risks discussed with patient.                         Gladys Krause MD   11/22/2022

## 2022-11-23 NOTE — PROGRESS NOTES
Pt resting in bed. Alert and oriented x 3 at this time. Respirations even and unlabored on RA. Dressing on right hand clean, dry, and intact. Bed is locked and low. Pt encouraged to use call light for assistance. No signs of distress. Pt denies any needs at this time. Report received from [address] [place] [address], [address] [place] [address]. Care plan to be continued at this time.

## 2022-11-23 NOTE — ANESTHESIA PROCEDURE NOTES
Airway  Date/Time: 11/23/2022 7:12 AM  Urgency: elective    Airway not difficult    General Information and Staff    Patient location during procedure: OR  Resident/CRNA: GILBERTO Núñez - CRNA  Performed: resident/CRNA     Indications and Patient Condition  Indications for airway management: anesthesia  Spontaneous Ventilation: absent  Sedation level: deep  Preoxygenated: yes  Patient position: sniffing  Mask difficulty assessment: vent by bag mask    Final Airway Details  Final airway type: supraglottic airway      Successful airway: oropharyngeal  Size 4     Number of attempts at approach: 1  Ventilation between attempts: supraglottic airway  Number of other approaches attempted: 0

## 2022-11-23 NOTE — PROGRESS NOTES
TRANSFER - IN REPORT:    Verbal report received from [facility] on [employer]  being received from PACU for routine progression of patient care      Report consisted of patient's Situation, Background, Assessment and   Recommendations(SBAR). Information from the following report(s) Nurse Handoff Report was reviewed with the receiving nurse. Opportunity for questions and clarification was provided. Assessment completed upon patient's arrival to unit and care assumed.

## 2022-11-23 NOTE — PROGRESS NOTES
Pt complaints of 6/10 pain in the  right index finger. Oxycodone 10 mg tablet administered at 0052. No signs of distress. Care plan ongoing. See MAR for details.

## 2022-11-23 NOTE — INTERVAL H&P NOTE
Update History & Physical    The Patient's History and Physical of 11/17/2022 was reviewed with the patient and I examined the patient. There was no change. The surgical site was confirmed by the patient and me. Plan:  The risk, benefits, expected outcome, and alternative to the recommended procedure have been discussed with the patient. Patient understands and wants to proceed with repeat debridement of right index finger.     Electronically signed by Ange Daigle MD on 11/23/2022 at 6:40 AM

## 2022-11-24 VITALS
RESPIRATION RATE: 18 BRPM | HEART RATE: 87 BPM | WEIGHT: 108 LBS | HEIGHT: 67 IN | DIASTOLIC BLOOD PRESSURE: 83 MMHG | OXYGEN SATURATION: 94 % | BODY MASS INDEX: 16.95 KG/M2 | TEMPERATURE: 97.7 F | SYSTOLIC BLOOD PRESSURE: 121 MMHG

## 2022-11-24 LAB
ACID FAST STN SPEC: NEGATIVE
BACTERIA SPEC CULT: ABNORMAL
BACTERIA SPEC CULT: ABNORMAL
GRAM STN SPEC: ABNORMAL
GRAM STN SPEC: ABNORMAL
MYCOBACTERIUM SPEC QL CULT: NORMAL
SERVICE CMNT-IMP: ABNORMAL
SPECIMEN PREPARATION: NORMAL
SPECIMEN SOURCE: NORMAL

## 2022-11-24 PROCEDURE — 6360000002 HC RX W HCPCS: Performed by: ORTHOPAEDIC SURGERY

## 2022-11-24 PROCEDURE — 6370000000 HC RX 637 (ALT 250 FOR IP): Performed by: ORTHOPAEDIC SURGERY

## 2022-11-24 PROCEDURE — 2580000003 HC RX 258: Performed by: ORTHOPAEDIC SURGERY

## 2022-11-24 RX ORDER — QUETIAPINE FUMARATE 100 MG/1
100 TABLET, FILM COATED ORAL DAILY
Qty: 60 TABLET | Refills: 3 | Status: SHIPPED | OUTPATIENT
Start: 2022-11-24

## 2022-11-24 RX ORDER — QUETIAPINE FUMARATE 100 MG/1
100 TABLET, FILM COATED ORAL 2 TIMES DAILY
Qty: 60 TABLET | Refills: 3 | Status: SHIPPED | OUTPATIENT
Start: 2022-11-24

## 2022-11-24 RX ORDER — PANTOPRAZOLE SODIUM 40 MG/1
40 TABLET, DELAYED RELEASE ORAL
Qty: 30 TABLET | Refills: 3 | Status: SHIPPED | OUTPATIENT
Start: 2022-11-25

## 2022-11-24 RX ORDER — OXYCODONE HYDROCHLORIDE 10 MG/1
10 TABLET ORAL EVERY 6 HOURS PRN
Qty: 20 TABLET | Refills: 0 | Status: SHIPPED | OUTPATIENT
Start: 2022-11-24 | End: 2022-11-29

## 2022-11-24 RX ORDER — FLUCONAZOLE 150 MG/1
150 TABLET ORAL DAILY
Qty: 3 TABLET | Refills: 0 | Status: SHIPPED | OUTPATIENT
Start: 2022-11-24 | End: 2022-11-27

## 2022-11-24 RX ORDER — OLANZAPINE 7.5 MG/1
7.5 TABLET ORAL NIGHTLY
Qty: 30 TABLET | Refills: 3 | Status: SHIPPED | OUTPATIENT
Start: 2022-11-24

## 2022-11-24 RX ORDER — IBUPROFEN 800 MG/1
800 TABLET ORAL EVERY 8 HOURS PRN
Qty: 120 TABLET | Refills: 3 | Status: SHIPPED | OUTPATIENT
Start: 2022-11-24

## 2022-11-24 RX ORDER — PHENAZOPYRIDINE HYDROCHLORIDE 95 MG/1
95 TABLET ORAL
Qty: 15 TABLET | Refills: 0 | Status: SHIPPED | OUTPATIENT
Start: 2022-11-24 | End: 2022-11-29

## 2022-11-24 RX ORDER — DOXYCYCLINE HYCLATE 100 MG/1
100 CAPSULE ORAL EVERY 12 HOURS SCHEDULED
Qty: 24 CAPSULE | Refills: 0 | Status: SHIPPED | OUTPATIENT
Start: 2022-11-24 | End: 2022-12-06

## 2022-11-24 RX ADMIN — PANTOPRAZOLE SODIUM 40 MG: 40 TABLET, DELAYED RELEASE ORAL at 05:58

## 2022-11-24 RX ADMIN — HYDROMORPHONE HYDROCHLORIDE 1 MG: 1 INJECTION, SOLUTION INTRAMUSCULAR; INTRAVENOUS; SUBCUTANEOUS at 03:27

## 2022-11-24 RX ADMIN — OXYCODONE 10 MG: 5 TABLET ORAL at 11:57

## 2022-11-24 RX ADMIN — HYDROMORPHONE HYDROCHLORIDE 1 MG: 1 INJECTION, SOLUTION INTRAMUSCULAR; INTRAVENOUS; SUBCUTANEOUS at 09:42

## 2022-11-24 RX ADMIN — DOXYCYCLINE HYCLATE 100 MG: 100 CAPSULE ORAL at 09:40

## 2022-11-24 RX ADMIN — PHENAZOPYRIDINE HYDROCHLORIDE 95 MG: 95 TABLET ORAL at 09:40

## 2022-11-24 RX ADMIN — SODIUM CHLORIDE, PRESERVATIVE FREE 10 ML: 5 INJECTION INTRAVENOUS at 09:41

## 2022-11-24 RX ADMIN — FLUCONAZOLE 150 MG: 100 TABLET ORAL at 09:40

## 2022-11-24 RX ADMIN — OXYCODONE 10 MG: 5 TABLET ORAL at 05:59

## 2022-11-24 RX ADMIN — BENZTROPINE MESYLATE 0.5 MG: 1 TABLET ORAL at 05:58

## 2022-11-24 RX ADMIN — BENZTROPINE MESYLATE 0.5 MG: 1 TABLET ORAL at 11:57

## 2022-11-24 RX ADMIN — SODIUM CHLORIDE, PRESERVATIVE FREE 10 ML: 5 INJECTION INTRAVENOUS at 06:01

## 2022-11-24 ASSESSMENT — PAIN DESCRIPTION - LOCATION
LOCATION: FINGER (COMMENT WHICH ONE)
LOCATION: HAND
LOCATION: FINGER (COMMENT WHICH ONE);HAND
LOCATION: FINGER (COMMENT WHICH ONE);HAND

## 2022-11-24 ASSESSMENT — PAIN SCALES - GENERAL
PAINLEVEL_OUTOF10: 7
PAINLEVEL_OUTOF10: 7
PAINLEVEL_OUTOF10: 0
PAINLEVEL_OUTOF10: 8
PAINLEVEL_OUTOF10: 5

## 2022-11-24 ASSESSMENT — PAIN DESCRIPTION - DESCRIPTORS
DESCRIPTORS: ACHING

## 2022-11-24 ASSESSMENT — PAIN DESCRIPTION - ORIENTATION
ORIENTATION: RIGHT

## 2022-11-24 NOTE — DISCHARGE SUMMARY
Hospitalist Discharge Summary   Admit Date:  2022 11:07 AM   DC Note date: 2022  Name:  Ness Valdes   Age:  38 y.o. Sex:  female  :  1980   MRN:  [id number]   Room:  [facility]  PCP:  Lucero Simmons MD    Presenting Complaint: Finger Pain     Initial Admission Diagnosis: Finger infection [L08.9]  Finger wound, simple, open, initial encounter [S61.209A]     Problem List for this Hospitalization (present on admission):    Principal Problem:    Finger wound, simple, open, initial encounter  Active Problems:    Severe protein-calorie malnutrition Bravojeanette Merritt: less than 60% of standard weight) ([address]    Chronic post-traumatic stress disorder (PTSD)    Stimulant use disorder    Abscess of right index finger  Resolved Problems:    * No resolved hospital problems. [facility] Course:  Ms. Teetee Paz is a 39 y/o female with a h/o polysubstance abuse and PTSD who was admitted to our service on  with cellulitis of the R index finger. No recent trauma or injury to the area. Labs unremarkable on admission. The area was lanced by the ER team with purulent material noted. X-ray showed soft tissue swelling without foreign body or other concerning findings. She was started on vancomycin and ceftriaxone. Orthopedic Surgery was consulted. Cultures are pending. # Cellulitis of R index finger              -:  Status post I&D in ER, wound culture positive for heavy MRSA. Status post I&D on  done by Dr. Kim Muller. Status post repeat I&D done by Dr. Kim Muller on 22. Plan for outpatient follow-up with Ortho next week, no plan for dressing change until then. Pain control with oxycodone  fluconazole to treat yeast infection, 3 more days after discharge  Blood cultures negative to date.   Vancomycin stopped on , Doxycycline 100 mg p.o. twice daily x 12 days after discharge     #Severe protein calorie malnutrition  :  Ordered for high calorie and protein supplement with each meal.  BMI 16.9.     # PTSD              - Con't home medications    Disposition: Patient is medically cleared and hemodynamically stable for discharge today. Will follow with orthopedics as outpatient next week. Diet: ADULT DIET; Regular  ADULT ORAL NUTRITION SUPPLEMENT; Breakfast, Lunch, Dinner; Standard High Calorie/High Protein Oral Supplement  Code Status: Full Code    Follow Ups:  Follow-up with Dr. Korin Wright from orthopedics in next 1 week after discharge. Time spent in patient discharge and coordination 36 minutes. Plan was discussed with patient. All questions answered. Patient was stable at time of discharge. Instructions given to call a physician or return if any concerns. Discharge Medication List as of 11/24/2022 11:50 AM        START taking these medications    Details   oxyCODONE (OXY-IR) 10 MG immediate release tablet Take 1 tablet by mouth every 6 hours as needed for Pain for up to 5 days. , Disp-20 tablet, R-0Print      ibuprofen (ADVIL;MOTRIN) 800 MG tablet Take 1 tablet by mouth every 8 hours as needed for Fever or Pain, Disp-120 tablet, R-3Print      fluconazole (DIFLUCAN) 150 MG tablet Take 1 tablet by mouth daily for 3 days, Disp-3 tablet, R-0Print      OLANZapine (ZYPREXA) 7.5 MG tablet Take 1 tablet by mouth nightly, Disp-30 tablet, R-3Print      phenazopyridine (PYRIDIUM) 95 MG tablet Take 1 tablet by mouth 3 times daily (with meals) for 5 days, Disp-15 tablet, R-0Print      doxycycline hyclate (VIBRAMYCIN) 100 MG capsule Take 1 capsule by mouth every 12 hours for 24 doses, Disp-24 capsule, R-0Print      pantoprazole (PROTONIX) 40 MG tablet Take 1 tablet by mouth every morning (before breakfast), Disp-30 tablet, R-3Print      miconazole (MICOTIN) 2 % vaginal cream Place vaginally nightly., Disp-45 g, R-1Print           CONTINUE these medications which have CHANGED    Details   !! QUEtiapine (SEROQUEL) 100 MG tablet Take 1 tablet by mouth daily, Disp-60 tablet, R-3Print      !! QUEtiapine (SEROQUEL) 100 MG tablet Take 1 tablet by mouth 2 times daily, Disp-60 tablet, R-3Print       !! - Potential duplicate medications found. Please discuss with provider. CONTINUE these medications which have NOT CHANGED    Details   benztropine (COGENTIN) 1 MG tablet Take 1 mg by mouth 3 times daily as neededHistorical Med      divalproex (DEPAKOTE) 500 MG DR tablet TAKE 2 TABLETS BY MOUTH AT BEDTIMEHistorical Med      magnesium citrate (CITROMA) SOLN Take 296 mLs by mouth daily as neededHistorical Med      mirtazapine (REMERON) 15 MG tablet Take 15 mg by mouthHistorical Med      ondansetron (ZOFRAN-ODT) 4 MG disintegrating tablet Take 4 mg by mouth every 8 hours as neededHistorical Med      prazosin (MINIPRESS) 2 MG capsule TAKE 1 CAPSULE BY MOUTH AT BEDTIMEHistorical Med      senna (SENOKOT) 8.6 MG tablet Take 1 tablet by mouth 2 times dailyHistorical Med      traZODone (DESYREL) 50 MG tablet Take 50 mg by mouthHistorical Med           STOP taking these medications       benztropine (COGENTIN) 0.5 MG tablet Comments:   Reason for Stopping:         levETIRAcetam (KEPPRA) 500 MG tablet Comments:   Reason for Stopping:               Procedures done this admission:  Procedure(s):  Right idnex finger irrigation and debridment    Consults this admission:  IP CONSULT TO ORTHOPEDIC SURGERY    Echocardiogram results:  No results found for this or any previous visit. Diagnostic Imaging/Tests:   XR HAND RIGHT (MIN 3 VIEWS)    Result Date: 11/17/2022  Severe soft tissue swelling of the index finger without acute underlying osteoarticular process evident by x-ray. No foreign body. XR CHEST PORTABLE    Result Date: 11/17/2022  No radiographic evidence of acute cardiopulmonary disease.         Labs: Results:       BMP, Mg, Phos Recent Labs     11/22/22  0151   CREATININE 0.80   LABGLOM >60      CBC No results for input(s): WBC, RBC, HGB, HCT, MCV, MCH, MCHC, RDW, PLT, MPV, NRBC, SEGS, LYMPHOPCT, EOSRELPCT, MONOPCT, BASOPCT, IMMGRAN, SEGSABS, LYMPHSABS, EOSABS, MONOSABS, BASOSABS, ABSIMMGRAN in the last 72 hours. LFT No results for input(s): BILITOT, BILIDIR, ALKPHOS, AST, ALT, PROT, LABALBU, GLOB in the last 72 hours. Cardiac  No results found for: NTPROBNP, TROPHS   Coags No results found for: PROTIME, INR, APTT   A1c No results found for: LABA1C, EAG   Lipids No results found for: CHOL, LDLCALC, LABVLDL, HDL, CHOLHDLRATIO, TRIG   Thyroid  No results found for: Julia Fass     Most Recent UA Lab Results   Component Value Date/Time    COLORU YELLOW/STRAW 11/21/2022 10:48 AM    APPEARANCE CLEAR 11/21/2022 10:48 AM    SPECGRAV 1.010 11/21/2022 10:48 AM    LABPH 7.5 11/21/2022 10:48 AM    PROTEINU Negative 11/21/2022 10:48 AM    GLUCOSEU Negative 11/21/2022 10:48 AM    KETUA Negative 11/21/2022 10:48 AM    BILIRUBINUR Negative 11/21/2022 10:48 AM    BLOODU SMALL 11/21/2022 10:48 AM    UROBILINOGEN 1.0 11/21/2022 10:48 AM    NITRU Negative 11/21/2022 10:48 AM    LEUKOCYTESUR Negative 11/21/2022 10:48 AM    WBCUA 0-4 11/21/2022 10:48 AM    RBCUA 20-50 11/21/2022 10:48 AM    EPITHUA 0-5 11/21/2022 10:48 AM    BACTERIA Negative 11/21/2022 10:48 AM    LABCAST 0-2 11/21/2022 10:48 AM    MUCUS 0 11/21/2022 10:48 AM        Recent Labs     11/21/22  1823 11/17/22  1440 11/17/22  1118 11/17/22  1105   CULTURE HEAVY * Methicillin Resistant Staphylococcus aureus **  PATIENT IS A KNOWN MRSA NO ANAEROBES ISOLATED 6 DAYS  HEAVY * Methicillin Resistant Staphylococcus aureus **  RESULTS VERIFIED, PHONED TO AND READ BACK BY BENEDICTO FORTE RN ON 11/19/22 @1012, ADS NO GROWTH 5 DAYS NO GROWTH 5 DAYS       All Labs from Last 24 Hrs:  No results found for this or any previous visit (from the past 24 hour(s)). No Known Allergies    There is no immunization history on file for this patient.     Recent Vital Data:  Patient Vitals for the past 24 hrs:   Temp Pulse Resp BP SpO2   11/24/22 0734 97.3 °F (36.3 °C) 87 18 (!) 95/58 98 %   11/24/22 0559 -- -- 17 -- --   11/24/22 0327 -- -- 18 -- --   11/24/22 0300 98.2 °F (36.8 °C) 81 16 115/80 98 %   11/23/22 2254 98.3 °F (36.8 °C) 83 16 112/76 97 %   11/23/22 2052 -- -- 17 -- --   11/23/22 1929 98.4 °F (36.9 °C) 85 18 91/60 99 %   11/23/22 1500 98.1 °F (36.7 °C) 79 18 120/60 98 %   11/23/22 1042 97.9 °F (36.6 °C) 87 18 102/66 96 %   11/23/22 0952 98.6 °F (37 °C) 80 18 111/67 98 %   11/23/22 0858 -- 72 14 127/72 96 %   11/23/22 0843 -- 84 16 (!) 129/59 97 %       Oxygen Therapy  SpO2: 98 %  Pulse Oximeter Device Mode: Continuous  Pulse Oximeter Device Location: Finger  O2 Device: None (Room air)  O2 Flow Rate (L/min): 3 L/min    Estimated body mass index is 16.92 kg/m² as calculated from the following:    Height as of this encounter: 5' 7\" (1.702 m). Weight as of this encounter: 108 lb (49 kg). Intake/Output Summary (Last 24 hours) at 11/24/2022 0839  Last data filed at 11/23/2022 3218  Gross per 24 hour   Intake 120 ml   Output --   Net 120 ml         Physical Exam:    General:    Alert, awake, NAD, on room air  Head:  Normocephalic, atraumatic  Eyes:  Sclerae appear normal.  Pupils equally round. HENT:  Nares appear normal, no drainage. Moist mucous membranes  Neck:  No restricted ROM. Trachea midline  CV:   RRR. No m/r/g. No JVD  Lungs:   CTAB. No wheezing, rhonchi, or rales. Respirations even, unlabored  Abdomen:   Soft, nontender, nondistended. Extremities: Warm and dry. No cyanosis or clubbing. No edema. Right upper extremity in sling and surgical dressing. Skin:     No rashes. Normal coloration  Neuro:  CN II-XII grossly intact. Psych:  Normal mood and affect. Signed:  Federico Valderrama MD    Part of this note may have been written by using a voice dictation software. The note has been proof read but may still contain some grammatical/other typographical errors.

## 2022-11-24 NOTE — PROGRESS NOTES
Pt complaints of 8/10 pain in R index finger and R hand. 10 mg oxycodone at 0559. No signs of distress. Care plan ongoing. See MAR for details.

## 2022-11-24 NOTE — PROGRESS NOTES
Pt complaints of 6/10 pain in the R hand and index finger. 10 mg oxycodone administered at 2052. 4 mg IV zofran given to reduce nausea. No signs of distress. Care plan ongoing. See MAR for details.

## 2022-11-24 NOTE — PROGRESS NOTES
Pt sitting up in bed. Alert and oriented x 4. Respirations even and unlabored on RA. Dressing on RLE clean, dry, and intact. Bed is locked and low. Pt encouraged to use call light for assistance. No signs of distress. Pt denies any needs at this time. Report received from [address] [place] [address], [address] [place] [address]. Care plan to be continued at this time.

## 2022-11-24 NOTE — PROGRESS NOTES
Pt complaints of 7/10 pain in the R index finger and R arm. Dilaudid 1 mg administered at 0327. No signs of distress. Care plan on going. See MAR for details.

## 2022-11-24 NOTE — PROGRESS NOTES
2022         Post Op day: 1 Day Post-Op     Admit Date: 2022  Admit Diagnosis: Finger infection [L08.9]  Finger wound, simple, open, initial encounter [S61.057A]        Subjective: Patient is status-post Procedure(s) (LRB):  Right idnex finger irrigation and debridment (Right). Patient doing well. No concerns/complaints. No shortness of breath, chest pain or nausea/vomiting. Objective:   Vitals:    22 0734   BP: (!) 95/58   Pulse: 87   Resp: 18   Temp: 97.3 °F (36.3 °C)   SpO2: 98%    Temp (24hrs), Av.1 °F (36.7 °C), Min:97.3 °F (36.3 °C), Max:98.6 °F (37 °C)    Lab Results   Component Value Date/Time    HGB 10.5 2022 03:23 AM     Extremity Exam  Right Dressing Clean, dry, intact. Neuro intact and unchanged right extremity  Sensation intact to light touch  Extremity perfused  No sign of DVT     Assessment / Plan :  S/p Procedure(s) (LRB):  Right idnex finger irrigation and debridment (Right)  Continue current postoperative plan  PT/OT-Continue current weightbearing status and restrictions of involved extremities  Continue current VTE prophylaxis  DIspo-Home with oral antibiotics if stable. F/u with Dr. Kim Muller in clinic in one week.  Keep splint in place  Patient Active Problem List   Diagnosis    Chronic post-traumatic stress disorder (PTSD)    Stimulant use disorder    Alcohol withdrawal syndrome with complication (HCC)    Back pain    Common bile duct dilatation    Abdominal pain    Alcohol use disorder, severe, dependence ([address]    Opioid use disorder, severe, in early remission ([address]    Psychosis ([address]    Serotonin syndrome    Polysubstance abuse ([address]    Ce Book bladder disease    Elevated LFTs    Schizoaffective disorder, depressive type ([address]    Vaginal candida    Extrapyramidal symptom    Altered mental status    Finger wound, simple, open, initial encounter    Severe protein-calorie malnutrition [place]a: less than 60% of standard weight) (HCC)    Abscess of right index finger          Signed By: Garfield Lackey, APRN - CNP

## 2022-11-24 NOTE — PROGRESS NOTES
Pt resting in bed quietly. Alert and oriented x 4. Respirations even and unlabored on RA. Dressing on R arm clean, dry, and intact. Bed is locked and low with call light within reach. No acute events over night. No signs of distress at this time. Preparing to give report to oncoming RN.

## 2022-11-25 LAB
BACTERIA SPEC CULT: NORMAL
SERVICE CMNT-IMP: NORMAL

## 2022-12-01 ENCOUNTER — TELEPHONE (OUTPATIENT)
Dept: ORTHOPEDIC SURGERY | Age: 42
End: 2022-12-01

## 2022-12-02 NOTE — TELEPHONE ENCOUNTER
VM box full couldn't leave message. Dr. Karen Gupta wanted her dressing left in place until he saw her in the office per his last progress note.

## 2022-12-08 ENCOUNTER — TELEPHONE (OUTPATIENT)
Dept: ORTHOPEDIC SURGERY | Age: 42
End: 2022-12-08

## 2022-12-08 NOTE — TELEPHONE ENCOUNTER
Please call barber this patient [facility]/[phone]  [place]. .she missed appt

## 2022-12-22 ENCOUNTER — HOSPITAL ENCOUNTER (INPATIENT)
Age: 42
LOS: 3 days | Discharge: HOME | DRG: 603 | End: 2022-12-26
Attending: EMERGENCY MEDICINE | Admitting: INTERNAL MEDICINE
Payer: MEDICARE

## 2022-12-22 ENCOUNTER — HOSPITAL ENCOUNTER (EMERGENCY)
Age: 42
Discharge: STILL ADMITTED OR ADMINISTRATIVE | DRG: 603 | End: 2022-12-22
Attending: INTERNAL MEDICINE | Admitting: INTERNAL MEDICINE
Payer: MEDICARE

## 2022-12-22 DIAGNOSIS — L08.9 FINGER INFECTION: Primary | ICD-10-CM

## 2022-12-22 LAB
ALBUMIN SERPL-MCNC: 3.7 G/DL (ref 3.5–5)
ALBUMIN/GLOB SERPL: 1 (ref 0.4–1.6)
ALP SERPL-CCNC: 163 U/L (ref 50–136)
ALT SERPL-CCNC: 73 U/L (ref 12–65)
ANION GAP SERPL CALC-SCNC: 6 MMOL/L (ref 2–11)
AST SERPL-CCNC: 37 U/L (ref 15–37)
BASOPHILS # BLD: 0 K/UL (ref 0–0.2)
BASOPHILS NFR BLD: 0 % (ref 0–2)
BILIRUB SERPL-MCNC: 0.5 MG/DL (ref 0.2–1.1)
BILIRUB UR QL: NEGATIVE
BILIRUB UR QL: NEGATIVE
BUN SERPL-MCNC: 12 MG/DL (ref 6–23)
CALCIUM SERPL-MCNC: 9 MG/DL (ref 8.3–10.4)
CHLORIDE SERPL-SCNC: 106 MMOL/L (ref 101–110)
CO2 SERPL-SCNC: 26 MMOL/L (ref 21–32)
CREAT SERPL-MCNC: 1 MG/DL (ref 0.6–1)
DIFFERENTIAL METHOD BLD: NORMAL
EOSINOPHIL # BLD: 0 K/UL (ref 0–0.8)
EOSINOPHIL NFR BLD: 1 % (ref 0.5–7.8)
ERYTHROCYTE [DISTWIDTH] IN BLOOD BY AUTOMATED COUNT: 14.5 % (ref 11.9–14.6)
GLOBULIN SER CALC-MCNC: 3.6 G/DL (ref 2.8–4.5)
GLUCOSE SERPL-MCNC: 126 MG/DL (ref 65–100)
GLUCOSE UR QL STRIP.AUTO: NEGATIVE MG/DL
GLUCOSE UR QL STRIP.AUTO: NEGATIVE MG/DL
HCT VFR BLD AUTO: 38.4 % (ref 35.8–46.3)
HGB BLD-MCNC: 12.4 G/DL (ref 11.7–15.4)
IMM GRANULOCYTES # BLD AUTO: 0 K/UL (ref 0–0.5)
IMM GRANULOCYTES NFR BLD AUTO: 0 % (ref 0–5)
KETONES UR-MCNC: ABNORMAL MG/DL
KETONES UR-MCNC: NEGATIVE MG/DL
LEUKOCYTE ESTERASE UR QL STRIP: NEGATIVE
LEUKOCYTE ESTERASE UR QL STRIP: NEGATIVE
LYMPHOCYTES # BLD: 1.2 K/UL (ref 0.5–4.6)
LYMPHOCYTES NFR BLD: 21 % (ref 13–44)
MCH RBC QN AUTO: 28.7 PG (ref 26.1–32.9)
MCHC RBC AUTO-ENTMCNC: 32.3 G/DL (ref 31.4–35)
MCV RBC AUTO: 88.9 FL (ref 82–102)
MONOCYTES # BLD: 0.5 K/UL (ref 0.1–1.3)
MONOCYTES NFR BLD: 9 % (ref 4–12)
NEUTS SEG # BLD: 4.1 K/UL (ref 1.7–8.2)
NEUTS SEG NFR BLD: 69 % (ref 43–78)
NITRITE UR QL: NEGATIVE
NITRITE UR QL: NEGATIVE
NRBC # BLD: 0 K/UL (ref 0–0.2)
PH UR: 6 (ref 5–9)
PH UR: 6 (ref 5–9)
PLATELET # BLD AUTO: 204 K/UL (ref 150–450)
PMV BLD AUTO: 9.6 FL (ref 9.4–12.3)
POTASSIUM SERPL-SCNC: 3.1 MMOL/L (ref 3.5–5.1)
PROT SERPL-MCNC: 7.3 G/DL (ref 6.3–8.2)
PROT UR QL: 30 MG/DL
PROT UR QL: 30 MG/DL
RBC # BLD AUTO: 4.32 M/UL (ref 4.05–5.2)
RBC # UR STRIP: ABNORMAL
RBC # UR STRIP: ABNORMAL
SERVICE CMNT-IMP: ABNORMAL
SERVICE CMNT-IMP: ABNORMAL
SODIUM SERPL-SCNC: 138 MMOL/L (ref 133–143)
SP GR UR: >1.03 (ref 1–1.02)
SP GR UR: >1.03 (ref 1–1.02)
UROBILINOGEN UR QL: 0.2 EU/DL (ref 0.2–1)
UROBILINOGEN UR QL: 0.2 EU/DL (ref 0.2–1)
WBC # BLD AUTO: 5.9 K/UL (ref 4.3–11.1)

## 2022-12-22 PROCEDURE — 96374 THER/PROPH/DIAG INJ IV PUSH: CPT | Performed by: EMERGENCY MEDICINE

## 2022-12-22 PROCEDURE — 81003 URINALYSIS AUTO W/O SCOPE: CPT

## 2022-12-22 PROCEDURE — 85025 COMPLETE CBC W/AUTO DIFF WBC: CPT

## 2022-12-22 PROCEDURE — 6370000000 HC RX 637 (ALT 250 FOR IP): Performed by: NURSE PRACTITIONER

## 2022-12-22 PROCEDURE — 99285 EMERGENCY DEPT VISIT HI MDM: CPT | Performed by: EMERGENCY MEDICINE

## 2022-12-22 PROCEDURE — 80053 COMPREHEN METABOLIC PANEL: CPT

## 2022-12-22 PROCEDURE — 6360000002 HC RX W HCPCS: Performed by: EMERGENCY MEDICINE

## 2022-12-22 RX ORDER — OXYCODONE HYDROCHLORIDE 5 MG/1
5 TABLET ORAL
Status: COMPLETED | OUTPATIENT
Start: 2022-12-22 | End: 2022-12-22

## 2022-12-22 RX ORDER — LORAZEPAM 2 MG/ML
1 INJECTION INTRAMUSCULAR EVERY 6 HOURS PRN
Status: DISCONTINUED | OUTPATIENT
Start: 2022-12-22 | End: 2022-12-26 | Stop reason: HOSPADM

## 2022-12-22 RX ORDER — DOXYCYCLINE HYCLATE 100 MG/1
100 CAPSULE ORAL
Status: COMPLETED | OUTPATIENT
Start: 2022-12-22 | End: 2022-12-22

## 2022-12-22 RX ADMIN — DOXYCYCLINE HYCLATE 100 MG: 100 CAPSULE ORAL at 18:46

## 2022-12-22 RX ADMIN — Medication 1 MG: at 22:32

## 2022-12-22 RX ADMIN — OXYCODONE 5 MG: 5 TABLET ORAL at 18:54

## 2022-12-22 ASSESSMENT — ENCOUNTER SYMPTOMS: SHORTNESS OF BREATH: 0

## 2022-12-22 NOTE — ED PROVIDER NOTES
Emergency Department Provider Note                   PCP:                Sharyle Battle, MD               Age: 38 y.o. Sex: female       ICD-10-CM    1. Finger infection  L08.9           DISPOSITION Decision To Admit 12/22/2022 06:36:10 PM        MDM  Number of Diagnoses or Management Options  Finger infection: new, needed workup  Diagnosis management comments: 57-year-old female who presents emergency department today with complaint of right index finger pain. Patient appears to have had an irrigation and debridement of the right index finger on 11/23/2022 by Dr. Coty Haddad. She failed to ever follow-up after discharge from the hospital.  She states she also did not take the doxycycline that was prescribed. There was a wound culture done that did show susceptibility to tetracyclines. Spoke with NP Graham Olivares via [employer] and I am advised to have patient admitted by hospitalist and she will possibly have a washout again tomorrow. He states to start patient back on appropriate antibiotics. We will start back on doxycycline. Patient does have 2 charts, marked for merge. Notes from her previous visit is in her other chart that I have reviewed. Hospitalist consulted for admission. Discussed this with the patient and she is in agreement with this plan.        Amount and/or Complexity of Data Reviewed  Clinical lab tests: ordered and reviewed  Review and summarize past medical records: yes       Orders Placed This Encounter   Procedures    CMP    CBC with Auto Differential    Diet NPO    POCT Urine Dipstick    Saline lock IV        Medications   doxycycline hyclate (VIBRAMYCIN) capsule 100 mg (100 mg Oral Given 12/22/22 1846)   oxyCODONE (ROXICODONE) immediate release tablet 5 mg (5 mg Oral Given 12/22/22 1854)       New Prescriptions    No medications on file        Manoj Ambriz is a 38 y.o. female who presents to the Emergency Department with chief complaint of    Chief Complaint   Patient presents with    Post-op Problem      32-year-old female who presents emergency department today with complaint of right index finger pain. Patient was seen last month and had an irrigation and debridement of the right index finger by Dr. Lilly Yeager. Patient failed to ever follow-up after discharge from the hospital.  She denies any fever, chills, chest pain, abdominal pain, or shortness of breath. She reports being prescribed antibiotics but states that she never took any of the antibiotics. She never removed the bandage that was applied while she was in the hospital.  She denies any other past medical or surgical issues. The history is provided by the patient. Hand Problem  Location:  Finger  Finger location:  R index finger  Pain details:     Quality:  Aching    Radiates to:  Does not radiate    Severity:  Moderate    Timing:  Constant    Progression:  Worsening  Handedness:  Right-handed  Relieved by:  None tried  Worsened by:  Nothing  Ineffective treatments:  None tried  Associated symptoms: decreased range of motion    Associated symptoms: no fever        Review of Systems   Constitutional:  Negative for fever. Respiratory:  Negative for shortness of breath. Cardiovascular:  Negative for chest pain. Musculoskeletal:  Positive for arthralgias. All other systems reviewed and are negative. No past medical history on file. No past surgical history on file. No family history on file. Social History     Socioeconomic History    Marital status: Single         Patient has no known allergies. Previous Medications    No medications on file        Vitals signs and nursing note reviewed. Patient Vitals for the past 4 hrs:   Temp Pulse Resp BP SpO2   12/22/22 1715 97.7 °F (36.5 °C) 92 17 129/80 100 %          Physical Exam  Vitals and nursing note reviewed. Constitutional:       General: She is not in acute distress. Appearance: Normal appearance. She is well-developed.  She is not ill-appearing, toxic-appearing or diaphoretic. HENT:      Head: Normocephalic and atraumatic. Mouth/Throat:      Mouth: Mucous membranes are moist.   Eyes:      General: No scleral icterus. Extraocular Movements: Extraocular movements intact. Cardiovascular:      Rate and Rhythm: Normal rate. Pulmonary:      Effort: Pulmonary effort is normal. No respiratory distress. Abdominal:      General: Abdomen is flat. There is no distension. Musculoskeletal:      Right hand: Swelling and tenderness present. Decreased range of motion. Decreased strength. Normal sensation. Normal capillary refill. Normal pulse. Comments: Swelling and erythema to the right index finger with wound to the palmar aspect of the digit. 2 sutures intact to the palmar surface of the right hand. Patient is unable to flex or extend the right index finger   Skin:     General: Skin is warm and dry. Capillary Refill: Capillary refill takes less than 2 seconds. Neurological:      General: No focal deficit present. Mental Status: She is alert and oriented to person, place, and time.    Psychiatric:         Mood and Affect: Mood normal.         Behavior: Behavior normal.        Procedures    Results for orders placed or performed during the hospital encounter of 12/22/22   CMP   Result Value Ref Range    Sodium 138 133 - 143 mmol/L    Potassium 3.1 (L) 3.5 - 5.1 mmol/L    Chloride 106 101 - 110 mmol/L    CO2 26 21 - 32 mmol/L    Anion Gap 6 2 - 11 mmol/L    Glucose 126 (H) 65 - 100 mg/dL    BUN 12 6 - 23 MG/DL    Creatinine 1.00 0.6 - 1.0 MG/DL    Est, Glom Filt Rate >60 >60 ml/min/1.73m2    Calcium 9.0 8.3 - 10.4 MG/DL    Total Bilirubin 0.5 0.2 - 1.1 MG/DL    ALT 73 (H) 12 - 65 U/L    AST 37 15 - 37 U/L    Alk Phosphatase 163 (H) 50 - 136 U/L    Total Protein 7.3 6.3 - 8.2 g/dL    Albumin 3.7 3.5 - 5.0 g/dL    Globulin 3.6 2.8 - 4.5 g/dL    Albumin/Globulin Ratio 1.0 0.4 - 1.6     CBC with Auto Differential   Result Value Ref Range    WBC 5.9 4.3 - 11.1 K/uL    RBC 4.32 4.05 - 5.2 M/uL    Hemoglobin 12.4 11.7 - 15.4 g/dL    Hematocrit 38.4 35.8 - 46.3 %    MCV 88.9 82 - 102 FL    MCH 28.7 26.1 - 32.9 PG    MCHC 32.3 31.4 - 35.0 g/dL    RDW 14.5 11.9 - 14.6 %    Platelets 129 890 - 649 K/uL    MPV 9.6 9.4 - 12.3 FL    nRBC 0.00 0.0 - 0.2 K/uL    Differential Type AUTOMATED      Seg Neutrophils 69 43 - 78 %    Lymphocytes 21 13 - 44 %    Monocytes 9 4.0 - 12.0 %    Eosinophils % 1 0.5 - 7.8 %    Basophils 0 0.0 - 2.0 %    Immature Granulocytes 0 0.0 - 5.0 %    Segs Absolute 4.1 1.7 - 8.2 K/UL    Absolute Lymph # 1.2 0.5 - 4.6 K/UL    Absolute Mono # 0.5 0.1 - 1.3 K/UL    Absolute Eos # 0.0 0.0 - 0.8 K/UL    Basophils Absolute 0.0 0.0 - 0.2 K/UL    Absolute Immature Granulocyte 0.0 0.0 - 0.5 K/UL   Results for orders placed or performed during the hospital encounter of 12/22/22   POCT Urinalysis no Micro   Result Value Ref Range    Specific Gravity, Urine, POC >1.030 (H) 1.001 - 1.023    pH, Urine, POC 6.0 5.0 - 9.0      Protein, Urine, POC 30 (A) NEG mg/dL    Glucose, UA POC Negative NEG mg/dL    Ketones, Urine, POC TRACE (A) NEG mg/dL    Bilirubin, Urine, POC Negative NEG      Blood, UA POC LARGE (A) NEG      URINE UROBILINOGEN POC 0.2 0.2 - 1.0 EU/dL    Nitrate, Urine, POC Negative NEG      Leukocyte Est, UA POC Negative NEG      Performed by: Rey Ramos         No orders to display                       Voice dictation software was used during the making of this note. This software is not perfect and grammatical and other typographical errors may be present. This note has not been completely proofread for errors.        Ishmael Yadav, GILBERTO - [facility]  12/22/22 9026

## 2022-12-22 NOTE — ED TRIAGE NOTES
Pt arrives stating she needs a wound on her right hand checked. Pt states she had sx on her hand twice for infection on 11/23. Pt states she got locked out her house and was unable to dress her hand or take her abx. Pt states she missed her f/u her appt. Pt states worsening pain with her right hand.  Pt has foul smelling drainage from right index finger

## 2022-12-23 PROBLEM — T14.8XXA WOUND INFECTION: Status: ACTIVE | Noted: 2022-12-23

## 2022-12-23 PROBLEM — L08.9 WOUND INFECTION: Status: ACTIVE | Noted: 2022-12-23

## 2022-12-23 LAB
AMPHET UR QL SCN: POSITIVE
BARBITURATES UR QL SCN: NEGATIVE
BENZODIAZ UR QL: NEGATIVE
CANNABINOIDS UR QL SCN: NEGATIVE
COCAINE UR QL SCN: NEGATIVE
METHADONE UR QL: NEGATIVE
OPIATES UR QL: POSITIVE
PCP UR QL: NEGATIVE

## 2022-12-23 PROCEDURE — 6370000000 HC RX 637 (ALT 250 FOR IP): Performed by: INTERNAL MEDICINE

## 2022-12-23 PROCEDURE — 97530 THERAPEUTIC ACTIVITIES: CPT

## 2022-12-23 PROCEDURE — 6370000000 HC RX 637 (ALT 250 FOR IP): Performed by: HOSPITALIST

## 2022-12-23 PROCEDURE — 97166 OT EVAL MOD COMPLEX 45 MIN: CPT

## 2022-12-23 PROCEDURE — 2580000003 HC RX 258: Performed by: INTERNAL MEDICINE

## 2022-12-23 PROCEDURE — 6360000002 HC RX W HCPCS: Performed by: INTERNAL MEDICINE

## 2022-12-23 PROCEDURE — 87086 URINE CULTURE/COLONY COUNT: CPT

## 2022-12-23 PROCEDURE — 1100000000 HC RM PRIVATE

## 2022-12-23 PROCEDURE — 80307 DRUG TEST PRSMV CHEM ANLYZR: CPT

## 2022-12-23 RX ORDER — CYPROHEPTADINE HYDROCHLORIDE 4 MG/1
4 TABLET ORAL 2 TIMES DAILY
COMMUNITY

## 2022-12-23 RX ORDER — OLANZAPINE 7.5 MG/1
7.5 TABLET ORAL NIGHTLY
COMMUNITY

## 2022-12-23 RX ORDER — POLYETHYLENE GLYCOL 3350 17 G/17G
17 POWDER, FOR SOLUTION ORAL DAILY PRN
Status: DISCONTINUED | OUTPATIENT
Start: 2022-12-23 | End: 2022-12-26 | Stop reason: HOSPADM

## 2022-12-23 RX ORDER — QUETIAPINE FUMARATE 100 MG/1
200 TABLET, FILM COATED ORAL 2 TIMES DAILY
COMMUNITY

## 2022-12-23 RX ORDER — TRAMADOL HYDROCHLORIDE 50 MG/1
50 TABLET ORAL EVERY 6 HOURS PRN
Status: DISCONTINUED | OUTPATIENT
Start: 2022-12-23 | End: 2022-12-26 | Stop reason: HOSPADM

## 2022-12-23 RX ORDER — MORPHINE SULFATE 2 MG/ML
1 INJECTION, SOLUTION INTRAMUSCULAR; INTRAVENOUS EVERY 4 HOURS PRN
Status: DISCONTINUED | OUTPATIENT
Start: 2022-12-23 | End: 2022-12-26 | Stop reason: HOSPADM

## 2022-12-23 RX ORDER — POTASSIUM CHLORIDE 20 MEQ/1
40 TABLET, EXTENDED RELEASE ORAL ONCE
Status: COMPLETED | OUTPATIENT
Start: 2022-12-23 | End: 2022-12-23

## 2022-12-23 RX ORDER — ACETAMINOPHEN 650 MG/1
650 SUPPOSITORY RECTAL EVERY 6 HOURS PRN
Status: DISCONTINUED | OUTPATIENT
Start: 2022-12-23 | End: 2022-12-26 | Stop reason: HOSPADM

## 2022-12-23 RX ORDER — OLANZAPINE 5 MG/1
7.5 TABLET ORAL NIGHTLY PRN
Status: DISCONTINUED | OUTPATIENT
Start: 2022-12-23 | End: 2022-12-26 | Stop reason: HOSPADM

## 2022-12-23 RX ORDER — SODIUM CHLORIDE 9 MG/ML
INJECTION, SOLUTION INTRAVENOUS CONTINUOUS
Status: DISCONTINUED | OUTPATIENT
Start: 2022-12-23 | End: 2022-12-24

## 2022-12-23 RX ORDER — BENZTROPINE MESYLATE 1 MG/1
0.5 TABLET ORAL 2 TIMES DAILY
Status: DISCONTINUED | OUTPATIENT
Start: 2022-12-23 | End: 2022-12-26 | Stop reason: HOSPADM

## 2022-12-23 RX ORDER — TRAZODONE HYDROCHLORIDE 50 MG/1
50 TABLET ORAL NIGHTLY PRN
Status: DISCONTINUED | OUTPATIENT
Start: 2022-12-23 | End: 2022-12-26 | Stop reason: HOSPADM

## 2022-12-23 RX ORDER — ACETAMINOPHEN 325 MG/1
650 TABLET ORAL EVERY 6 HOURS PRN
Status: DISCONTINUED | OUTPATIENT
Start: 2022-12-23 | End: 2022-12-26 | Stop reason: HOSPADM

## 2022-12-23 RX ORDER — SENNA AND DOCUSATE SODIUM 50; 8.6 MG/1; MG/1
2 TABLET, FILM COATED ORAL DAILY
Status: DISCONTINUED | OUTPATIENT
Start: 2022-12-23 | End: 2022-12-26 | Stop reason: HOSPADM

## 2022-12-23 RX ORDER — PRAZOSIN HYDROCHLORIDE 1 MG/1
2 CAPSULE ORAL NIGHTLY
Status: DISCONTINUED | OUTPATIENT
Start: 2022-12-23 | End: 2022-12-26 | Stop reason: HOSPADM

## 2022-12-23 RX ORDER — TRAMADOL HYDROCHLORIDE 50 MG/1
50 TABLET ORAL EVERY 6 HOURS PRN
Status: DISCONTINUED | OUTPATIENT
Start: 2022-12-23 | End: 2022-12-23

## 2022-12-23 RX ORDER — QUETIAPINE FUMARATE 100 MG/1
100 TABLET, FILM COATED ORAL 2 TIMES DAILY
Status: DISCONTINUED | OUTPATIENT
Start: 2022-12-23 | End: 2022-12-23

## 2022-12-23 RX ORDER — SODIUM CHLORIDE 0.9 % (FLUSH) 0.9 %
5-40 SYRINGE (ML) INJECTION EVERY 12 HOURS SCHEDULED
Status: DISCONTINUED | OUTPATIENT
Start: 2022-12-23 | End: 2022-12-26 | Stop reason: HOSPADM

## 2022-12-23 RX ORDER — DIVALPROEX SODIUM 500 MG/1
500 TABLET, DELAYED RELEASE ORAL 2 TIMES DAILY
COMMUNITY

## 2022-12-23 RX ORDER — QUETIAPINE FUMARATE 100 MG/1
200 TABLET, FILM COATED ORAL 2 TIMES DAILY
Status: DISCONTINUED | OUTPATIENT
Start: 2022-12-23 | End: 2022-12-26 | Stop reason: HOSPADM

## 2022-12-23 RX ORDER — SODIUM CHLORIDE 0.9 % (FLUSH) 0.9 %
5-40 SYRINGE (ML) INJECTION PRN
Status: DISCONTINUED | OUTPATIENT
Start: 2022-12-23 | End: 2022-12-26 | Stop reason: HOSPADM

## 2022-12-23 RX ORDER — OXYCODONE HYDROCHLORIDE 5 MG/1
5 TABLET ORAL EVERY 4 HOURS PRN
Status: DISCONTINUED | OUTPATIENT
Start: 2022-12-23 | End: 2022-12-23

## 2022-12-23 RX ORDER — SODIUM CHLORIDE 9 MG/ML
INJECTION, SOLUTION INTRAVENOUS PRN
Status: DISCONTINUED | OUTPATIENT
Start: 2022-12-23 | End: 2022-12-26 | Stop reason: HOSPADM

## 2022-12-23 RX ORDER — PRAZOSIN HYDROCHLORIDE 2 MG/1
2 CAPSULE ORAL NIGHTLY
COMMUNITY

## 2022-12-23 RX ORDER — BENZTROPINE MESYLATE 0.5 MG/1
0.5 TABLET ORAL 2 TIMES DAILY
COMMUNITY

## 2022-12-23 RX ORDER — ONDANSETRON 2 MG/ML
4 INJECTION INTRAMUSCULAR; INTRAVENOUS EVERY 6 HOURS PRN
Status: DISCONTINUED | OUTPATIENT
Start: 2022-12-23 | End: 2022-12-26 | Stop reason: HOSPADM

## 2022-12-23 RX ORDER — BUSPIRONE HYDROCHLORIDE 15 MG/1
15 TABLET ORAL DAILY
COMMUNITY

## 2022-12-23 RX ORDER — BENZTROPINE MESYLATE 1 MG/1
1 TABLET ORAL 2 TIMES DAILY
Status: DISCONTINUED | OUTPATIENT
Start: 2022-12-23 | End: 2022-12-23

## 2022-12-23 RX ORDER — ONDANSETRON 4 MG/1
4 TABLET, ORALLY DISINTEGRATING ORAL EVERY 8 HOURS PRN
Status: DISCONTINUED | OUTPATIENT
Start: 2022-12-23 | End: 2022-12-26 | Stop reason: HOSPADM

## 2022-12-23 RX ADMIN — MORPHINE SULFATE 1 MG: 2 INJECTION, SOLUTION INTRAMUSCULAR; INTRAVENOUS at 22:08

## 2022-12-23 RX ADMIN — ONDANSETRON 4 MG: 2 INJECTION INTRAMUSCULAR; INTRAVENOUS at 08:29

## 2022-12-23 RX ADMIN — TRAMADOL HYDROCHLORIDE 50 MG: 50 TABLET ORAL at 08:18

## 2022-12-23 RX ADMIN — SENNOSIDES AND DOCUSATE SODIUM 2 TABLET: 8.6; 5 TABLET ORAL at 09:51

## 2022-12-23 RX ADMIN — MORPHINE SULFATE 1 MG: 2 INJECTION, SOLUTION INTRAMUSCULAR; INTRAVENOUS at 07:48

## 2022-12-23 RX ADMIN — BENZTROPINE MESYLATE 0.5 MG: 1 TABLET ORAL at 22:00

## 2022-12-23 RX ADMIN — OXYCODONE 5 MG: 5 TABLET ORAL at 09:56

## 2022-12-23 RX ADMIN — POTASSIUM CHLORIDE 40 MEQ: 1500 TABLET, EXTENDED RELEASE ORAL at 09:51

## 2022-12-23 RX ADMIN — SODIUM CHLORIDE: 9 INJECTION, SOLUTION INTRAVENOUS at 02:55

## 2022-12-23 RX ADMIN — BENZTROPINE MESYLATE 0.5 MG: 1 TABLET ORAL at 09:51

## 2022-12-23 RX ADMIN — VANCOMYCIN HYDROCHLORIDE 750 MG: 750 INJECTION, POWDER, LYOPHILIZED, FOR SOLUTION INTRAVENOUS at 13:02

## 2022-12-23 RX ADMIN — SODIUM CHLORIDE: 9 INJECTION, SOLUTION INTRAVENOUS at 18:24

## 2022-12-23 RX ADMIN — TRAMADOL HYDROCHLORIDE 50 MG: 50 TABLET ORAL at 19:09

## 2022-12-23 RX ADMIN — MORPHINE SULFATE 1 MG: 2 INJECTION, SOLUTION INTRAMUSCULAR; INTRAVENOUS at 02:59

## 2022-12-23 RX ADMIN — VANCOMYCIN HYDROCHLORIDE 750 MG: 750 INJECTION, POWDER, LYOPHILIZED, FOR SOLUTION INTRAVENOUS at 02:54

## 2022-12-23 RX ADMIN — SODIUM CHLORIDE, PRESERVATIVE FREE 10 ML: 5 INJECTION INTRAVENOUS at 08:13

## 2022-12-23 RX ADMIN — PRAZOSIN HYDROCHLORIDE 2 MG: 1 CAPSULE ORAL at 22:01

## 2022-12-23 RX ADMIN — BUSPIRONE HYDROCHLORIDE 15 MG: 5 TABLET ORAL at 08:12

## 2022-12-23 RX ADMIN — QUETIAPINE FUMARATE 200 MG: 100 TABLET ORAL at 22:00

## 2022-12-23 ASSESSMENT — PAIN DESCRIPTION - FREQUENCY
FREQUENCY: CONTINUOUS

## 2022-12-23 ASSESSMENT — PAIN SCALES - GENERAL
PAINLEVEL_OUTOF10: 0
PAINLEVEL_OUTOF10: 10
PAINLEVEL_OUTOF10: 7
PAINLEVEL_OUTOF10: 10
PAINLEVEL_OUTOF10: 9
PAINLEVEL_OUTOF10: 10
PAINLEVEL_OUTOF10: 8
PAINLEVEL_OUTOF10: 7
PAINLEVEL_OUTOF10: 10
PAINLEVEL_OUTOF10: 10
PAINLEVEL_OUTOF10: 6

## 2022-12-23 ASSESSMENT — PAIN SCALES - WONG BAKER
WONGBAKER_NUMERICALRESPONSE: 0
WONGBAKER_NUMERICALRESPONSE: 0

## 2022-12-23 ASSESSMENT — PAIN DESCRIPTION - ORIENTATION
ORIENTATION: RIGHT

## 2022-12-23 ASSESSMENT — PAIN DESCRIPTION - PAIN TYPE: TYPE: ACUTE PAIN

## 2022-12-23 ASSESSMENT — PAIN DESCRIPTION - DESCRIPTORS
DESCRIPTORS: ACHING
DESCRIPTORS: CRUSHING;SORE
DESCRIPTORS: ACHING;SHARP

## 2022-12-23 ASSESSMENT — PAIN DESCRIPTION - LOCATION
LOCATION: HAND
LOCATION: FINGER (COMMENT WHICH ONE)
LOCATION: HAND
LOCATION: HAND

## 2022-12-23 ASSESSMENT — PAIN - FUNCTIONAL ASSESSMENT
PAIN_FUNCTIONAL_ASSESSMENT: ACTIVITIES ARE NOT PREVENTED
PAIN_FUNCTIONAL_ASSESSMENT: PREVENTS OR INTERFERES SOME ACTIVE ACTIVITIES AND ADLS

## 2022-12-23 ASSESSMENT — PAIN DESCRIPTION - ONSET: ONSET: ON-GOING

## 2022-12-23 NOTE — PROGRESS NOTES
ACUTE OCCUPATIONAL THERAPY GOALS:   (Developed with and agreed upon by patient and/or caregiver.)  1. Patient will complete lower body bathing and dressing with INDEPENDENCE and adaptive equipment as needed. 2. Patient will complete toileting with INDEPENDENCE. 3. Patient will complete grooming ADL standing at sink with INDEPENDENCE.  4. Patient will tolerate 25 minutes of OT treatment with 1-2 rest breaks to increase activity tolerance for ADLs. 5. Patient will complete functional transfers with INDEPENDENCE. 6. Patient will tolerate 10 minutes BUE exercises to increase strength for safe, functional transfers. Timeframe: 7 visits     OCCUPATIONAL THERAPY Initial Assessment and Daily Note       OT Visit Days: 1  Acknowledge Orders  Time  OT Charge Capture  Rehab Caseload Tracker      Janie Ellis is a 38 y.o. female   PRIMARY DIAGNOSIS: Wound infection  Wound infection [T14. 8XXA, L08.9]  Finger infection [L08.9]       Reason for Referral: Pain in Right Wrist (M25.531)  Inpatient: Payor: MEDICARE / Plan: MEDICARE PART A AND B / Product Type: *No Product type* /     ASSESSMENT:     REHAB RECOMMENDATIONS:   Recommendation to date pending progress:  Setting:  Outpatient Therapy pending compliance and social situation    Equipment:    To Be Determined     ASSESSMENT:  Ms. Madan Dave is a 39 y/o female presents with R index finger wound infection. Pt seen at request of Dr. Ilene John. Pt has hx of polysubstance abuse and PTSD. Pt baseline is unknown at this time as pt was not able to speak in coherent sentences--all speech garbled. Pt with difficulty keeping her eyes open, extremely restless in bed and thrashing her extremities. When therapist was assessing pt R hand/finger, pt with difficulty following commands to test ROM and strength--pt would scream out nonsensical words and was unable to tell me if she was in pain.  Pt was able to come to sit on EOB with SBA and don socks with BUE and fair dynamic sitting balance. Pt then stood a few times with SBA, but was ultimately unsafe to walk to bathroom/sink for further assessment of fine motor skilled due to inability to control her body or keep her eyes open. Pt was returned to supine and RN and MD present to assess. Pt would greatly benefit from OP OT post acute care stay to increase ROM, strength and fine motor skills in RUE. However, unsure pt baseline and ability to make it to appointments. Will continue to assess as mentation improves.       [address] [facility] [address] [address] AM-PAC 6 Clicks Daily Activity Inpatient Short Form:    AM-PAC Daily Activity Inpatient   How much help for putting on and taking off regular lower body clothing?: A Little  How much help for Bathing?: A Little  How much help for Toileting?: A Little  How much help for putting on and taking off regular upper body clothing?: None  How much help for taking care of personal grooming?: A Little  How much help for eating meals?: A Little  AM-[facility] Inpatient Daily Activity Raw Score: 19  AM-PAC Inpatient ADL T-Scale Score : 40.22  ADL Inpatient CMS 0-100% Score: 42.8  ADL Inpatient CMS G-Code Modifier : CK           SUBJECTIVE:     Ms. Madan Dave states, screamed, speaking nonsensical words     Social/Functional  unknown as patient was not oriented today, although pt nurse tells me pt was speaking coherently earlier today    OBJECTIVE:     Estee Rgling / Brittany Araujo / Yo Yee: IV    RESTRICTIONS/PRECAUTIONS:  Restrictions/Precautions: Fall Risk    PAIN: VITALS / O2:   Pre Treatment:   Pain Assessment: Face, Legs, Activity, Cry, and Consolability (FLACC)  Pain Level: 8  Pain Location: Hand  Pain Orientation: Right  Non-Pharmaceutical Pain Intervention(s): Nurse notified (comment)      Post Treatment: did not appear in pain, still restless in bed with MD and RN present        Vitals          Oxygen            GROSS EVALUATION: INTACT IMPAIRED   (See Comments)   UE AROM [x] []   UE PROM [x] []   Strength []  Difficult to assess due to thrashing extremities   Posture / Balance []  Fair sitting balance, poor standing    Sensation []  NT   Coordination []  Difficulty controlling extremities     Tone [x]       Edema [x]    Activity Tolerance []  Fatigued also restless in bed     Hand Dominance R [] L []      COGNITION/  PERCEPTION: INTACT IMPAIRED   (See Comments)   Orientation []  Unable to tell me any orienting marks   Vision []  Difficulty keeping eyes open throughout   Hearing [x]     Cognition  []  Decreased safety awareness, insight into deficits   Perception []  Cues for initiation, sequencing, motor planning     MOBILITY: I Mod I S SBA CGA Min Mod Max Total  NT x2 Comments:   Bed Mobility    Rolling [] [] [] [] [] [] [] [] [] [x] []    Supine to Sit [] [] [] [x] [] [] [] [] [] [] []    Scooting [] [] [] [x] [] [] [] [] [] [] []    Sit to Supine [] [] [] [x] [] [] [] [] [] [] []    Transfers    Sit to Stand [] [] [] [x] [] [] [] [] [] [] []    Bed to Chair [] [] [] [] [] [] [] [] [] [x] []    Stand to Sit [] [] [] [x] [] [] [] [] [] [] []    Tub/Shower [] [] [] [] [] [] [] [] [] [x] []     Toilet [] [] [] [] [] [] [] [] [] [x] []      [] [] [] [] [] [] [] [] [] [x] []    I=Independent, Mod I=Modified Independent, S=Supervision/Setup, SBA=Standby Assistance, CGA=Contact Guard Assistance, Min=Minimal Assistance, Mod=Moderate Assistance, Max=Maximal Assistance, Total=Total Assistance, NT=Not Tested    ACTIVITIES OF DAILY LIVING: I Mod I S SBA CGA Min Mod Max Total NT Comments: unsafe to walk to bathroom due to current state   BASIC ADLs:              Upper Body Bathing  [] [] [] [] [] [] [] [] [] [x]    Lower Body Bathing [] [] [] [] [] [] [] [] [] [x]    Toileting [] [] [] [] [] [] [] [] [] [x]    Upper Body Dressing [] [] [] [] [] [] [] [] [] [x]    Lower Body Dressing [] [] [] [x] [] [] [] [] [] [] Donning socks EOB   Feeding [] [] [] [] [] [] [] [] [] [x]    Grooming [] [] [] [] [] [] [] [] [] [x]    Personal Device Care [] [] [] [] [] [] [] [] [] [x]    Functional Mobility [] [] [] [x] [] [] [] [] [] []    I=Independent, Mod I=Modified Independent, S=Supervision/Setup, SBA=Standby Assistance, CGA=Contact Guard Assistance, Min=Minimal Assistance, Mod=Moderate Assistance, Max=Maximal Assistance, Total=Total Assistance, NT=Not Tested    PLAN:   [address] [place] [address] of Care: 3 times/week for duration of hospital stay or until stated goals are met, whichever comes first.    PROBLEM LIST:   (Skilled intervention is medically necessary to address:)  Decreased ADL/Functional Activities  Decreased Activity Tolerance  Decreased AROM/PROM  Decreased Balance  Decreased Cognition  Decreased Coordination  Decreased Gait Ability  Decreased Safety Awareness  Decreased Strength  Decreased Transfer Abilities   INTERVENTIONS PLANNED:  (Benefits and precautions of occupational therapy have been discussed with the patient.)  Self Care Training  Therapeutic Activity  Therapeutic Exercise/HEP  Neuromuscular Re-education  Manual Therapy  Education         TREATMENT:     EVALUATION: MODERATE COMPLEXITY: (Untimed Charge)    TREATMENT:   Therapeutic Activity (12 Minutes): Patient participated in therapeutic activities including bed mobility training, functional transfer training, sitting tolerance activity, standing tolerance activity, and fine motor skill activity with moderate assistance, verbal cues, tactile cues, and visual cues in order to increase independence, increase safety awareness, increase activity tolerance, increase coordination, decrease assistance required, prepare for functional activity, and prepare for functional transfers.      TREATMENT GRID:  N/A    AFTER TREATMENT PRECAUTIONS: Alarm Activated, Bed, Call light within reach, Needs within reach, RN notified, and MD at bedside    INTERDISCIPLINARY COLLABORATION:  RN/ PCT and OT/ SEGURA    EDUCATION:  Education Given To: Patient  Education Provided: Role of Therapy  Education Method: Verbal  Barriers to Learning: None;Cognition  Education Outcome: Verbalized understanding;Continued education needed    TOTAL TREATMENT DURATION AND TIME:  Time In: 1405  Time Out: [address]  Minutes: [facility],Sixth Floor, OT

## 2022-12-23 NOTE — ED NOTES
TRANSFER - OUT REPORT:    Verbal report given to [facility] [place] RN on [employer]  being transferred to [facility] for routine progression of patient care       Report consisted of patient's Situation, Background, Assessment and   Recommendations(SBAR). Information from the following report(s) ED Encounter Summary, ED SBAR and [facility] was reviewed with the receiving nurse. [place] Assessment: No data recorded  Lines:   Peripheral IV 12/22/22 Left Forearm (Active)   Site Assessment Clean, dry & intact 12/22/22 1810   Line Status Blood return noted 12/22/22 1810   Phlebitis Assessment No symptoms 12/22/22 1810   Infiltration Assessment 0 12/22/22 1810   Alcohol Cap Used No 12/22/22 1810   Dressing Status Clean, dry & intact 12/22/22 1810   Dressing Type Transparent 12/22/22 1810   Dressing Intervention New 12/22/22 1810        Opportunity for questions and clarification was provided.       Patient transported with:  Virginia Cheung RN  12/23/22 4969

## 2022-12-23 NOTE — PROGRESS NOTES
[facility] Pharmacokinetic Monitoring Service - Vancomycin     Manoj Ambriz is a 38 y.o. female starting on vancomycin therapy for SSTI. Pharmacy consulted by Dr. Joanna Sood for monitoring and adjustment. Target Concentration: Goal trough of 10-15 mg/L and AUC/ANDREA <500 mg*hr/L    Additional Antimicrobials: none    Patient eligible for piperacillin-tazobactam to cefepime auto-substitution per P&T approved protocol? N/A    Pertinent Laboratory Values: Wt Readings from Last 1 Encounters:   12/22/22 110 lb (49.9 kg)     Temp Readings from Last 1 Encounters:   12/22/22 97.7 °F (36.5 °C) (Oral)     Recent Labs     12/22/22  1810   BUN 12   CREATININE 1.00   WBC 5.9     Estimated Creatinine Clearance: 59 mL/min (based on SCr of 1 mg/dL). No results found for: Ketty Garcia    MRSA Nasal Swab: N/A. Non-respiratory infection. Plan:  Dosing recommendations based on Bayesian software  Start vancomycin 750 mg IV q12h  Anticipated AUC of 559 and trough concentration of 17.9 at steady state  Pt was subtherapeutic of Vanc 750 mg IV q12h last admission (11/19/22-11/23/22) and was increased to Vanc 1 g IV q12h. However current admission shows slight increase in Scr.   Will start on Vanc 750 mg IV q12h and adjust as necessary  Renal labs as indicated   Vancomycin concentration ordered for 12/24 @ 0600    Pharmacy will continue to monitor patient and adjust therapy as indicated    Thank you for the consult,  [address] [place] [address]

## 2022-12-23 NOTE — PROGRESS NOTES
END OF SHIFT NOTE:    INTAKE/OUTPUT  12/22 0701 - 12/23 0700  In: 305.2 [I.V.:305.2]  Out: -   Voiding: Yes  Catheter: No  Drain:        Flatus: Patient does have flatus present. Stool: 0 occurrences. Characteristics:  Stool Appearance: Unable to assess  Stool Color: Unable to assess  Stool Amount: Unable to assess  Stool Assessment  Stool Appearance: Unable to assess  Stool Color: Unable to assess  Stool Amount: Unable to assess  Last BM (including prior to admit): 12/22/22 (per patient)    Emesis: 0 occurrences. Characteristics:        VITAL SIGNS  Patient Vitals for the past 12 hrs:   Temp Pulse Resp BP SpO2   12/23/22 1436 -- 87 20 101/70 98 %   12/23/22 1300 97.6 °F (36.4 °C) -- -- -- --   12/23/22 1141 -- 81 18 115/69 98 %   12/23/22 0725 98.4 °F (36.9 °C) 86 18 122/79 97 %       Pain Assessment  Pain Level: 8 (12/23/22 1452)  Pain Location: Hand  Patient's Stated Pain Goal: 3    Ambulating  Yes    Shift report given to oncoming nurse at the bedside.     Eloy Izquierdo RN

## 2022-12-23 NOTE — PROGRESS NOTES
Removed sutures to palm of right hand. Cleansed right index finger wound with wound cleanser. Applied Xeroform gauze and dry guaze. Secured with tape. Per Ortho order.

## 2022-12-23 NOTE — PROGRESS NOTES
Hospitalist Progress Note   Admit Date:  2022  5:30 PM   Name:  Linda Catalan   Age:  38 y.o. Sex:  female  :  1980   MRN:  [id number]   Room:  /    Presenting Complaint: Post-op Problem     Reason(s) for Admission: Wound infection [T14. 8XXA, L08.9]  Finger infection [L08.9]     Hospital Course: This is a 29-year-old female with past medical history significant for right index finger infection status post I&D in . [facility] She has wound cultures positive for MRSA. She was treated with IV vancomycin for 5 days and then was given prescription for doxycycline. She was lost to follow-up. She also has not filled her antibiotics. She has been having worsening pain in her right index finger and therefore presented to the ER. Patient was admitted to hospitalist service and orthopedics consulted. Subjective & 24hr Events (22): Patient complains of pain in her right index finger. No fever no chills. No chest pain or shortness of breath. No nausea no vomiting. Assessment & Plan: This is a 29-year-old female with    Right index finger infection/MRSA  Orthopedics consulted. Does not recommend another surgical debridement. Continue IV antibiotics vancomycin for 48 hours. Continue hydration with IV fluids. Posttraumatic stress disorder/polysubstance use  Resume home medications. Patient does not want to take her Depakote. Continue Seroquel, Cogentin, trazodone as needed      Anticipated discharge needs:    Social issues. Case management consulted. Diet:  ADULT DIET; Regular  DVT PPx: SCDs  Code status: Full Code    Hospital Problems:  Principal Problem:    Wound infection  Resolved Problems:    * No resolved hospital problems.  *      Objective:   Patient Vitals for the past 24 hrs:   Temp Pulse Resp BP SpO2   22 0725 98.4 °F (36.9 °C) 86 18 122/79 97 %   22 0329 -- -- 16 -- --   22 0259 -- -- 16 -- --   22 0230 -- 95 -- -- --   22 2301 -- 95 -- 108/70 --   12/23/22 0216 98.5 °F (36.9 °C) 95 18 108/70 --   12/23/22 0123 -- 95 16 (!) 137/97 97 %   12/22/22 1715 97.7 °F (36.5 °C) 92 17 129/80 100 %       Oxygen Therapy  SpO2: 97 %  O2 Device: None (Room air)    Estimated body mass index is 19.49 kg/m² as calculated from the following:    Height as of this encounter: 5' 3\" (1.6 m). Weight as of this encounter: 110 lb (49.9 kg). Intake/Output Summary (Last 24 hours) at 12/23/2022 1100  Last data filed at 12/23/2022 0947  Gross per 24 hour   Intake 527.16 ml   Output --   Net 527.16 ml         Physical Exam:     Blood pressure 122/79, pulse 86, temperature 98.4 °F (36.9 °C), temperature source Oral, resp. rate 18, height 5' 3\" (1.6 m), weight 110 lb (49.9 kg), SpO2 97 %. General:    Well nourished. Alert awake female, appears older than stated age, chronically ill-appearing,  Head:  Normocephalic, atraumatic  Eyes:  Sclerae appear normal.  Pupils equally round. ENT:  Nares appear normal, no drainage. Moist oral mucosa  Neck:  No restricted ROM. Trachea midline   CV:   RRR. No m/r/g. No jugular venous distension. Lungs:   CTAB. No wheezing, rhonchi, or rales. Symmetric expansion. Abdomen: Bowel sounds present. Soft, nontender, nondistended. Extremities: No cyanosis or clubbing. Erythematous, edematous right index finger with purulent drainage, with decreased range of motion. Skin:     No rashes and normal coloration. Warm and dry. Neuro:  CN II-XII grossly intact. Sensation intact. A&Ox3  Psych:  Normal mood and affect.       I have personally reviewed labs and tests showing:  Recent Labs:  Recent Results (from the past 48 hour(s))   POCT Urinalysis no Micro    Collection Time: 12/22/22  5:50 PM   Result Value Ref Range    Specific Gravity, Urine, POC >1.030 (H) 1.001 - 1.023    pH, Urine, POC 6.0 5.0 - 9.0      Protein, Urine, POC 30 (A) NEG mg/dL    Glucose, UA POC Negative NEG mg/dL    Ketones, Urine, POC TRACE (A) NEG mg/dL    Bilirubin, Urine, POC Negative NEG      Blood, UA POC LARGE (A) NEG      URINE UROBILINOGEN POC 0.2 0.2 - 1.0 EU/dL    Nitrate, Urine, POC Negative NEG      Leukocyte Est, UA POC Negative NEG      Performed by: Carley Parker    CMP    Collection Time: 12/22/22  6:10 PM   Result Value Ref Range    Sodium 138 133 - 143 mmol/L    Potassium 3.1 (L) 3.5 - 5.1 mmol/L    Chloride 106 101 - 110 mmol/L    CO2 26 21 - 32 mmol/L    Anion Gap 6 2 - 11 mmol/L    Glucose 126 (H) 65 - 100 mg/dL    BUN 12 6 - 23 MG/DL    Creatinine 1.00 0.6 - 1.0 MG/DL    Est, Glom Filt Rate >60 >60 ml/min/1.73m2    Calcium 9.0 8.3 - 10.4 MG/DL    Total Bilirubin 0.5 0.2 - 1.1 MG/DL    ALT 73 (H) 12 - 65 U/L    AST 37 15 - 37 U/L    Alk Phosphatase 163 (H) 50 - 136 U/L    Total Protein 7.3 6.3 - 8.2 g/dL    Albumin 3.7 3.5 - 5.0 g/dL    Globulin 3.6 2.8 - 4.5 g/dL    Albumin/Globulin Ratio 1.0 0.4 - 1.6     CBC with Auto Differential    Collection Time: 12/22/22  6:10 PM   Result Value Ref Range    WBC 5.9 4.3 - 11.1 K/uL    RBC 4.32 4.05 - 5.2 M/uL    Hemoglobin 12.4 11.7 - 15.4 g/dL    Hematocrit 38.4 35.8 - 46.3 %    MCV 88.9 82 - 102 FL    MCH 28.7 26.1 - 32.9 PG    MCHC 32.3 31.4 - 35.0 g/dL    RDW 14.5 11.9 - 14.6 %    Platelets 850 074 - 665 K/uL    MPV 9.6 9.4 - 12.3 FL    nRBC 0.00 0.0 - 0.2 K/uL    Differential Type AUTOMATED      Seg Neutrophils 69 43 - 78 %    Lymphocytes 21 13 - 44 %    Monocytes 9 4.0 - 12.0 %    Eosinophils % 1 0.5 - 7.8 %    Basophils 0 0.0 - 2.0 %    Immature Granulocytes 0 0.0 - 5.0 %    Segs Absolute 4.1 1.7 - 8.2 K/UL    Absolute Lymph # 1.2 0.5 - 4.6 K/UL    Absolute Mono # 0.5 0.1 - 1.3 K/UL    Absolute Eos # 0.0 0.0 - 0.8 K/UL    Basophils Absolute 0.0 0.0 - 0.2 K/UL    Absolute Immature Granulocyte 0.0 0.0 - 0.5 K/UL   POCT Urinalysis no Micro    Collection Time: 12/22/22  6:57 PM   Result Value Ref Range    Specific Gravity, Urine, POC >1.030 (H) 1.001 - 1.023    pH, Urine, POC 6.0 5.0 - 9.0 Protein, Urine, POC 30 (A) NEG mg/dL    Glucose, UA POC Negative NEG mg/dL    Ketones, Urine, POC Negative NEG mg/dL    Bilirubin, Urine, POC Negative NEG      Blood, UA POC LARGE (A) NEG      URINE UROBILINOGEN POC 0.2 0.2 - 1.0 EU/dL    Nitrate, Urine, POC Negative NEG      Leukocyte Est, UA POC Negative NEG      Performed by: Kirill Van        I have personally reviewed imaging studies showing:   Other Studies:  No orders to display       Current Meds:  Current Facility-Administered Medications   Medication Dose Route Frequency    morphine injection 1 mg  1 mg IntraVENous Q4H PRN    0.9 % sodium chloride infusion   IntraVENous Continuous    traZODone (DESYREL) tablet 50 mg  50 mg Oral Nightly PRN    sodium chloride flush 0.9 % injection 5-40 mL  5-40 mL IntraVENous 2 times per day    sodium chloride flush 0.9 % injection 5-40 mL  5-40 mL IntraVENous PRN    0.9 % sodium chloride infusion   IntraVENous PRN    ondansetron (ZOFRAN-ODT) disintegrating tablet 4 mg  4 mg Oral Q8H PRN    Or    ondansetron (ZOFRAN) injection 4 mg  4 mg IntraVENous Q6H PRN    polyethylene glycol (GLYCOLAX) packet 17 g  17 g Oral Daily PRN    acetaminophen (TYLENOL) tablet 650 mg  650 mg Oral Q6H PRN    Or    acetaminophen (TYLENOL) suppository 650 mg  650 mg Rectal Q6H PRN    benztropine (COGENTIN) tablet 0.5 mg  0.5 mg Oral BID    busPIRone (BUSPAR) tablet 15 mg  15 mg Oral Daily    prazosin (MINIPRESS) capsule 2 mg  2 mg Oral Nightly    QUEtiapine (SEROQUEL) tablet 200 mg  200 mg Oral BID    OLANZapine (ZYPREXA) tablet 7.5 mg  7.5 mg Oral Nightly PRN    vancomycin (VANCOCIN) 750 mg in sodium chloride 0.9 % 250 mL IVPB (Glnn0Vhm)  750 mg IntraVENous Q12H    oxyCODONE (ROXICODONE) immediate release tablet 5 mg  5 mg Oral Q4H PRN    sennosides-docusate sodium (SENOKOT-S) 8.6-50 MG tablet 2 tablet  2 tablet Oral Daily    LORazepam (ATIVAN) injection 1 mg  1 mg IntraVENous Q6H PRN       Signed:  Mohan Pace MD    Part of this note may have been written by using a voice dictation software. The note has been proof read but may still contain some grammatical/other typographical errors.

## 2022-12-23 NOTE — CONSULTS
Progress Note    Patient: Vaishali Corbin MRN: [id number]  SSN: [id number]    YOB: 1980  Age: 38 y.o. Sex: female      Admit Date: 12/22/2022    LOS: 0 days     Subjective:     Patient has a history of having a pretty significant infection to her right index finger. This was treated operatively in November of this year so she is about 4 to 5 weeks out. I was actually concerned enough about her finger that we repeated her I&D. Unfortunate she has sort of a difficult social situation at does not sound like she was able to get her oral antibiotics or follow-up. She came back to the emergency room complaining of a lot of pain in her finger. Some of this is sort of clouded by the fact that she is fixated on getting more pain medications so it is very difficult to know exactly how much this has been bothering her and trying to get the details of her history from her. Objective:     Vitals:    12/23/22 0230 12/23/22 0259 12/23/22 0329 12/23/22 0725   BP:    122/79   Pulse: 95   86   Resp:  16 16 18   Temp:    98.4 °F (36.9 °C)   TempSrc:    Oral   SpO2:    97%   Weight:       Height:            Intake and Output:  Current Shift: No intake/output data recorded. Last three shifts: 12/21 1901 - 12/23 0700  In: 305.2 [I.V.:305.2]  Out: -     alert and oriented to person place time and situation  Skin -her wound actually looks much much better than it did a few weeks ago. It is granulating pretty well and it does not appear she has any obvious flexor tendon exposed. So I am very pleased with the way this look. Her surgical incision in the palm of her hand also has healed and it needs her sutures removed. Motor and sensory function intact in RIGHT UPPER extremity is very difficult for me to get a detailed exam trying to make sure that her digital nerves are intact on both the radial and ulnar sides of her index finger.   I think when she is in less discomfort and I can do a true moving two-point I can better assess her for this she is just too uncomfortable and too fixated on getting pain medication right now for me to really confidently say that she has no sensory deficit in her index finger  Pulses palpable in RIGHT UPPER extremity       Lab/Data Review:  Recent Labs     12/22/22  1810   HGB 12.4   HCT 38.4          Assessment:   POD approximately 5 weeks or so out from I&D of right index finger with no follow-up    Plan:     I do not think she needs any further surgical intervention. I think this is actually healing pretty well. She has a lot of stiffness in her finger. I think it would be reasonable to try to give her some IV antibiotics and perhaps get occupational therapy to see her and get her moving. She also needs her sutures removed from the volar aspect of her right hand. I think she can eat today. I would recommend just some IV antibiotics for the next 48 to 72 hours perhaps and then she can likely transition to something oral.  Perhaps we can try to make sure that she gets her oral antibiotics filled and that she comes back to see me in the office for follow-up.     Signed By: Cata Nolasco MD     December 23, 2022

## 2022-12-23 NOTE — PROGRESS NOTES
TRANSFER - IN REPORT:    Verbal report received from RADHAαλκοκονδύλdevendra Sarmiento RN on [facility]  being received from ED for routine progression of patient care      Report consisted of patient's Situation, Background, Assessment and   Recommendations(SBAR). Information from the following report(s) Adult Overview, Intake/Output, MAR, and Recent Results was reviewed with the receiving nurse. Opportunity for questions and clarification was provided. Assessment to be completed upon patient's arrival to unit and care assumed.

## 2022-12-23 NOTE — PROGRESS NOTES
END OF SHIFT NOTE:    INTAKE/OUTPUT  12/22 0701 - 12/23 0700  In: 305.2 [I.V.:305.2]  Out: -   Voiding: Yes  Catheter: No          Flatus: Patient does have flatus present. Stool:  0 occurrences. Last BM (including prior to admit): 12/22/2022       Emesis: 0 occurrences. VITAL SIGNS  Patient Vitals for the past 12 hrs:   Temp Pulse Resp BP SpO2   12/23/22 0329 -- -- 16 -- --   12/23/22 0259 -- -- 16 -- --   12/23/22 0230 -- 95 -- -- --   12/23/22 0223 -- 95 -- 108/70 --   12/23/22 0216 98.5 °F (36.9 °C) 95 18 108/70 --   12/23/22 0123 -- 95 16 (!) 137/97 97 %     Ambulating  Yes    Shift report given to oncoming nurse at the bedside.     Abiola Maynard RN
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
## 2022-12-23 NOTE — PROGRESS NOTES
Patient noted to be drowsy but awakes into an agitated state and thrashing in bed. A&O to name and place. Request Dr. Luz Elena Phan to come assess patient. MD stated would adjust some of her prn medications.

## 2022-12-24 LAB
ALBUMIN SERPL-MCNC: 2.7 G/DL (ref 3.5–5)
ALBUMIN/GLOB SERPL: 1.1 (ref 0.4–1.6)
ALP SERPL-CCNC: 114 U/L (ref 50–136)
ALT SERPL-CCNC: 38 U/L (ref 12–65)
ANION GAP SERPL CALC-SCNC: 3 MMOL/L (ref 2–11)
AST SERPL-CCNC: 17 U/L (ref 15–37)
BASOPHILS # BLD: 0 K/UL (ref 0–0.2)
BASOPHILS NFR BLD: 1 % (ref 0–2)
BILIRUB SERPL-MCNC: 0.3 MG/DL (ref 0.2–1.1)
BUN SERPL-MCNC: 14 MG/DL (ref 6–23)
CALCIUM SERPL-MCNC: 8.3 MG/DL (ref 8.3–10.4)
CHLORIDE SERPL-SCNC: 111 MMOL/L (ref 101–110)
CO2 SERPL-SCNC: 30 MMOL/L (ref 21–32)
CREAT SERPL-MCNC: 0.7 MG/DL (ref 0.6–1)
DIFFERENTIAL METHOD BLD: ABNORMAL
EOSINOPHIL # BLD: 0.1 K/UL (ref 0–0.8)
EOSINOPHIL NFR BLD: 2 % (ref 0.5–7.8)
ERYTHROCYTE [DISTWIDTH] IN BLOOD BY AUTOMATED COUNT: 14.6 % (ref 11.9–14.6)
GLOBULIN SER CALC-MCNC: 2.5 G/DL (ref 2.8–4.5)
GLUCOSE SERPL-MCNC: 90 MG/DL (ref 65–100)
HCT VFR BLD AUTO: 33.6 % (ref 35.8–46.3)
HGB BLD-MCNC: 10.5 G/DL (ref 11.7–15.4)
IMM GRANULOCYTES # BLD AUTO: 0 K/UL (ref 0–0.5)
IMM GRANULOCYTES NFR BLD AUTO: 0 % (ref 0–5)
LYMPHOCYTES # BLD: 1.7 K/UL (ref 0.5–4.6)
LYMPHOCYTES NFR BLD: 44 % (ref 13–44)
MCH RBC QN AUTO: 28.6 PG (ref 26.1–32.9)
MCHC RBC AUTO-ENTMCNC: 31.3 G/DL (ref 31.4–35)
MCV RBC AUTO: 91.6 FL (ref 82–102)
MONOCYTES # BLD: 0.4 K/UL (ref 0.1–1.3)
MONOCYTES NFR BLD: 11 % (ref 4–12)
NEUTS SEG # BLD: 1.6 K/UL (ref 1.7–8.2)
NEUTS SEG NFR BLD: 42 % (ref 43–78)
NRBC # BLD: 0 K/UL (ref 0–0.2)
PLATELET # BLD AUTO: 146 K/UL (ref 150–450)
PMV BLD AUTO: 9.3 FL (ref 9.4–12.3)
POTASSIUM SERPL-SCNC: 4.5 MMOL/L (ref 3.5–5.1)
PROT SERPL-MCNC: 5.2 G/DL (ref 6.3–8.2)
RBC # BLD AUTO: 3.67 M/UL (ref 4.05–5.2)
SODIUM SERPL-SCNC: 144 MMOL/L (ref 133–143)
VANCOMYCIN SERPL-MCNC: 16.6 UG/ML
WBC # BLD AUTO: 3.8 K/UL (ref 4.3–11.1)

## 2022-12-24 PROCEDURE — 2580000003 HC RX 258: Performed by: INTERNAL MEDICINE

## 2022-12-24 PROCEDURE — 6360000002 HC RX W HCPCS: Performed by: INTERNAL MEDICINE

## 2022-12-24 PROCEDURE — 6370000000 HC RX 637 (ALT 250 FOR IP): Performed by: HOSPITALIST

## 2022-12-24 PROCEDURE — 36415 COLL VENOUS BLD VENIPUNCTURE: CPT

## 2022-12-24 PROCEDURE — 6370000000 HC RX 637 (ALT 250 FOR IP): Performed by: INTERNAL MEDICINE

## 2022-12-24 PROCEDURE — 80053 COMPREHEN METABOLIC PANEL: CPT

## 2022-12-24 PROCEDURE — 85025 COMPLETE CBC W/AUTO DIFF WBC: CPT

## 2022-12-24 PROCEDURE — 1100000000 HC RM PRIVATE

## 2022-12-24 PROCEDURE — 80202 ASSAY OF VANCOMYCIN: CPT

## 2022-12-24 RX ADMIN — SODIUM CHLORIDE: 9 INJECTION, SOLUTION INTRAVENOUS at 09:11

## 2022-12-24 RX ADMIN — PRAZOSIN HYDROCHLORIDE 2 MG: 1 CAPSULE ORAL at 20:54

## 2022-12-24 RX ADMIN — TRAMADOL HYDROCHLORIDE 50 MG: 50 TABLET ORAL at 13:44

## 2022-12-24 RX ADMIN — VANCOMYCIN HYDROCHLORIDE 750 MG: 750 INJECTION, POWDER, LYOPHILIZED, FOR SOLUTION INTRAVENOUS at 22:30

## 2022-12-24 RX ADMIN — MORPHINE SULFATE 1 MG: 2 INJECTION, SOLUTION INTRAMUSCULAR; INTRAVENOUS at 16:19

## 2022-12-24 RX ADMIN — VANCOMYCIN HYDROCHLORIDE 750 MG: 750 INJECTION, POWDER, LYOPHILIZED, FOR SOLUTION INTRAVENOUS at 01:09

## 2022-12-24 RX ADMIN — ACETAMINOPHEN 650 MG: 325 TABLET ORAL at 20:32

## 2022-12-24 RX ADMIN — BUSPIRONE HYDROCHLORIDE 15 MG: 5 TABLET ORAL at 09:07

## 2022-12-24 RX ADMIN — MORPHINE SULFATE 1 MG: 2 INJECTION, SOLUTION INTRAMUSCULAR; INTRAVENOUS at 11:37

## 2022-12-24 RX ADMIN — SENNOSIDES AND DOCUSATE SODIUM 2 TABLET: 8.6; 5 TABLET ORAL at 09:07

## 2022-12-24 RX ADMIN — VANCOMYCIN HYDROCHLORIDE 750 MG: 750 INJECTION, POWDER, LYOPHILIZED, FOR SOLUTION INTRAVENOUS at 11:36

## 2022-12-24 RX ADMIN — MORPHINE SULFATE 1 MG: 2 INJECTION, SOLUTION INTRAMUSCULAR; INTRAVENOUS at 20:30

## 2022-12-24 RX ADMIN — SODIUM CHLORIDE, PRESERVATIVE FREE 10 ML: 5 INJECTION INTRAVENOUS at 09:07

## 2022-12-24 RX ADMIN — QUETIAPINE FUMARATE 200 MG: 100 TABLET ORAL at 20:31

## 2022-12-24 RX ADMIN — BENZTROPINE MESYLATE 0.5 MG: 1 TABLET ORAL at 09:08

## 2022-12-24 RX ADMIN — BENZTROPINE MESYLATE 0.5 MG: 1 TABLET ORAL at 20:54

## 2022-12-24 ASSESSMENT — PAIN SCALES - GENERAL
PAINLEVEL_OUTOF10: 8
PAINLEVEL_OUTOF10: 4
PAINLEVEL_OUTOF10: 7
PAINLEVEL_OUTOF10: 10
PAINLEVEL_OUTOF10: 4
PAINLEVEL_OUTOF10: 6

## 2022-12-24 ASSESSMENT — PAIN DESCRIPTION - ONSET
ONSET: ON-GOING

## 2022-12-24 ASSESSMENT — PAIN DESCRIPTION - DESCRIPTORS
DESCRIPTORS: THROBBING
DESCRIPTORS: THROBBING
DESCRIPTORS: ACHING;SHOOTING;THROBBING
DESCRIPTORS: THROBBING

## 2022-12-24 ASSESSMENT — PAIN DESCRIPTION - PAIN TYPE
TYPE: ACUTE PAIN

## 2022-12-24 ASSESSMENT — PAIN - FUNCTIONAL ASSESSMENT
PAIN_FUNCTIONAL_ASSESSMENT: ACTIVITIES ARE NOT PREVENTED

## 2022-12-24 ASSESSMENT — PAIN DESCRIPTION - FREQUENCY
FREQUENCY: CONTINUOUS

## 2022-12-24 ASSESSMENT — PAIN DESCRIPTION - ORIENTATION
ORIENTATION: RIGHT

## 2022-12-24 ASSESSMENT — PAIN DESCRIPTION - LOCATION
LOCATION: FINGER (COMMENT WHICH ONE)

## 2022-12-24 NOTE — PROGRESS NOTES
ORTHOPAEDIC PROGRESS NOTE    2022  Admit Date: 2022  Admit Diagnosis: Wound infection [T14. 8XXA, L08.9]  Finger infection [L08.9]      Subjective:     Radha Singh is a patient who has complaints of pain in finger. . Day #2 Vancomycin      Objective:     Vital Signs:    Blood pressure 108/72, pulse 85, temperature 98.4 °F (36.9 °C), temperature source Oral, resp. rate 18, height 5' 3\" (1.6 m), weight 110 lb (49.9 kg), SpO2 94 %. Temp (24hrs), Av °F (36.7 °C), Min:97.6 °F (36.4 °C), Max:98.4 °F (36.9 °C)       07 -  190  In: 197 [P.O.:477]  Out: -    1901 -  0700  In: 1951.2 [P.O.:582; I.V.:1369.2]  Out: 2600 [Urine:2600]    LAB:    Recent Labs     22  0602   HGB 10.5*   WBC 3.8*   *       Physical Exam    General:   Alert and oriented. No acute distress  Lungs:  Respirations unlabored. Extremities: No evidence of cyanosis. Calves soft, nontender. Moves both upper and lower extremities. Dressing:  clean, dry, and intact Finger with no erythema. Minimal swelling.  Suture removal site is clean and dry  Neuro:  no deficit      Assessment:      Patient Active Problem List   Diagnosis    Wound infection       Plan:     Continue PT/OT  Continue IV abx  - transition to oral   F/O with Dr. Irma Thompson outpatient     Signed By: Wilfrido Madera PA-C

## 2022-12-24 NOTE — PROGRESS NOTES
Hospitalist Progress Note   Admit Date:  2022  5:30 PM   Name:  Osvaldo Magallanes   Age:  38 y.o. Sex:  female  :  1980   MRN:  [id number]   Room:      Presenting Complaint: Post-op Problem     Reason(s) for Admission: Wound infection [T14. 8XXA, L08.9]  Finger infection [L08.9]     Hospital Course: This is a 31-year-old female with past medical history significant for right index finger infection status post I&D in . [facility] She has wound cultures positive for MRSA. She was treated with IV vancomycin for 5 days and then was given prescription for doxycycline. She was lost to follow-up. She also has not filled her antibiotics. She has been having worsening pain in her right index finger and therefore presented to the ER. Patient was admitted to hospitalist service and orthopedics consulted. No plans for  Or debridement per orthopedics. Recommends continuing IV antibiotics for another 24 to 48 hours. Subjective & 24hr Events (22): Patient still has pain in her right index finger. No fever no chills. No shortness of breath. No nausea no vomiting. Assessment & Plan: This is a 31-year-old female with    Right index finger infection/MRSA  Orthopedics consulted. Does not recommend another surgical debridement. Continue IV antibiotics vancomycin for another 24 hours. DC IV fluids. Posttraumatic stress disorder/polysubstance use  Resume home medications. Patient does not want to take her Depakote. Urine drug screen positive for amphetamines. Counseled to quit. Continue Seroquel, Cogentin, trazodone as needed      Anticipated discharge needs:   Anticipate discharge home in 24 to 48 hours. Social issues. Case management consulted. Diet:  ADULT DIET; Regular  DVT PPx: SCDs  Code status: Full Code    Hospital Problems:  Principal Problem:    Wound infection  Resolved Problems:    * No resolved hospital problems.  *      Objective:   Patient Vitals for the past 24 hrs:   Temp Pulse Resp BP SpO2   12/24/22 1341 -- -- -- 127/77 --   12/24/22 1207 -- -- 19 -- --   12/24/22 1103 -- 85 -- 108/72 94 %   12/24/22 1102 98.4 °F (36.9 °C) 86 18 (!) 98/58 99 %   12/24/22 0707 97.9 °F (36.6 °C) 77 18 111/80 99 %   12/24/22 0527 98 °F (36.7 °C) 72 16 120/68 --   12/24/22 0106 98.2 °F (36.8 °C) 76 16 118/66 --   12/23/22 2208 -- -- 20 -- --   12/23/22 2201 -- -- -- 120/70 --   12/23/22 1940 97.9 °F (36.6 °C) 91 18 104/63 99 %   12/23/22 1436 -- 87 20 101/70 98 %       Oxygen Therapy  SpO2: 94 %  O2 Device: None (Room air)    Estimated body mass index is 19.49 kg/m² as calculated from the following:    Height as of this encounter: 5' 3\" (1.6 m). Weight as of this encounter: 110 lb (49.9 kg). Intake/Output Summary (Last 24 hours) at 12/24/2022 1405  Last data filed at 12/24/2022 1355  Gross per 24 hour   Intake 2243 ml   Output 1700 ml   Net 543 ml         Physical Exam:     Blood pressure 127/77, pulse 85, temperature 98.4 °F (36.9 °C), temperature source Oral, resp. rate 19, height 5' 3\" (1.6 m), weight 110 lb (49.9 kg), SpO2 94 %. General:    Well nourished. Alert awake female, appears older than stated age, chronically ill-appearing,  Head:  Normocephalic, atraumatic  Eyes:  Sclerae appear normal.  Pupils equally round. ENT:  Nares appear normal, no drainage. Moist oral mucosa  Neck:  No restricted ROM. Trachea midline   CV:   RRR. No m/r/g. No jugular venous distension. Lungs:   CTAB. No wheezing, rhonchi, or rales. Symmetric expansion. Abdomen: Bowel sounds present. Soft, nontender, nondistended. Extremities: No cyanosis or clubbing. Improved Erythema, edema right index finger with no drainage, with decreased range of motion. Skin:     No rashes and normal coloration. Warm and dry. Neuro:  CN II-XII grossly intact. Sensation intact. A&Ox3  Psych:  Normal mood and affect.       I have personally reviewed labs and tests showing:  Recent Labs:  Recent Results (from the past 48 hour(s))   POCT Urinalysis no Micro    Collection Time: 12/22/22  5:50 PM   Result Value Ref Range    Specific Gravity, Urine, POC >1.030 (H) 1.001 - 1.023    pH, Urine, POC 6.0 5.0 - 9.0      Protein, Urine, POC 30 (A) NEG mg/dL    Glucose, UA POC Negative NEG mg/dL    Ketones, Urine, POC TRACE (A) NEG mg/dL    Bilirubin, Urine, POC Negative NEG      Blood, UA POC LARGE (A) NEG      URINE UROBILINOGEN POC 0.2 0.2 - 1.0 EU/dL    Nitrate, Urine, POC Negative NEG      Leukocyte Est, UA POC Negative NEG      Performed by: Carmen Valladares    [facility]    Collection Time: 12/22/22  6:10 PM   Result Value Ref Range    Sodium 138 133 - 143 mmol/L    Potassium 3.1 (L) 3.5 - 5.1 mmol/L    Chloride 106 101 - 110 mmol/L    CO2 26 21 - 32 mmol/L    Anion Gap 6 2 - 11 mmol/L    Glucose 126 (H) 65 - 100 mg/dL    BUN 12 6 - 23 MG/DL    Creatinine 1.00 0.6 - 1.0 MG/DL    Est, Glom Filt Rate >60 >60 ml/min/1.73m2    Calcium 9.0 8.3 - 10.4 MG/DL    Total Bilirubin 0.5 0.2 - 1.1 MG/DL    ALT 73 (H) 12 - 65 U/L    AST 37 15 - 37 U/L    Alk Phosphatase 163 (H) 50 - 136 U/L    Total Protein 7.3 6.3 - 8.2 g/dL    Albumin 3.7 3.5 - 5.0 g/dL    Globulin 3.6 2.8 - 4.5 g/dL    Albumin/Globulin Ratio 1.0 0.4 - 1.6     CBC with Auto Differential    Collection Time: 12/22/22  6:10 PM   Result Value Ref Range    WBC 5.9 4.3 - 11.1 K/uL    RBC 4.32 4.05 - 5.2 M/uL    Hemoglobin 12.4 11.7 - 15.4 g/dL    Hematocrit 38.4 35.8 - 46.3 %    MCV 88.9 82 - 102 FL    MCH 28.7 26.1 - 32.9 PG    MCHC 32.3 31.4 - 35.0 g/dL    RDW 14.5 11.9 - 14.6 %    Platelets 326 582 - 931 K/uL    MPV 9.6 9.4 - 12.3 FL    nRBC 0.00 0.0 - 0.2 K/uL    Differential Type AUTOMATED      Seg Neutrophils 69 43 - 78 %    Lymphocytes 21 13 - 44 %    Monocytes 9 4.0 - 12.0 %    Eosinophils % 1 0.5 - 7.8 %    Basophils 0 0.0 - 2.0 %    Immature Granulocytes 0 0.0 - 5.0 %    Segs Absolute 4.1 1.7 - 8.2 K/UL    Absolute Lymph # 1.2 0.5 - 4.6 K/UL    Absolute Mono # 0.5 0.1 - 1.3 K/UL    Absolute Eos # 0.0 0.0 - 0.8 K/UL    Basophils Absolute 0.0 0.0 - 0.2 K/UL    Absolute Immature Granulocyte 0.0 0.0 - 0.5 K/UL   POCT Urinalysis no Micro    Collection Time: 12/22/22  6:57 PM   Result Value Ref Range    Specific Gravity, Urine, POC >1.030 (H) 1.001 - 1.023    pH, Urine, POC 6.0 5.0 - 9.0      Protein, Urine, POC 30 (A) NEG mg/dL    Glucose, UA POC Negative NEG mg/dL    Ketones, Urine, POC Negative NEG mg/dL    Bilirubin, Urine, POC Negative NEG      Blood, UA POC LARGE (A) NEG      URINE UROBILINOGEN POC 0.2 0.2 - 1.0 EU/dL    Nitrate, Urine, POC Negative NEG      Leukocyte Est, UA POC Negative NEG      Performed by: Boubacar Peters    Culture, Urine    Collection Time: 12/23/22 10:00 AM    Specimen: Urine, clean catch   Result Value Ref Range    Special Requests NO SPECIAL REQUESTS      Culture        No growth after short period of incubation. Further results to follow after overnight incubation.    Urine Drug Screen    Collection Time: 12/23/22  5:00 PM   Result Value Ref Range    PCP, Urine Negative NEG      Benzodiazepines, Urine Negative NEG      Cocaine, Urine Negative NEG      Amphetamine, Urine Positive (A) NEG      Methadone, Urine Negative NEG      THC, TH-Cannabinol, Urine Negative NEG      Opiates, Urine Positive (A) NEG      Barbiturates, Urine Negative NEG     Comprehensive Metabolic Panel w/ Reflex to MG    Collection Time: 12/24/22  6:02 AM   Result Value Ref Range    Sodium 144 (H) 133 - 143 mmol/L    Potassium 4.5 3.5 - 5.1 mmol/L    Chloride 111 (H) 101 - 110 mmol/L    CO2 30 21 - 32 mmol/L    Anion Gap 3 2 - 11 mmol/L    Glucose 90 65 - 100 mg/dL    BUN 14 6 - 23 MG/DL    Creatinine 0.70 0.6 - 1.0 MG/DL    Est, Glom Filt Rate >60 >60 ml/min/1.73m2    Calcium 8.3 8.3 - 10.4 MG/DL    Total Bilirubin 0.3 0.2 - 1.1 MG/DL    ALT 38 12 - 65 U/L    AST 17 15 - 37 U/L    Alk Phosphatase 114 50 - 136 U/L    Total Protein 5.2 (L) 6.3 - 8.2 g/dL    Albumin 2.7 (L) 3.5 - 5.0 g/dL    Globulin 2.5 (L) 2.8 - 4.5 g/dL    Albumin/Globulin Ratio 1.1 0.4 - 1.6     CBC with Auto Differential    Collection Time: 12/24/22  6:02 AM   Result Value Ref Range    WBC 3.8 (L) 4.3 - 11.1 K/uL    RBC 3.67 (L) 4.05 - 5.2 M/uL    Hemoglobin 10.5 (L) 11.7 - 15.4 g/dL    Hematocrit 33.6 (L) 35.8 - 46.3 %    MCV 91.6 82 - 102 FL    MCH 28.6 26.1 - 32.9 PG    MCHC 31.3 (L) 31.4 - 35.0 g/dL    RDW 14.6 11.9 - 14.6 %    Platelets 822 (L) 042 - 450 K/uL    MPV 9.3 (L) 9.4 - 12.3 FL    nRBC 0.00 0.0 - 0.2 K/uL    Differential Type AUTOMATED      Seg Neutrophils 42 (L) 43 - 78 %    Lymphocytes 44 13 - 44 %    Monocytes 11 4.0 - 12.0 %    Eosinophils % 2 0.5 - 7.8 %    Basophils 1 0.0 - 2.0 %    Immature Granulocytes 0 0.0 - 5.0 %    Segs Absolute 1.6 (L) 1.7 - 8.2 K/UL    Absolute Lymph # 1.7 0.5 - 4.6 K/UL    Absolute Mono # 0.4 0.1 - 1.3 K/UL    Absolute Eos # 0.1 0.0 - 0.8 K/UL    Basophils Absolute 0.0 0.0 - 0.2 K/UL    Absolute Immature Granulocyte 0.0 0.0 - 0.5 K/UL   Vancomycin Level, Random    Collection Time: 12/24/22  6:02 AM   Result Value Ref Range    Vancomycin Rm 16.6 UG/ML       I have personally reviewed imaging studies showing:   Other Studies:  No orders to display       Current Meds:  Current Facility-Administered Medications   Medication Dose Route Frequency    morphine injection 1 mg  1 mg IntraVENous Q4H PRN    0.9 % sodium chloride infusion   IntraVENous Continuous    traZODone (DESYREL) tablet 50 mg  50 mg Oral Nightly PRN    sodium chloride flush 0.9 % injection 5-40 mL  5-40 mL IntraVENous 2 times per day    sodium chloride flush 0.9 % injection 5-40 mL  5-40 mL IntraVENous PRN    0.9 % sodium chloride infusion   IntraVENous PRN    ondansetron (ZOFRAN-ODT) disintegrating tablet 4 mg  4 mg Oral Q8H PRN    Or    ondansetron (ZOFRAN) injection 4 mg  4 mg IntraVENous Q6H PRN    polyethylene glycol (GLYCOLAX) packet 17 g  17 g Oral Daily PRN    acetaminophen (TYLENOL) tablet 650 mg  650 mg Oral Q6H PRN    Or    acetaminophen (TYLENOL) suppository 650 mg  650 mg Rectal Q6H PRN    benztropine (COGENTIN) tablet 0.5 mg  0.5 mg Oral BID    busPIRone (BUSPAR) tablet 15 mg  15 mg Oral Daily    prazosin (MINIPRESS) capsule 2 mg  2 mg Oral Nightly    QUEtiapine (SEROQUEL) tablet 200 mg  200 mg Oral BID    OLANZapine (ZYPREXA) tablet 7.5 mg  7.5 mg Oral Nightly PRN    vancomycin (VANCOCIN) 750 mg in sodium chloride 0.9 % 250 mL IVPB (Thdf1Stb)  750 mg IntraVENous Q12H    sennosides-docusate sodium (SENOKOT-S) 8.6-50 MG tablet 2 tablet  2 tablet Oral Daily    traMADol (ULTRAM) tablet 50 mg  50 mg Oral Q6H PRN    OLANZapine (ZYPREXA) 5 mg in sterile water 1 mL injection  5 mg IntraMUSCular Q8H PRN    LORazepam (ATIVAN) injection 1 mg  1 mg IntraVENous Q6H PRN       Signed:  Flaquita Salazar MD    Part of this note may have been written by using a voice dictation software. The note has been proof read but may still contain some grammatical/other typographical errors.

## 2022-12-24 NOTE — PROGRESS NOTES
VANCO DAILY FOLLOW UP NOTE  [address] [facility] Pharmacokinetic Monitoring Service - Vancomycin    Consulting Provider: Dr. Darien Marte   Indication: SSTI  Target Concentration: Goal trough of 10-15 mg/L and AUC/ANDREA <500 mg*hr/L  Day of Therapy: 2  Additional Antimicrobials: none    Patient eligible for piperacillin-tazobactam to cefepime auto-substitution per P&T approved protocol? N/A    Pertinent Laboratory Values: Wt Readings from Last 1 Encounters:   12/22/22 110 lb (49.9 kg)     Temp Readings from Last 1 Encounters:   12/24/22 97.9 °F (36.6 °C) (Oral)     Recent Labs     12/22/22  1810 12/24/22  0602   BUN 12 14   CREATININE 1.00 0.70   WBC 5.9 3.8*     Estimated Creatinine Clearance: 84 mL/min (based on SCr of 0.7 mg/dL). Lab Results   Component Value Date/Time    VANCORANDOM 16.6 12/24/2022 06:02 AM       MRSA Nasal Swab: N/A. Non-respiratory infection      Assessment:  Date/Time Dose Concentration AUC   12/24 0609 750 mg q12h 16.6 431   Note: Serum concentrations collected for AUC dosing may appear elevated if collected in close proximity to the dose administered, this is not necessarily an indication of toxicity    Plan:  Current dosing regimen is therapeutic  Continue 750 mg q12h.   Move next dose forward to 1100  Repeat vancomycin concentrations will be ordered as clinically appropriate   Pharmacy will continue to monitor patient and adjust therapy as indicated    Thank you for the consult,  Carolina Roman, PharmD, BCOP  Clinical Pharmacist  Contact [employer]

## 2022-12-24 NOTE — PLAN OF CARE
Problem: Discharge Planning  Goal: Discharge to home or other facility with appropriate resources  12/24/2022 1040 by Rain Hamilton RN  Outcome: Progressing  12/24/2022 0009 by Mian Roberto RN  Outcome: Progressing  Flowsheets (Taken 12/23/2022 1940)  Discharge to home or other facility with appropriate resources: Identify barriers to discharge with patient and caregiver     Problem: ABCDS Injury Assessment  Goal: Absence of physical injury  12/24/2022 1040 by Rain Hamilton RN  Outcome: Progressing  12/24/2022 0009 by Mian Roberto RN  Outcome: Progressing     Problem: Pain  Goal: Verbalizes/displays adequate comfort level or baseline comfort level  12/24/2022 1040 by Rain Hamilton RN  Outcome: Progressing  12/24/2022 0009 by Mian Roberto RN  Outcome: Progressing     Problem: Safety - Adult  Goal: Free from fall injury  12/24/2022 1040 by Rain Hamilton RN  Outcome: Iesha How (Taken 12/24/2022 0725)  Free From Fall Injury: Instruct family/caregiver on patient safety  12/24/2022 0009 by Mian Roberto RN  Outcome: Progressing  Flowsheets (Taken 12/23/2022 1940)  Free From Fall Injury: Instruct family/caregiver on patient safety

## 2022-12-24 NOTE — PROGRESS NOTES
END OF SHIFT NOTE:    INTAKE/OUTPUT  12/23 0701 - 12/24 0700  In: 2807 [P.O.:582; I.V.:1064]  Out: 2600 [Urine:2600]  Voiding: yes  Catheter: no  Drain:              Flatus: Patient cannot tell me if she does have flatus present. Stool:  0 occurrences. Characteristics:       Emesis: 0 occurrences. Characteristics:        VITAL SIGNS  Patient Vitals for the past 12 hrs:   Temp Pulse Resp BP SpO2   12/24/22 0106 98.2 °F (36.8 °C) 76 16 118/66 --   12/23/22 2208 -- -- 20 -- --   12/23/22 2201 -- -- -- 120/70 --   12/23/22 1940 97.9 °F (36.6 °C) 91 18 104/63 99 %       Pain Assessment                Ambulating  Yes  Slept long periods last night. Remains uncooperative when attempts made to do vs (batting hands at staff.)    Shift report given to oncoming nurse at the bedside.     Marcus Mustafa RN

## 2022-12-24 NOTE — PROCEDURES
END OF SHIFT NOTE:    INTAKE/OUTPUT  12/23 0701 - 12/24 0700  In: 9747 [P.O.:582; I.V.:1064]  Out: 2600 [Urine:2600]  Voiding: Yes  Catheter: No  Drain:              Flatus: Patient does have flatus present. Stool: 0 occurrences. Characteristics:  Stool Appearance: Unable to assess  Stool Color: Unable to assess  Stool Amount: Unable to assess  Stool Assessment  Stool Appearance: Unable to assess  Stool Color: Unable to assess  Stool Amount: Unable to assess  Last BM (including prior to admit): 12/22/22 (per patient)    Emesis: 0 occurrences. Characteristics:        VITAL SIGNS  Patient Vitals for the past 12 hrs:   Temp Pulse Resp BP SpO2   12/24/22 1649 -- -- 19 -- --   12/24/22 1514 97.3 °F (36.3 °C) 77 20 109/70 100 %   12/24/22 1414 -- -- 18 -- --   12/24/22 1341 -- -- -- 127/77 --   12/24/22 1207 -- -- 19 -- --   12/24/22 1103 -- 85 -- 108/72 94 %   12/24/22 1102 98.4 °F (36.9 °C) 86 18 (!) 98/58 99 %   12/24/22 0707 97.9 °F (36.6 °C) 77 18 111/80 99 %       Pain Assessment  Pain Level: 7 (12/24/22 1615)  Pain Location: Finger (Comment which one)  Patient's Stated Pain Goal: 0 - No pain    Ambulating  Yes    Shift report given to oncoming nurse at the bedside.     Raquel Givens, RN

## 2022-12-25 LAB
ANION GAP SERPL CALC-SCNC: 3 MMOL/L (ref 2–11)
BASOPHILS # BLD: 0 K/UL (ref 0–0.2)
BASOPHILS NFR BLD: 1 % (ref 0–2)
BUN SERPL-MCNC: 12 MG/DL (ref 6–23)
CALCIUM SERPL-MCNC: 8.1 MG/DL (ref 8.3–10.4)
CHLORIDE SERPL-SCNC: 108 MMOL/L (ref 101–110)
CO2 SERPL-SCNC: 30 MMOL/L (ref 21–32)
CREAT SERPL-MCNC: 1 MG/DL (ref 0.6–1)
DIFFERENTIAL METHOD BLD: ABNORMAL
EOSINOPHIL # BLD: 0.1 K/UL (ref 0–0.8)
EOSINOPHIL NFR BLD: 3 % (ref 0.5–7.8)
ERYTHROCYTE [DISTWIDTH] IN BLOOD BY AUTOMATED COUNT: 14.3 % (ref 11.9–14.6)
GLUCOSE SERPL-MCNC: 98 MG/DL (ref 65–100)
HCT VFR BLD AUTO: 36.8 % (ref 35.8–46.3)
HGB BLD-MCNC: 11.5 G/DL (ref 11.7–15.4)
IMM GRANULOCYTES # BLD AUTO: 0 K/UL (ref 0–0.5)
IMM GRANULOCYTES NFR BLD AUTO: 0 % (ref 0–5)
LYMPHOCYTES # BLD: 1.4 K/UL (ref 0.5–4.6)
LYMPHOCYTES NFR BLD: 38 % (ref 13–44)
MCH RBC QN AUTO: 28.9 PG (ref 26.1–32.9)
MCHC RBC AUTO-ENTMCNC: 31.3 G/DL (ref 31.4–35)
MCV RBC AUTO: 92.5 FL (ref 82–102)
MONOCYTES # BLD: 0.5 K/UL (ref 0.1–1.3)
MONOCYTES NFR BLD: 13 % (ref 4–12)
NEUTS SEG # BLD: 1.7 K/UL (ref 1.7–8.2)
NEUTS SEG NFR BLD: 45 % (ref 43–78)
NRBC # BLD: 0 K/UL (ref 0–0.2)
PLATELET # BLD AUTO: 166 K/UL (ref 150–450)
PMV BLD AUTO: 9.5 FL (ref 9.4–12.3)
POTASSIUM SERPL-SCNC: 4 MMOL/L (ref 3.5–5.1)
RBC # BLD AUTO: 3.98 M/UL (ref 4.05–5.2)
SODIUM SERPL-SCNC: 141 MMOL/L (ref 133–143)
WBC # BLD AUTO: 3.7 K/UL (ref 4.3–11.1)

## 2022-12-25 PROCEDURE — 36415 COLL VENOUS BLD VENIPUNCTURE: CPT

## 2022-12-25 PROCEDURE — 6370000000 HC RX 637 (ALT 250 FOR IP): Performed by: INTERNAL MEDICINE

## 2022-12-25 PROCEDURE — 85025 COMPLETE CBC W/AUTO DIFF WBC: CPT

## 2022-12-25 PROCEDURE — 1100000000 HC RM PRIVATE

## 2022-12-25 PROCEDURE — 80048 BASIC METABOLIC PNL TOTAL CA: CPT

## 2022-12-25 PROCEDURE — 2580000003 HC RX 258: Performed by: INTERNAL MEDICINE

## 2022-12-25 PROCEDURE — 6370000000 HC RX 637 (ALT 250 FOR IP): Performed by: HOSPITALIST

## 2022-12-25 PROCEDURE — 6370000000 HC RX 637 (ALT 250 FOR IP): Performed by: ORTHOPAEDIC SURGERY

## 2022-12-25 PROCEDURE — 6360000002 HC RX W HCPCS: Performed by: INTERNAL MEDICINE

## 2022-12-25 RX ORDER — L. ACIDOPHILUS/L.BULGARICUS 1MM CELL
4 TABLET ORAL 2 TIMES DAILY
Status: DISCONTINUED | OUTPATIENT
Start: 2022-12-25 | End: 2022-12-26 | Stop reason: HOSPADM

## 2022-12-25 RX ORDER — HYDROCODONE BITARTRATE AND ACETAMINOPHEN 5; 325 MG/1; MG/1
1 TABLET ORAL ONCE
Status: DISCONTINUED | OUTPATIENT
Start: 2022-12-25 | End: 2022-12-26 | Stop reason: HOSPADM

## 2022-12-25 RX ORDER — DOXYCYCLINE HYCLATE 100 MG/1
100 CAPSULE ORAL EVERY 12 HOURS SCHEDULED
Status: DISCONTINUED | OUTPATIENT
Start: 2022-12-25 | End: 2022-12-26 | Stop reason: HOSPADM

## 2022-12-25 RX ORDER — CEPHALEXIN 250 MG/1
250 CAPSULE ORAL EVERY 6 HOURS SCHEDULED
Status: DISCONTINUED | OUTPATIENT
Start: 2022-12-25 | End: 2022-12-26 | Stop reason: HOSPADM

## 2022-12-25 RX ADMIN — CEPHALEXIN 250 MG: 250 CAPSULE ORAL at 17:49

## 2022-12-25 RX ADMIN — SODIUM CHLORIDE, PRESERVATIVE FREE 10 ML: 5 INJECTION INTRAVENOUS at 13:47

## 2022-12-25 RX ADMIN — LACTOBACILLUS TAB 4 TABLET: TAB at 14:02

## 2022-12-25 RX ADMIN — LACTOBACILLUS TAB 4 TABLET: TAB at 20:55

## 2022-12-25 RX ADMIN — BENZTROPINE MESYLATE 0.5 MG: 1 TABLET ORAL at 13:58

## 2022-12-25 RX ADMIN — MORPHINE SULFATE 1 MG: 2 INJECTION, SOLUTION INTRAMUSCULAR; INTRAVENOUS at 11:13

## 2022-12-25 RX ADMIN — QUETIAPINE FUMARATE 200 MG: 100 TABLET ORAL at 09:03

## 2022-12-25 RX ADMIN — CEPHALEXIN 250 MG: 250 CAPSULE ORAL at 13:58

## 2022-12-25 RX ADMIN — BENZTROPINE MESYLATE 0.5 MG: 1 TABLET ORAL at 20:54

## 2022-12-25 RX ADMIN — DOXYCYCLINE HYCLATE 100 MG: 100 CAPSULE ORAL at 13:58

## 2022-12-25 RX ADMIN — QUETIAPINE FUMARATE 200 MG: 100 TABLET ORAL at 20:54

## 2022-12-25 RX ADMIN — SENNOSIDES AND DOCUSATE SODIUM 2 TABLET: 8.6; 5 TABLET ORAL at 09:04

## 2022-12-25 RX ADMIN — BUSPIRONE HYDROCHLORIDE 15 MG: 5 TABLET ORAL at 09:03

## 2022-12-25 RX ADMIN — TRAMADOL HYDROCHLORIDE 50 MG: 50 TABLET ORAL at 06:13

## 2022-12-25 RX ADMIN — TRAMADOL HYDROCHLORIDE 50 MG: 50 TABLET ORAL at 17:49

## 2022-12-25 RX ADMIN — MORPHINE SULFATE 1 MG: 2 INJECTION, SOLUTION INTRAMUSCULAR; INTRAVENOUS at 04:07

## 2022-12-25 RX ADMIN — PRAZOSIN HYDROCHLORIDE 1 MG: 1 CAPSULE ORAL at 21:25

## 2022-12-25 RX ADMIN — DOXYCYCLINE HYCLATE 100 MG: 100 CAPSULE ORAL at 20:54

## 2022-12-25 ASSESSMENT — PAIN DESCRIPTION - DESCRIPTORS
DESCRIPTORS: THROBBING
DESCRIPTORS: THROBBING

## 2022-12-25 ASSESSMENT — PAIN DESCRIPTION - ORIENTATION
ORIENTATION: RIGHT

## 2022-12-25 ASSESSMENT — PAIN DESCRIPTION - FREQUENCY: FREQUENCY: CONTINUOUS

## 2022-12-25 ASSESSMENT — PAIN DESCRIPTION - ONSET: ONSET: ON-GOING

## 2022-12-25 ASSESSMENT — PAIN SCALES - GENERAL
PAINLEVEL_OUTOF10: 0
PAINLEVEL_OUTOF10: 6
PAINLEVEL_OUTOF10: 8
PAINLEVEL_OUTOF10: 5

## 2022-12-25 ASSESSMENT — PAIN DESCRIPTION - LOCATION
LOCATION: FINGER (COMMENT WHICH ONE)

## 2022-12-25 ASSESSMENT — PAIN DESCRIPTION - PAIN TYPE: TYPE: ACUTE PAIN

## 2022-12-25 NOTE — PROGRESS NOTES
Assumed care of patient at ; report received from Antonieta Camarena RN. Patient lying quietly in bed with eyes closed at this time.

## 2022-12-25 NOTE — PROGRESS NOTES
Hospitalist Progress Note   Admit Date:  2022  5:30 PM   Name:  Kavitha Lanier   Age:  38 y.o. Sex:  female  :  1980   MRN:  [id number]   Room:  219/    Presenting Complaint: Post-op Problem     Reason(s) for Admission: Wound infection [T14. 8XXA, L08.9]  Finger infection [L08.9]     Hospital Course: This is a 82-year-old female with past medical history significant for right index finger infection status post I&D in . [place] She has wound cultures positive for MRSA. She was treated with IV vancomycin for 5 days and then was given prescription for doxycycline. She was lost to follow-up. She also has not filled her antibiotics. She has been having worsening pain in her right index finger and therefore presented to the ER. Patient was admitted to hospitalist service and orthopedics consulted. No plans for debridement per orthopedics. Recommends continuing IV antibiotics for 48 hours. Subjective & 24hr Events (22): Patient reports pain in her index finger. No fever no chills. No shortness of breath. No chest pain. No nausea no vomiting. Assessment & Plan: This is a 82-year-old female with    Right index finger infection/MRSA  Orthopedics consulted. Does not recommend another surgical debridement. IV vancomycin switched to p.o. Keflex and doxycycline. Encouraged patient to be compliant with medications. Pain management per pain scale. Posttraumatic stress disorder/polysubstance use  Resume home medications. Patient does not want to take her Depakote. Urine drug screen positive for amphetamines. Counseled to quit. Continue Seroquel, Cogentin, trazodone as needed      Anticipated discharge needs:   Anticipate discharge home tomorrow. Social issues. Case management consulted. Diet:  ADULT DIET; Regular  DVT PPx: SCDs  Code status: Full Code    Hospital Problems:  Principal Problem:    Wound infection  Resolved Problems:    * No resolved hospital problems. *      Objective:   Patient Vitals for the past 24 hrs:   Temp Pulse Resp BP SpO2   12/25/22 1125 -- 82 -- 92/67 97 %   12/25/22 1123 98.6 °F (37 °C) 78 20 (!) 94/50 98 %   12/25/22 0748 -- 85 -- (!) 95/57 98 %   12/25/22 0747 98.2 °F (36.8 °C) 88 18 (!) 99/52 98 %   12/25/22 0444 97.9 °F (36.6 °C) 80 18 124/74 99 %   12/25/22 0437 -- -- 18 -- --   12/24/22 2100 -- -- 17 -- --   12/24/22 2019 97.7 °F (36.5 °C) 79 19 119/80 100 %   12/24/22 1649 -- -- 19 -- --   12/24/22 1514 97.3 °F (36.3 °C) 77 20 109/70 100 %       Oxygen Therapy  SpO2: 97 %  Pulse Oximetry Type: Intermittent  O2 Device: None (Room air)  Oxygen Therapy: None (Room air)    Estimated body mass index is 19.49 kg/m² as calculated from the following:    Height as of this encounter: 5' 3\" (1.6 m). Weight as of this encounter: 110 lb (49.9 kg). Intake/Output Summary (Last 24 hours) at 12/25/2022 1416  Last data filed at 12/25/2022 0945  Gross per 24 hour   Intake 2864.56 ml   Output 1000 ml   Net 1864.56 ml         Physical Exam:     Blood pressure 92/67, pulse 82, temperature 98.6 °F (37 °C), temperature source Oral, resp. rate 20, height 5' 3\" (1.6 m), weight 110 lb (49.9 kg), SpO2 97 %. General:    Well nourished. Alert awake female, appears older than stated age, chronically ill-appearing,  Head:  Normocephalic, atraumatic  Eyes:  Sclerae appear normal.  Pupils equally round. ENT:  Nares appear normal, no drainage. Moist oral mucosa  Neck:  No restricted ROM. Trachea midline   CV:   RRR. No m/r/g. No jugular venous distension. Lungs:   CTAB. No wheezing, rhonchi, or rales. Symmetric expansion. Abdomen: Bowel sounds present. Soft, nontender, nondistended. Extremities: No cyanosis or clubbing. Improved Erythema, edema right index finger with no drainage, with decreased range of motion. Skin:     No rashes and normal coloration. Warm and dry. Neuro:  CN II-XII grossly intact. Sensation intact.   A&Ox3  Psych:  Normal mood and affect.       I have personally reviewed labs and tests showing:  Recent Labs:  Recent Results (from the past 48 hour(s))   Urine Drug Screen    Collection Time: 12/23/22  5:00 PM   Result Value Ref Range    PCP, Urine Negative NEG      Benzodiazepines, Urine Negative NEG      Cocaine, Urine Negative NEG      Amphetamine, Urine Positive (A) NEG      Methadone, Urine Negative NEG      THC, TH-Cannabinol, Urine Negative NEG      Opiates, Urine Positive (A) NEG      Barbiturates, Urine Negative NEG     Comprehensive Metabolic Panel w/ Reflex to MG    Collection Time: 12/24/22  6:02 AM   Result Value Ref Range    Sodium 144 (H) 133 - 143 mmol/L    Potassium 4.5 3.5 - 5.1 mmol/L    Chloride 111 (H) 101 - 110 mmol/L    CO2 30 21 - 32 mmol/L    Anion Gap 3 2 - 11 mmol/L    Glucose 90 65 - 100 mg/dL    BUN 14 6 - 23 MG/DL    Creatinine 0.70 0.6 - 1.0 MG/DL    Est, Glom Filt Rate >60 >60 ml/min/1.73m2    Calcium 8.3 8.3 - 10.4 MG/DL    Total Bilirubin 0.3 0.2 - 1.1 MG/DL    ALT 38 12 - 65 U/L    AST 17 15 - 37 U/L    Alk Phosphatase 114 50 - 136 U/L    Total Protein 5.2 (L) 6.3 - 8.2 g/dL    Albumin 2.7 (L) 3.5 - 5.0 g/dL    Globulin 2.5 (L) 2.8 - 4.5 g/dL    Albumin/Globulin Ratio 1.1 0.4 - 1.6     CBC with Auto Differential    Collection Time: 12/24/22  6:02 AM   Result Value Ref Range    WBC 3.8 (L) 4.3 - 11.1 K/uL    RBC 3.67 (L) 4.05 - 5.2 M/uL    Hemoglobin 10.5 (L) 11.7 - 15.4 g/dL    Hematocrit 33.6 (L) 35.8 - 46.3 %    MCV 91.6 82 - 102 FL    MCH 28.6 26.1 - 32.9 PG    MCHC 31.3 (L) 31.4 - 35.0 g/dL    RDW 14.6 11.9 - 14.6 %    Platelets 709 (L) 668 - 450 K/uL    MPV 9.3 (L) 9.4 - 12.3 FL    nRBC 0.00 0.0 - 0.2 K/uL    Differential Type AUTOMATED      Seg Neutrophils 42 (L) 43 - 78 %    Lymphocytes 44 13 - 44 %    Monocytes 11 4.0 - 12.0 %    Eosinophils % 2 0.5 - 7.8 %    Basophils 1 0.0 - 2.0 %    Immature Granulocytes 0 0.0 - 5.0 %    Segs Absolute 1.6 (L) 1.7 - 8.2 K/UL    Absolute Lymph # 1.7 0.5 - 4.6 K/UL Absolute Mono # 0.4 0.1 - 1.3 K/UL    Absolute Eos # 0.1 0.0 - 0.8 K/UL    Basophils Absolute 0.0 0.0 - 0.2 K/UL    Absolute Immature Granulocyte 0.0 0.0 - 0.5 K/UL   Vancomycin Level, Random    Collection Time: 12/24/22  6:02 AM   Result Value Ref Range    Vancomycin Rm 16.6 UG/ML   CBC with Auto Differential    Collection Time: 12/25/22  7:10 AM   Result Value Ref Range    WBC 3.7 (L) 4.3 - 11.1 K/uL    RBC 3.98 (L) 4.05 - 5.2 M/uL    Hemoglobin 11.5 (L) 11.7 - 15.4 g/dL    Hematocrit 36.8 35.8 - 46.3 %    MCV 92.5 82 - 102 FL    MCH 28.9 26.1 - 32.9 PG    MCHC 31.3 (L) 31.4 - 35.0 g/dL    RDW 14.3 11.9 - 14.6 %    Platelets 859 775 - 818 K/uL    MPV 9.5 9.4 - 12.3 FL    nRBC 0.00 0.0 - 0.2 K/uL    Differential Type AUTOMATED      Seg Neutrophils 45 43 - 78 %    Lymphocytes 38 13 - 44 %    Monocytes 13 (H) 4.0 - 12.0 %    Eosinophils % 3 0.5 - 7.8 %    Basophils 1 0.0 - 2.0 %    Immature Granulocytes 0 0.0 - 5.0 %    Segs Absolute 1.7 1.7 - 8.2 K/UL    Absolute Lymph # 1.4 0.5 - 4.6 K/UL    Absolute Mono # 0.5 0.1 - 1.3 K/UL    Absolute Eos # 0.1 0.0 - 0.8 K/UL    Basophils Absolute 0.0 0.0 - 0.2 K/UL    Absolute Immature Granulocyte 0.0 0.0 - 0.5 K/UL   Basic Metabolic Panel w/ Reflex to MG    Collection Time: 12/25/22  7:10 AM   Result Value Ref Range    Sodium 141 133 - 143 mmol/L    Potassium 4.0 3.5 - 5.1 mmol/L    Chloride 108 101 - 110 mmol/L    CO2 30 21 - 32 mmol/L    Anion Gap 3 2 - 11 mmol/L    Glucose 98 65 - 100 mg/dL    BUN 12 6 - 23 MG/DL    Creatinine 1.00 0.6 - 1.0 MG/DL    Est, Glom Filt Rate >60 >60 ml/min/1.73m2    Calcium 8.1 (L) 8.3 - 10.4 MG/DL       I have personally reviewed imaging studies showing:   Other Studies:  No orders to display       Current Meds:  Current Facility-Administered Medications   Medication Dose Route Frequency    cephALEXin (KEFLEX) capsule 250 mg  250 mg Oral 4 times per day    HYDROcodone-acetaminophen (NORCO) 5-325 MG per tablet 1 tablet  1 tablet Oral Once doxycycline hyclate (VIBRAMYCIN) capsule 100 mg  100 mg Oral 2 times per day    lactobacillus acidophilus (FLORANEX) 4 tablet  4 tablet Oral BID    morphine injection 1 mg  1 mg IntraVENous Q4H PRN    traZODone (DESYREL) tablet 50 mg  50 mg Oral Nightly PRN    sodium chloride flush 0.9 % injection 5-40 mL  5-40 mL IntraVENous 2 times per day    sodium chloride flush 0.9 % injection 5-40 mL  5-40 mL IntraVENous PRN    0.9 % sodium chloride infusion   IntraVENous PRN    ondansetron (ZOFRAN-ODT) disintegrating tablet 4 mg  4 mg Oral Q8H PRN    Or    ondansetron (ZOFRAN) injection 4 mg  4 mg IntraVENous Q6H PRN    polyethylene glycol (GLYCOLAX) packet 17 g  17 g Oral Daily PRN    acetaminophen (TYLENOL) tablet 650 mg  650 mg Oral Q6H PRN    Or    acetaminophen (TYLENOL) suppository 650 mg  650 mg Rectal Q6H PRN    benztropine (COGENTIN) tablet 0.5 mg  0.5 mg Oral BID    busPIRone (BUSPAR) tablet 15 mg  15 mg Oral Daily    prazosin (MINIPRESS) capsule 2 mg  2 mg Oral Nightly    QUEtiapine (SEROQUEL) tablet 200 mg  200 mg Oral BID    OLANZapine (ZYPREXA) tablet 7.5 mg  7.5 mg Oral Nightly PRN    sennosides-docusate sodium (SENOKOT-S) 8.6-50 MG tablet 2 tablet  2 tablet Oral Daily    traMADol (ULTRAM) tablet 50 mg  50 mg Oral Q6H PRN    OLANZapine (ZYPREXA) 5 mg in sterile water 1 mL injection  5 mg IntraMUSCular Q8H PRN    LORazepam (ATIVAN) injection 1 mg  1 mg IntraVENous Q6H PRN       Signed:  Jacque Giordano MD    Part of this note may have been written by using a voice dictation software. The note has been proof read but may still contain some grammatical/other typographical errors.

## 2022-12-25 NOTE — PROGRESS NOTES
12/24/22    Shift Note  12/24/22 - PM    1915hrs: Entered patient room with day-shift RN to complete bedside shift report. Found patient pressing buttons on the IV infusion pump. RN instructed patient to please use the call light and not to push buttons on the infusion pump; Patient became belligerent. Patient refused physical assessment. When asked her pain level, she stated \"i'm fine. Bye.\" The RN asked if there was anything they could do prior to exiting the room, and the Patient stated, \"Give me my pain meds at seven thirty and whenever I can have them after that is all. \" RN educated patient about the pain rating scale, with no evidence of learning noted. 1920hrs: RN answered call light, and again found patient attempting to stop the IV infusion pump which was running KVO fluid. Patient continues to be verbally abusive to staff. IV was disconnected at patient's request.    2015hrs: Patient now calm and allowed RN to complete limited assessment (wound on right finger only). Patient education about medications, fall precautions, and pain assessment was completed. Patient allowed RN to assess and re-bandage the wound on her right finger per the nursing order. Wound had a moderate amount of yellow/tan drainage; wound margins bright red; moderate swelling noted to entire right hand. 12/25/22    0000hrs: No changes to prior assessment. Patient resting comfortably in bed.    0208hrs: RN handoff report given to Nikki Santos [facility] [address]. Patient resting comfortably in bed.

## 2022-12-25 NOTE — PROGRESS NOTES
Shift summary  A/Ox4. Poor judgement. VSS on RA. Medicated 3x for moderate to severe pain. IVF infusing @ [facility]. Up as tolerated in room with bathroom privileges. Patient argumentative, and irritable at times. Bed low and locked, call light within reach. Rounded per policy. Will continue to monitor.

## 2022-12-25 NOTE — PROGRESS NOTES
Progress Note  Date:2022       Room:219/01  Patient Name:[facility]     YOB: 1980     Age:42 y.o. Subjective    Subjective complaint of right index finger and hand pain  Review of Systems  Objective         Vitals Last 24 Hours:  TEMPERATURE:  Temp  Av.9 °F (36.6 °C)  Min: 97.3 °F (36.3 °C)  Max: 98.4 °F (36.9 °C)  RESPIRATIONS RANGE: Resp  Av.4  Min: 17  Max: 20  PULSE OXIMETRY RANGE: SpO2  Av.3 %  Min: 94 %  Max: 100 %  PULSE RANGE: Pulse  Av.9  Min: 77  Max: 88  BLOOD PRESSURE RANGE: Systolic (38HJZ), BFH:911 , Min:95 , EJZ:067   ; Diastolic (22ZGR), XBK:42, Min:52, Max:80    I/O (24Hr): Intake/Output Summary (Last 24 hours) at 2022 0842  Last data filed at 2022 9287  Gross per 24 hour   Intake 3359.56 ml   Output 1000 ml   Net 2359.56 ml     Objective dressing on right index finger. No drainage. No redness or swelling of wrist or fore arm. Labs/Imaging/Diagnostics    Labs:  CBC:  Recent Labs     22  1810 22  0602 22  0710   WBC 5.9 3.8* 3.7*   RBC 4.32 3.67* 3.98*   HGB 12.4 10.5* 11.5*   HCT 38.4 33.6* 36.8   MCV 88.9 91.6 92.5   RDW 14.5 14.6 14.3    146* 166     CHEMISTRIES:  Recent Labs     22  1810 22  0602 22  0710    144* 141   K 3.1* 4.5 4.0    111* 108   CO2 26 30 30   BUN 12 14 12   CREATININE 1.00 0.70 1.00   GLUCOSE 126* 90 98     PT/INR:No results for input(s): PROTIME, INR in the last 72 hours. APTT:No results for input(s): APTT in the last 72 hours. LIVER PROFILE:  Recent Labs     22  1810 22  0602   AST 37 17   ALT 73* 38   BILITOT 0.5 0.3   ALKPHOS 163* 114       Imaging Last 24 Hours:  No results found. Assessment//Plan           Hospital Problems             Last Modified POA    * (Principal) Wound infection 2022 Yes     Assessment & Plan  Vancomycin now will switch to oral keflex but patient has no way to get this on [place].  Will delay discharge until tomorrow  Electronically signed by Zane Torres MD on 12/25/22 at 8:42 AM EST

## 2022-12-26 VITALS
HEART RATE: 80 BPM | TEMPERATURE: 98.2 F | HEIGHT: 63 IN | BODY MASS INDEX: 19.49 KG/M2 | RESPIRATION RATE: 18 BRPM | DIASTOLIC BLOOD PRESSURE: 72 MMHG | SYSTOLIC BLOOD PRESSURE: 106 MMHG | OXYGEN SATURATION: 99 % | WEIGHT: 110 LBS

## 2022-12-26 LAB
BACTERIA SPEC CULT: NORMAL
SERVICE CMNT-IMP: NORMAL

## 2022-12-26 PROCEDURE — 6370000000 HC RX 637 (ALT 250 FOR IP): Performed by: INTERNAL MEDICINE

## 2022-12-26 PROCEDURE — 6370000000 HC RX 637 (ALT 250 FOR IP): Performed by: HOSPITALIST

## 2022-12-26 PROCEDURE — 6370000000 HC RX 637 (ALT 250 FOR IP): Performed by: ORTHOPAEDIC SURGERY

## 2022-12-26 RX ORDER — FLUCONAZOLE 100 MG/1
150 TABLET ORAL ONCE
Status: COMPLETED | OUTPATIENT
Start: 2022-12-26 | End: 2022-12-26

## 2022-12-26 RX ORDER — HYDROCODONE BITARTRATE AND ACETAMINOPHEN 5; 325 MG/1; MG/1
1 TABLET ORAL EVERY 6 HOURS PRN
Qty: 20 TABLET | Refills: 0 | Status: SHIPPED | OUTPATIENT
Start: 2022-12-26 | End: 2022-12-31

## 2022-12-26 RX ORDER — HYDROCODONE BITARTRATE AND ACETAMINOPHEN 5; 325 MG/1; MG/1
1 TABLET ORAL EVERY 6 HOURS PRN
Qty: 20 TABLET | Refills: 0 | Status: SHIPPED | OUTPATIENT
Start: 2022-12-26 | End: 2022-12-26 | Stop reason: SDUPTHER

## 2022-12-26 RX ORDER — CEPHALEXIN 250 MG/1
250 CAPSULE ORAL 4 TIMES DAILY
Qty: 56 CAPSULE | Refills: 0 | Status: SHIPPED | OUTPATIENT
Start: 2022-12-26 | End: 2023-01-09

## 2022-12-26 RX ORDER — CEPHALEXIN 250 MG/1
250 CAPSULE ORAL 4 TIMES DAILY
Qty: 56 CAPSULE | Refills: 0 | Status: SHIPPED | OUTPATIENT
Start: 2022-12-26 | End: 2022-12-26 | Stop reason: SDUPTHER

## 2022-12-26 RX ADMIN — BENZTROPINE MESYLATE 0.5 MG: 1 TABLET ORAL at 09:47

## 2022-12-26 RX ADMIN — DOXYCYCLINE HYCLATE 100 MG: 100 CAPSULE ORAL at 09:47

## 2022-12-26 RX ADMIN — TRAMADOL HYDROCHLORIDE 50 MG: 50 TABLET ORAL at 12:41

## 2022-12-26 RX ADMIN — BUSPIRONE HYDROCHLORIDE 15 MG: 5 TABLET ORAL at 09:47

## 2022-12-26 RX ADMIN — CEPHALEXIN 250 MG: 250 CAPSULE ORAL at 05:30

## 2022-12-26 RX ADMIN — CEPHALEXIN 250 MG: 250 CAPSULE ORAL at 12:41

## 2022-12-26 RX ADMIN — FLUCONAZOLE 150 MG: 100 TABLET ORAL at 13:46

## 2022-12-26 RX ADMIN — CEPHALEXIN 250 MG: 250 CAPSULE ORAL at 00:25

## 2022-12-26 ASSESSMENT — PAIN DESCRIPTION - ORIENTATION: ORIENTATION: RIGHT

## 2022-12-26 ASSESSMENT — PAIN DESCRIPTION - LOCATION: LOCATION: FINGER (COMMENT WHICH ONE);HAND

## 2022-12-26 ASSESSMENT — PAIN SCALES - GENERAL
PAINLEVEL_OUTOF10: 0
PAINLEVEL_OUTOF10: 5

## 2022-12-26 NOTE — PROGRESS NOTES
END OF SHIFT NOTE:    INTAKE/OUTPUT  12/25 0701 - 12/26 0700  In: 1110 [P.O.:1110]  Out: 700 [Urine:700]  Voiding: Yes  Catheter: No  Drain:              Flatus: Patient does have flatus present. Stool: 0 occurrences. Characteristics:  Stool Appearance: Unable to assess  Stool Color: Unable to assess  Stool Amount: Unable to assess  Stool Assessment  Stool Appearance: Unable to assess  Stool Color: Unable to assess  Stool Amount: Unable to assess  Last BM (including prior to admit): 12/22/22 (per pt)    Emesis:  0 occurrences. Characteristics:        VITAL SIGNS  Patient Vitals for the past 12 hrs:   Temp Pulse Resp BP SpO2   12/26/22 0349 98 °F (36.7 °C) 65 18 (!) 106/58 98 %   12/26/22 0017 98.1 °F (36.7 °C) 70 18 110/62 99 %   12/25/22 2047 98.2 °F (36.8 °C) 67 18 (!) 101/53 99 %       Pain Assessment  Pain Level: 0 (12/26/22 0230)  Pain Location: Finger (Comment which one)  Patient's Stated Pain Goal: 0 - No pain    Ambulating  Yes    Shift report given to oncoming nurse at the bedside.     Ofelia Alva, RN

## 2022-12-26 NOTE — PROGRESS NOTES
2022         Post Op day: * No surgery found *     Admit Date: 2022  Admit Diagnosis: Wound infection [T14. 8XXA, L08.9]  Finger infection [L08.9]  No surgery found  No surgery found    Subjective: Doing well, No SOB, No Chest Pain, No Nausea or Vomitting. Does note continued right index finger pain. No major changes overnight. PT/OT:         Ambulation Response:  Tolerated well                     BMP:  Recent Labs     22  0602 22  0710   BUN 14 12   * 141   K 4.5 4.0   * 108   CO2 30 30     Patient Vitals for the past 8 hrs:   BP Temp Temp src Pulse Resp SpO2   22 0741 98/60 97.3 °F (36.3 °C) Oral 75 18 98 %   22 0349 (!) 106/58 98 °F (36.7 °C) Oral 65 18 98 %     Temp (24hrs), Av.9 °F (36.6 °C), Min:97.3 °F (36.3 °C), Max:98.6 °F (37 °C)    CBC:  Recent Labs     22  0602 22  0710   WBC 3.8* 3.7*   HGB 10.5* 11.5*   HCT 33.6* 36.8   * 166       Microbiology:     Recent Results (from the past 72 hour(s))   Culture, Urine    Collection Time: 22 10:00 AM    Specimen: Urine, clean catch   Result Value Ref Range    Special Requests NO SPECIAL REQUESTS      Culture <10,000 COLONIES/mL NORMAL SKIN OTILIA ISOLATED     Urine Drug Screen    Collection Time: 22  5:00 PM   Result Value Ref Range    PCP, Urine Negative NEG      Benzodiazepines, Urine Negative NEG      Cocaine, Urine Negative NEG      Amphetamine, Urine Positive (A) NEG      Methadone, Urine Negative NEG      THC, TH-Cannabinol, Urine Negative NEG      Opiates, Urine Positive (A) NEG      Barbiturates, Urine Negative NEG     Comprehensive Metabolic Panel w/ Reflex to MG    Collection Time: 22  6:02 AM   Result Value Ref Range    Sodium 144 (H) 133 - 143 mmol/L    Potassium 4.5 3.5 - 5.1 mmol/L    Chloride 111 (H) 101 - 110 mmol/L    CO2 30 21 - 32 mmol/L    Anion Gap 3 2 - 11 mmol/L    Glucose 90 65 - 100 mg/dL    BUN 14 6 - 23 MG/DL    Creatinine 0.70 0.6 - 1.0 MG/DL    Est, Glom Filt Rate >60 >60 ml/min/1.73m2    Calcium 8.3 8.3 - 10.4 MG/DL    Total Bilirubin 0.3 0.2 - 1.1 MG/DL    ALT 38 12 - 65 U/L    AST 17 15 - 37 U/L    Alk Phosphatase 114 50 - 136 U/L    Total Protein 5.2 (L) 6.3 - 8.2 g/dL    Albumin 2.7 (L) 3.5 - 5.0 g/dL    Globulin 2.5 (L) 2.8 - 4.5 g/dL    Albumin/Globulin Ratio 1.1 0.4 - 1.6     CBC with Auto Differential    Collection Time: 12/24/22  6:02 AM   Result Value Ref Range    WBC 3.8 (L) 4.3 - 11.1 K/uL    RBC 3.67 (L) 4.05 - 5.2 M/uL    Hemoglobin 10.5 (L) 11.7 - 15.4 g/dL    Hematocrit 33.6 (L) 35.8 - 46.3 %    MCV 91.6 82 - 102 FL    MCH 28.6 26.1 - 32.9 PG    MCHC 31.3 (L) 31.4 - 35.0 g/dL    RDW 14.6 11.9 - 14.6 %    Platelets 035 (L) 549 - 450 K/uL    MPV 9.3 (L) 9.4 - 12.3 FL    nRBC 0.00 0.0 - 0.2 K/uL    Differential Type AUTOMATED      Seg Neutrophils 42 (L) 43 - 78 %    Lymphocytes 44 13 - 44 %    Monocytes 11 4.0 - 12.0 %    Eosinophils % 2 0.5 - 7.8 %    Basophils 1 0.0 - 2.0 %    Immature Granulocytes 0 0.0 - 5.0 %    Segs Absolute 1.6 (L) 1.7 - 8.2 K/UL    Absolute Lymph # 1.7 0.5 - 4.6 K/UL    Absolute Mono # 0.4 0.1 - 1.3 K/UL    Absolute Eos # 0.1 0.0 - 0.8 K/UL    Basophils Absolute 0.0 0.0 - 0.2 K/UL    Absolute Immature Granulocyte 0.0 0.0 - 0.5 K/UL   Vancomycin Level, Random    Collection Time: 12/24/22  6:02 AM   Result Value Ref Range    Vancomycin Rm 16.6 UG/ML   CBC with Auto Differential    Collection Time: 12/25/22  7:10 AM   Result Value Ref Range    WBC 3.7 (L) 4.3 - 11.1 K/uL    RBC 3.98 (L) 4.05 - 5.2 M/uL    Hemoglobin 11.5 (L) 11.7 - 15.4 g/dL    Hematocrit 36.8 35.8 - 46.3 %    MCV 92.5 82 - 102 FL    MCH 28.9 26.1 - 32.9 PG    MCHC 31.3 (L) 31.4 - 35.0 g/dL    RDW 14.3 11.9 - 14.6 %    Platelets 665 570 - 088 K/uL    MPV 9.5 9.4 - 12.3 FL    nRBC 0.00 0.0 - 0.2 K/uL    Differential Type AUTOMATED      Seg Neutrophils 45 43 - 78 %    Lymphocytes 38 13 - 44 %    Monocytes 13 (H) 4.0 - 12.0 %    Eosinophils % 3 0.5 - 7.8 %    Basophils 1 0.0 - 2.0 %    Immature Granulocytes 0 0.0 - 5.0 %    Segs Absolute 1.7 1.7 - 8.2 K/UL    Absolute Lymph # 1.4 0.5 - 4.6 K/UL    Absolute Mono # 0.5 0.1 - 1.3 K/UL    Absolute Eos # 0.1 0.0 - 0.8 K/UL    Basophils Absolute 0.0 0.0 - 0.2 K/UL    Absolute Immature Granulocyte 0.0 0.0 - 0.5 K/UL   Basic Metabolic Panel w/ Reflex to MG    Collection Time: 12/25/22  7:10 AM   Result Value Ref Range    Sodium 141 133 - 143 mmol/L    Potassium 4.0 3.5 - 5.1 mmol/L    Chloride 108 101 - 110 mmol/L    CO2 30 21 - 32 mmol/L    Anion Gap 3 2 - 11 mmol/L    Glucose 98 65 - 100 mg/dL    BUN 12 6 - 23 MG/DL    Creatinine 1.00 0.6 - 1.0 MG/DL    Est, Glom Filt Rate >60 >60 ml/min/1.73m2    Calcium 8.1 (L) 8.3 - 10.4 MG/DL       Objective: Vital Signs are Stable, No Acute Distress, Alert and Oriented  Dressing is Dry,  Neurovascular exam is normal. No erythema or redness at the wrist or forearm. Assessment:  Patient Active Problem List   Diagnosis    Wound infection       Plan:   Plan to switch to oral Keflex.   OK to D/C from ortho perspective with OP FU    Signed By: DENICE Baldwin

## 2022-12-26 NOTE — CARE COORDINATION
Patient with discharge orders today. Roundtrip to  provide transportation home. Patient has met all treatment goals and milestones. No supportive needs made known. CM following until discharged. ASSESSMENT NOTE    Attending Physician: Monet Poole MD  Admit Problem: Wound infection [T14. 8XXA, L08.9]  Finger infection [L08.9]  Date/Time of Admission: 12/22/2022  5:30 PM  Problem List:  Patient Active Problem List   Diagnosis    Wound infection       Service Assessment  Patient Orientation Alert and Oriented   Cognition Alert   History Provided By Patient   Primary Caregiver Self   Accompanied By/Relationship     Support Systems Parent   Patient's Healthcare Decision Maker is: Legal Next of Chalino 71 (parent:  Ava Person   [phone])   PCP Verified by CM Yes (Dr Juanjo Hollis [phone])   Last Visit to PCP Within last year   Prior Functional Level Independent in ADLs/IADLs   Current Functional Level Independent in ADLs/IADLs   Can patient return to prior living arrangement Yes   Ability to make needs known: Good   Family able to assist with home care needs: Yes   Would you like for me to discuss the discharge plan with any other family members/significant others, and if so, who?  No   Financial Resources [facility], Medicare   Community Resources     CM/ Referral       Social/Functional History  Lives With Alone   Type of [address] - Number of Steps     Entrance Stairs - Rails     Bathroom Shower/Tub     Bathroom Toilet     Bathroom Equipment     Bathroom Accessibility     Home Equipment     Receives Help From Friend(s)   ADL Assistance Independent   Bath     Dressing     Grooming     Feeding     Toileting     Homemaking Assistance     Meal Prep     Laundry     Vacuuming     Cleaning     Gardening     Yard Work     Driving     Shopping          Other ([place])     Homemaking Responsibilities     Meal Prep Responsibility     Laundry Responsibility     Cleaning Responsibility     Bill Paying/Finance Responsibility     Shopping Responsibility     Dependent Care Responsibility     Health Care Management     Other (Comment)     Ambulation Assistance     Transfer Assistance     Active      Patient's  Info     Mode of Transportation     Education     Occupation On disability   Type of Occupation       Discharge Planning   Type of [facility] Parent   Current Services Prior To Admission None   Potential Assistance Needed N/A   DME     DME     DME Ordered? No   Potential Assistance Purchasing Medications No   Meds-to-Beds: Does the patient want to have any new prescriptions delivered to bedside prior to discharge? Type of Home Care Services None   Patient expects to be discharged to: Other (comment)   Follow Up Appointment: Best Day/Time     One/Two Story Residence:     # of Interior Steps     Height of Each Step (in)     [employer] Available     History of Falls? No     Services At/After Discharge  Transition of Care Consult (CM Consult): Discharge Planning   Internal Home Health     Internal Hospice     Reason Outside Agency [address]     Partner SNF     Reason Why Partner SNF Not Chosen     Internal Comfort Care     Reason Outside [address] Discharge Transport   [employer] Resource Information Provided? No   Mode of Transport at Discharge [address] [facility] Time of Discharge     Confirm Follow Up Transport Self     Condition of Participation: Discharge Planning  The plan for Transition of Care is related to the following treatment goals: return to baseline   The Patient and/or Patient Representative was provided with a Choice of Provider? Patient   Name of the Patient Representative who was provided with the Choice of Provider and agrees with the Discharge Plan?      The Patient and/or Patient Representative Agree with the Discharge Plan? Yes   Freedom of Choice list was provided with basic dialogue that supports the individualized plan of care/goals, treatment preferences, and shares the quality data associated with the providers?  Yes     Documentation for Discharge Appeal  Discharge Appealed by     Date notified by QIO of appeal request:     Time notified by QIO of appeal request:     Detailed Notice of Discharge given to:     Date Notice of Discharge given:     Time Notice of Discharge given:     [facility]     Time records sent to [facility]     Date Notified of Outcome     Time Notified of Outcome     Outcome of appeal           Teofilo Fitch RN 12/26/22 3:02 PM

## 2022-12-26 NOTE — PLAN OF CARE
Problem: Discharge Planning  Goal: Discharge to home or other facility with appropriate resources  Outcome: Progressing     Problem: ABCDS Injury Assessment  Goal: Absence of physical injury  Outcome: Progressing     Problem: Pain  Goal: Verbalizes/displays adequate comfort level or baseline comfort level  Outcome: Progressing     Problem: Safety - Adult  Goal: Free from fall injury  Outcome: Progressing  Flowsheets (Taken 12/25/2022 2818 by Catherine Hawkins.  Milena Young RN)  Free From Fall Injury: Instruct family/caregiver on patient safety

## 2022-12-26 NOTE — PROGRESS NOTES
END OF SHIFT NOTE:    INTAKE/OUTPUT  12/24 0701 - 12/25 0700  In: 3359.6 [P.O.:1401; I.V.:1708.6]  Out: 1000 [Urine:1000]  Voiding: Yes  Catheter: No  Drain:              Flatus: Patient does have flatus present. Stool:  0 occurrences. Characteristics:  Stool Appearance: Unable to assess  Stool Color: Unable to assess  Stool Amount: Unable to assess  Stool Assessment  Stool Appearance: Unable to assess  Stool Color: Unable to assess  Stool Amount: Unable to assess  Last BM (including prior to admit): 12/22/22    Emesis:  occurrences. Characteristics:        VITAL SIGNS  Patient Vitals for the past 12 hrs:   Temp Pulse Resp BP SpO2   12/25/22 1510 97.3 °F (36.3 °C) 84 18 (!) 101/58 100 %   12/25/22 1125 -- 82 -- 92/67 97 %   12/25/22 1123 98.6 °F (37 °C) 78 20 (!) 94/50 98 %   12/25/22 0748 -- 85 -- (!) 95/57 98 %   12/25/22 0747 98.2 °F (36.8 °C) 88 18 (!) 99/52 98 %       Pain Assessment  Pain Level: 5 (12/25/22 1749)  Pain Location: Finger (Comment which one)  Patient's Stated Pain Goal: 0 - No pain    Ambulating  Yes    Shift report given to oncoming nurse at the bedside. [facility]
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
## 2022-12-26 NOTE — PROGRESS NOTES
Discharge instructions reviewed with patient. Prescription sent to patient's pharmacy. R index finger redressed and instructed pt to do daily dressing changes and to follow up with surgeon. Dressing supplies provided. Verbalized understanding. CW to arrange transportation to desire address.

## 2022-12-26 NOTE — PROGRESS NOTES
This RN was passing meds to pt when multiple medicine cups with pills were noticed on pt side table. Educated pt on the importance of taking her meds. This RN then handed pt her prazosin 2mg (2 - 1mg capsules). Pt took the 1st capsule and then refused to take the 2nd.  Stated \"This is to many meds in my stomach, I need IV\"

## 2023-01-10 NOTE — PROGRESS NOTES
Physician Progress Note      PATIENT:               Ky Vu  CSN #:                  [id number]  :                       1980  ADMIT DATE:       2022 5:30 PM  Merline Shine DATE:        2022 4:15 PM  RESPONDING  PROVIDER #:        Travis Cheng MD          QUERY TEXT:    Patient was admitted with a wound infection of the finger. It is unclear what   caused the infection/wound. Progress notes mention that patient has undergone   a prior debridement due to an infection of the finger. Progress notes also   stated, \"Presenting Complaint: Post-op Problem. Initial Admission Diagnosis:   Wound infection [T14. 8XXA, L08.9] Finger infection. After study, can the   infection be further specified as: The medical record reflects the following:  Risk Factors: PTSD, polysubstance abuse, I&D x2 by ortho, non-compliance with   antibiotics post procedure  Clinical Indicators: post I&D x2, failed to show up for follow up, ortho   pleased with how wound looks. no washout needed. iv abx. -- pt pain medication   seeking. R index finger pain. notes--Right index finger infection/MRSA  Treatment: IV Vanc, labs, cultures, fluids, keflex and doxy po on discharge,   essential home meds. Options provided:  -- Skin infection of the finger, if able to determine please specify suspected   cause  -- Postoperative wound/skin infection of the finger, please clarify the   surgery responsible for the infection  -- Other - I will add my own diagnosis  -- Disagree - Not applicable / Not valid  -- Disagree - Clinically unable to determine / Unknown  -- Refer to Clinical Documentation Reviewer    PROVIDER RESPONSE TEXT:    This patient has a postoperative wound/skin infection of the finger, please   clarify the surgery responsible for the infection.     Query created by: Roxanne Wu on 1/10/2023 1:08 PM      Electronically signed by:  Travis Cheng MD 1/10/2023 5:04 PM

## 2023-02-14 ENCOUNTER — TELEPHONE (OUTPATIENT)
Dept: ORTHOPEDIC SURGERY | Age: 43
End: 2023-02-14

## 2023-02-14 ENCOUNTER — OFFICE VISIT (OUTPATIENT)
Dept: ORTHOPEDIC SURGERY | Age: 43
End: 2023-02-14

## 2023-02-14 DIAGNOSIS — L02.511 ABSCESS OF RIGHT INDEX FINGER: Primary | ICD-10-CM

## 2023-02-14 PROCEDURE — 99024 POSTOP FOLLOW-UP VISIT: CPT | Performed by: ORTHOPAEDIC SURGERY

## 2023-02-14 NOTE — TELEPHONE ENCOUNTER
This  facility  [facility]  fax [phone]  called  requesting  office  notes  from  todays  visit  on this  patient

## 2023-02-14 NOTE — PROGRESS NOTES
Progress Note    Patient: Chase Stevens MRN: [id number]  SSN: [id number]    YOB: 1980  Age: 41 y.o. Sex: female        2/14/2023      Subjective:     Patient is about 10 weeks out from an infection to her right index finger. This was something that have been going on for several days and I was concerned enough about the appearance of her wound that I actually washed it out and took her back 48 hours and washed out a second time. She did have a pretty significant amount of involvement of even the flexor tendons and it did appear that her ulnar and radial digital nerves in her index finger were intact. She is here today just saying she has a lot of spasm in the other fingers of her right hand. She also is concerned about the stiffness and contracture she has in her right index finger    Objective: There were no vitals filed for this visit. Physical Exam:     Skin - incision is well healed with no redness or drainage for the most part and she only has 1 very small area on the volar aspect of her finger that is still granulating and this is a very minimal amount so her incision has for the most part healed. Motor and sensory function intact in RIGHT UPPER extremity  Pulses palpable in RIGHT UPPER extremity   She does have a pretty significant amount of stiffness in her right index finger at the DIP and PIP joints especially to a lesser extent at the MCP joint. She also appears to have a little bit of sagittal plane deformity through the PIP joint as well although it is hard to tell if this is just because there is patient contracture of this and I think this is accentuated.   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  I think it is not completely unreasonable to see if one of the hand surgeons either Dr. Salgado or Dr. Chacon could see her just offer an opinion if there is anything else that they think might help with this contracture.  I am not sure that they will have a whole lot to offer her but she would really like to at least speak with them and see if they have anything else that might help with the stiffness she has in her right index finger.  If they would like for me to I will be happy to see her again but otherwise she can probably follow-up as needed with me    Signed By: Osvaldo Jauregui MD     February 14, 2023

## 2023-05-31 ENCOUNTER — HOSPITAL ENCOUNTER (EMERGENCY)
Age: 43
Discharge: LEFT BEFORE DISCHARGE | End: 2023-05-31
Attending: EMERGENCY MEDICINE

## 2023-05-31 VITALS
OXYGEN SATURATION: 100 % | BODY MASS INDEX: 21.26 KG/M2 | WEIGHT: 120 LBS | HEART RATE: 99 BPM | HEIGHT: 63 IN | SYSTOLIC BLOOD PRESSURE: 105 MMHG | RESPIRATION RATE: 18 BRPM | DIASTOLIC BLOOD PRESSURE: 81 MMHG | TEMPERATURE: 98.2 F

## 2023-05-31 ASSESSMENT — PAIN SCALES - GENERAL: PAINLEVEL_OUTOF10: 7

## 2023-05-31 ASSESSMENT — PAIN DESCRIPTION - LOCATION: LOCATION: FACE

## 2023-05-31 ASSESSMENT — PAIN - FUNCTIONAL ASSESSMENT: PAIN_FUNCTIONAL_ASSESSMENT: 0-10

## 2023-05-31 ASSESSMENT — PAIN DESCRIPTION - ORIENTATION: ORIENTATION: RIGHT

## 2023-06-01 NOTE — ED TRIAGE NOTES
Pt presents with abscess to right ear that began about a week, states she has been treating it at home with abx ointment but now it appears enlarged, +yellow drainage.

## 2023-07-10 ENCOUNTER — HOSPITAL ENCOUNTER (EMERGENCY)
Age: 43
Discharge: HOME | End: 2023-07-11
Attending: STUDENT IN AN ORGANIZED HEALTH CARE EDUCATION/TRAINING PROGRAM
Payer: MEDICARE

## 2023-07-10 VITALS
BODY MASS INDEX: 19.62 KG/M2 | SYSTOLIC BLOOD PRESSURE: 117 MMHG | DIASTOLIC BLOOD PRESSURE: 79 MMHG | WEIGHT: 125 LBS | HEIGHT: 67 IN | OXYGEN SATURATION: 100 % | HEART RATE: 86 BPM | RESPIRATION RATE: 17 BRPM | TEMPERATURE: 97.6 F

## 2023-07-10 DIAGNOSIS — R21 RASH AND OTHER NONSPECIFIC SKIN ERUPTION: Primary | ICD-10-CM

## 2023-07-10 PROCEDURE — 99283 EMERGENCY DEPT VISIT LOW MDM: CPT

## 2023-07-10 ASSESSMENT — LIFESTYLE VARIABLES
HOW OFTEN DO YOU HAVE A DRINK CONTAINING ALCOHOL: NEVER
HOW MANY STANDARD DRINKS CONTAINING ALCOHOL DO YOU HAVE ON A TYPICAL DAY: PATIENT DOES NOT DRINK

## 2023-07-10 ASSESSMENT — PAIN DESCRIPTION - LOCATION: LOCATION: FACE

## 2023-07-10 ASSESSMENT — PAIN - FUNCTIONAL ASSESSMENT: PAIN_FUNCTIONAL_ASSESSMENT: 0-10

## 2023-07-10 ASSESSMENT — PAIN DESCRIPTION - DESCRIPTORS: DESCRIPTORS: BURNING

## 2023-07-10 ASSESSMENT — PAIN SCALES - GENERAL: PAINLEVEL_OUTOF10: 8
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
## 2023-07-11 NOTE — DISCHARGE INSTRUCTIONS
You may use the ointment as directed. Return to the ER if any new or worsenign symptoms or if you develop fever. You may follow up with the Family Medicine clinic to establish care. Give their office a call in the morning to set up an appointment.     [address], [address] [place] [address]  [phone]
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
## 2023-08-23 ENCOUNTER — HOSPITAL ENCOUNTER (EMERGENCY)
Age: 43
Discharge: HOME | End: 2023-08-23
Attending: EMERGENCY MEDICINE
Payer: MEDICARE

## 2023-08-23 VITALS
HEART RATE: 85 BPM | OXYGEN SATURATION: 97 % | BODY MASS INDEX: 21.03 KG/M2 | SYSTOLIC BLOOD PRESSURE: 130 MMHG | WEIGHT: 134 LBS | RESPIRATION RATE: 15 BRPM | HEIGHT: 67 IN | DIASTOLIC BLOOD PRESSURE: 87 MMHG

## 2023-08-23 DIAGNOSIS — R21 EXCORIATED RASH: Primary | ICD-10-CM

## 2023-08-23 DIAGNOSIS — F43.12 CHRONIC POST-TRAUMATIC STRESS DISORDER (PTSD): ICD-10-CM

## 2023-08-23 DIAGNOSIS — F19.10 POLYSUBSTANCE ABUSE (HCC): ICD-10-CM

## 2023-08-23 PROCEDURE — 99283 EMERGENCY DEPT VISIT LOW MDM: CPT

## 2023-08-23 RX ORDER — ONDANSETRON 4 MG/1
4 TABLET, ORALLY DISINTEGRATING ORAL 3 TIMES DAILY PRN
Qty: 21 TABLET | Refills: 0 | Status: SHIPPED | OUTPATIENT
Start: 2023-08-23

## 2023-08-23 RX ORDER — DOXYCYCLINE HYCLATE 100 MG
100 TABLET ORAL 2 TIMES DAILY
Qty: 10 TABLET | Refills: 0 | Status: SHIPPED | OUTPATIENT
Start: 2023-08-23 | End: 2023-08-28

## 2023-08-23 NOTE — ED TRIAGE NOTES
Pt arrives via pov ambulatory c/o lower back pain that is chronic and reports \"broke out skin\". Also requesting a referral for psychiatrist. Reason because \"I just do\".

## 2023-08-23 NOTE — ED PROVIDER NOTES
Emergency Department Provider Note       PCP: Jim Pavon MD   Age: 41 y.o. Sex: female     DISPOSITION Decision To Discharge 08/23/2023 03:33:15 PM       ICD-10-CM    1. Excoriated rash  R21       2. Polysubstance abuse ([address] )  F19.10       3. Chronic post-traumatic stress disorder (PTSD)  F43.12 [address] [place], MD Nikki, Psychiatry, [facility] Decision Making     Complexity of Problems Addressed:  Complexity of Problem: 1 acute uncomplicated problem    Data Reviewed and Analyzed:  I independently ordered and reviewed each unique test.  I reviewed external records: ED visit note from an outside group. Discussion of management or test interpretation. Patient presented with complaint of excoriated lesions on bilateral lower legs have been present for a few months now. Has been prescribed Keflex and doxycycline in the past but lesions have persisted. She has a past history of polysubstance abuse. Given the appearance of the skin lesions, there are excoriated appearance and numerous count are more suspicious for drug rash. Low suspicion for cellulitis given no overlying erythema. Given outpatient referral to psychiatry. Low back pain likely secondary to sleeping on the floor as there is absence of red flag signs or symptoms of bowel bladder incontinence, saddle anesthesias, or abdominal pain. Patient is ambulatory without difficulty. Risk of Complications and/or Morbidity of Patient Management:      History     Mckinley Chowdhury is a 41 y.o. female who presents to the Emergency Department with chief complaint of    Chief Complaint   Patient presents with    Back Pain      41 y.o. female with a history of polysubstance abuse who presents to the ED with complaint of rash on bilateral lower legs and feet. Has not seen in recent history and prescribed mupirocin by another provider about a month ago.   States that her rash is not improving and she is insistent that she has

## 2023-08-23 NOTE — DISCHARGE INSTRUCTIONS
Take doxycycline twice daily for the next 5 days. See information below for psychiatry office and primary care provider. Follow-up with the primary care office for your health needs. Dr. Al Sears office, psychiatry   [address] [facility],[address] [place] [address] [phone]     We would love to help you get a primary care doctor for follow-up after your emergency department visit. Please call [phone] between 7AM - 6PM Monday to Friday. A care navigator will be able to assist you with setting up a doctor close to your home.

## 2023-08-24 ENCOUNTER — HOSPITAL ENCOUNTER (EMERGENCY)
Age: 43
Discharge: HOME | End: 2023-08-24
Attending: EMERGENCY MEDICINE
Payer: MEDICARE

## 2023-08-24 VITALS
RESPIRATION RATE: 18 BRPM | HEART RATE: 74 BPM | HEIGHT: 67 IN | DIASTOLIC BLOOD PRESSURE: 81 MMHG | OXYGEN SATURATION: 100 % | TEMPERATURE: 98.4 F | WEIGHT: 134 LBS | BODY MASS INDEX: 21.03 KG/M2 | SYSTOLIC BLOOD PRESSURE: 125 MMHG

## 2023-08-24 DIAGNOSIS — F31.9 BIPOLAR 1 DISORDER (HCC): Primary | ICD-10-CM

## 2023-08-24 PROCEDURE — 99283 EMERGENCY DEPT VISIT LOW MDM: CPT

## 2023-08-24 PROCEDURE — 6370000000 HC RX 637 (ALT 250 FOR IP): Performed by: PHYSICIAN ASSISTANT

## 2023-08-24 RX ORDER — BUSPIRONE HYDROCHLORIDE 15 MG/1
15 TABLET ORAL DAILY
Qty: 14 TABLET | Refills: 0 | Status: SHIPPED | OUTPATIENT
Start: 2023-08-24

## 2023-08-24 RX ORDER — BENZTROPINE MESYLATE 0.5 MG/1
0.5 TABLET ORAL 2 TIMES DAILY
Qty: 28 TABLET | Refills: 0 | Status: SHIPPED | OUTPATIENT
Start: 2023-08-24

## 2023-08-24 RX ORDER — OLANZAPINE 7.5 MG/1
7.5 TABLET ORAL NIGHTLY
Qty: 14 TABLET | Refills: 0 | Status: SHIPPED | OUTPATIENT
Start: 2023-08-24 | End: 2023-09-07

## 2023-08-24 RX ORDER — IBUPROFEN 800 MG/1
800 TABLET ORAL
Status: COMPLETED | OUTPATIENT
Start: 2023-08-24 | End: 2023-08-24

## 2023-08-24 RX ADMIN — IBUPROFEN 800 MG: 800 TABLET ORAL at 17:46

## 2023-08-24 ASSESSMENT — PAIN SCALES - GENERAL
PAINLEVEL_OUTOF10: 0
PAINLEVEL_OUTOF10: 4

## 2023-08-24 ASSESSMENT — PAIN - FUNCTIONAL ASSESSMENT
PAIN_FUNCTIONAL_ASSESSMENT: NONE - DENIES PAIN
PAIN_FUNCTIONAL_ASSESSMENT: 0-10

## 2023-08-24 NOTE — ED TRIAGE NOTES
Patient ambulatory to triage with CO out of medications awaiting appointment on 9/5. Out of cogentin, buspar, mirtazapine, olanzapine, prazosin, and quetiapine.  Denies SI/HI

## 2023-09-26 ENCOUNTER — HOSPITAL ENCOUNTER (EMERGENCY)
Age: 43
Discharge: HOME | End: 2023-09-26
Attending: EMERGENCY MEDICINE
Payer: MEDICARE

## 2023-09-26 VITALS
DIASTOLIC BLOOD PRESSURE: 89 MMHG | OXYGEN SATURATION: 100 % | TEMPERATURE: 98.4 F | SYSTOLIC BLOOD PRESSURE: 125 MMHG | RESPIRATION RATE: 18 BRPM | BODY MASS INDEX: 21.97 KG/M2 | HEIGHT: 67 IN | WEIGHT: 140 LBS | HEART RATE: 98 BPM

## 2023-09-26 DIAGNOSIS — L73.9 FOLLICULITIS: ICD-10-CM

## 2023-09-26 DIAGNOSIS — J06.9 VIRAL URI: Primary | ICD-10-CM

## 2023-09-26 LAB
FLUAV RNA SPEC QL NAA+PROBE: NOT DETECTED
FLUBV RNA SPEC QL NAA+PROBE: NOT DETECTED
SARS-COV-2 RDRP RESP QL NAA+PROBE: NOT DETECTED
SOURCE: NORMAL
STREP, MOLECULAR: NOT DETECTED

## 2023-09-26 PROCEDURE — 87502 INFLUENZA DNA AMP PROBE: CPT

## 2023-09-26 PROCEDURE — 87651 STREP A DNA AMP PROBE: CPT

## 2023-09-26 PROCEDURE — 99283 EMERGENCY DEPT VISIT LOW MDM: CPT

## 2023-09-26 PROCEDURE — 87635 SARS-COV-2 COVID-19 AMP PRB: CPT

## 2023-09-26 RX ORDER — DOXYCYCLINE HYCLATE 100 MG
100 TABLET ORAL 2 TIMES DAILY
Qty: 14 TABLET | Refills: 0 | Status: SHIPPED | OUTPATIENT
Start: 2023-09-26 | End: 2023-10-03

## 2023-09-26 RX ORDER — DOXYCYCLINE HYCLATE 100 MG
100 TABLET ORAL 2 TIMES DAILY
Qty: 14 TABLET | Refills: 0 | Status: SHIPPED | OUTPATIENT
Start: 2023-09-26 | End: 2023-09-26 | Stop reason: SDUPTHER

## 2023-09-26 ASSESSMENT — PAIN DESCRIPTION - LOCATION: LOCATION: BACK

## 2023-09-26 ASSESSMENT — PAIN - FUNCTIONAL ASSESSMENT: PAIN_FUNCTIONAL_ASSESSMENT: 0-10

## 2023-09-26 ASSESSMENT — LIFESTYLE VARIABLES
HOW MANY STANDARD DRINKS CONTAINING ALCOHOL DO YOU HAVE ON A TYPICAL DAY: PATIENT DOES NOT DRINK
HOW OFTEN DO YOU HAVE A DRINK CONTAINING ALCOHOL: NEVER

## 2023-09-26 ASSESSMENT — PAIN SCALES - GENERAL: PAINLEVEL_OUTOF10: 8

## 2023-09-26 NOTE — ED NOTES
I have reviewed discharge instructions with the patient. The patient verbalized understanding. Patient left ED via Discharge Method: ambulatory to Home with (self). Opportunity for questions and clarification provided. Patient given 1 scripts. To continue your aftercare when you leave the hospital, you may receive an automated call from our care team to check in on how you are doing. This is a free service and part of our promise to provide the best care and service to meet your aftercare needs. \" If you have questions, or wish to unsubscribe from this service please call [phone]. Thank you for Choosing our [facility] Emergency Department.         Renny Villanueva RN  09/26/23 8274

## 2023-09-26 NOTE — DISCHARGE INSTRUCTIONS
Flu, strep, and COVID were negative. We will begin you on doxycycline for your rash. Please begin taking Benadryl as well for itching. Please try not to scratch the areas. Keep it clean with normal soap and water. Please begin using a daily Flonase nasal spray and antihistamine for congestion and runny nose. Return to the ED with any new or worsening symptoms.

## 2023-09-26 NOTE — ED TRIAGE NOTES
Pt ambulatory to triage with CO scattered scabbing to lower legs and bilateral arms. Reports itching and \"skin crawling\".  Also reports nasal congestion and cough x 2 days

## 2023-10-20 ENCOUNTER — HOSPITAL ENCOUNTER (EMERGENCY)
Age: 43
Discharge: HOME | End: 2023-10-20
Attending: EMERGENCY MEDICINE
Payer: MEDICARE

## 2023-10-20 VITALS
OXYGEN SATURATION: 98 % | DIASTOLIC BLOOD PRESSURE: 91 MMHG | TEMPERATURE: 97.5 F | RESPIRATION RATE: 16 BRPM | BODY MASS INDEX: 20.4 KG/M2 | SYSTOLIC BLOOD PRESSURE: 135 MMHG | HEART RATE: 95 BPM | HEIGHT: 67 IN | WEIGHT: 130 LBS

## 2023-10-20 DIAGNOSIS — R30.0 DYSURIA: Primary | ICD-10-CM

## 2023-10-20 DIAGNOSIS — L85.3 DRY SKIN: ICD-10-CM

## 2023-10-20 LAB
BILIRUB UR QL: NEGATIVE
GLUCOSE UR QL STRIP.AUTO: NEGATIVE MG/DL
HCG UR QL: NEGATIVE
KETONES UR-MCNC: NEGATIVE MG/DL
LEUKOCYTE ESTERASE UR QL STRIP: NEGATIVE
NITRITE UR QL: NEGATIVE
PH UR: 7 (ref 5–9)
PROT UR QL: NEGATIVE MG/DL
RBC # UR STRIP: ABNORMAL
SERVICE CMNT-IMP: ABNORMAL
SERVICE CMNT-IMP: NORMAL
SP GR UR: 1.01 (ref 1–1.02)
UROBILINOGEN UR QL: 0.2 EU/DL (ref 0.2–1)
WET PREP GENITAL: NORMAL
WET PREP GENITAL: NORMAL

## 2023-10-20 PROCEDURE — 87591 N.GONORRHOEAE DNA AMP PROB: CPT

## 2023-10-20 PROCEDURE — 87491 CHLMYD TRACH DNA AMP PROBE: CPT

## 2023-10-20 PROCEDURE — 99283 EMERGENCY DEPT VISIT LOW MDM: CPT

## 2023-10-20 PROCEDURE — 87210 SMEAR WET MOUNT SALINE/INK: CPT

## 2023-10-20 PROCEDURE — 87086 URINE CULTURE/COLONY COUNT: CPT

## 2023-10-20 PROCEDURE — 81025 URINE PREGNANCY TEST: CPT

## 2023-10-20 PROCEDURE — 81003 URINALYSIS AUTO W/O SCOPE: CPT

## 2023-10-20 RX ORDER — CEPHALEXIN 500 MG/1
500 CAPSULE ORAL 2 TIMES DAILY
Qty: 14 CAPSULE | Refills: 0 | Status: SHIPPED | OUTPATIENT
Start: 2023-10-20 | End: 2023-10-27

## 2023-10-20 ASSESSMENT — PAIN DESCRIPTION - LOCATION: LOCATION: ABDOMEN

## 2023-10-20 ASSESSMENT — ENCOUNTER SYMPTOMS
COLOR CHANGE: 0
ABDOMINAL PAIN: 1
NAUSEA: 0
BACK PAIN: 1
DIARRHEA: 0
COUGH: 0
VOMITING: 0

## 2023-10-20 ASSESSMENT — PAIN - FUNCTIONAL ASSESSMENT: PAIN_FUNCTIONAL_ASSESSMENT: 0-10

## 2023-10-20 ASSESSMENT — PAIN DESCRIPTION - ORIENTATION: ORIENTATION: LOWER

## 2023-10-20 ASSESSMENT — PAIN SCALES - GENERAL: PAINLEVEL_OUTOF10: 6

## 2023-10-20 NOTE — ED TRIAGE NOTES
Patient arrives to ED pov from home. Patient reports right lower abdominal pain that started 2 days ago. Patient reports nausea. Denies vomiting or diarrhea. Patient reports foul odor to urine.

## 2023-10-20 NOTE — DISCHARGE INSTRUCTIONS
Antibiotics twice daily for 7 days for urinary symptoms. Increase fluids. Over-the-counter bacitracin ointment to any open skin wounds twice daily for 5 to 7 days. Over-the-counter Lubriderm lotion or Eucerin cream to dry skin areas twice daily. Follow-up with your doctor in 1 week if not improving. Return if any new, worsening or concerning symptoms.

## 2023-10-22 LAB
BACTERIA SPEC CULT: NORMAL
SERVICE CMNT-IMP: NORMAL

## 2023-10-25 LAB
C TRACH RRNA SPEC QL NAA+PROBE: NORMAL
N GONORRHOEA RRNA SPEC QL NAA+PROBE: NORMAL
SPECIMEN SOURCE: NORMAL

## 2023-12-30 ENCOUNTER — APPOINTMENT (OUTPATIENT)
Dept: GENERAL RADIOLOGY | Age: 43
End: 2023-12-30
Payer: MEDICARE

## 2023-12-30 ENCOUNTER — HOSPITAL ENCOUNTER (EMERGENCY)
Age: 43
Discharge: HOME | End: 2023-12-30
Attending: GENERAL PRACTICE
Payer: MEDICARE

## 2023-12-30 VITALS
HEIGHT: 67 IN | DIASTOLIC BLOOD PRESSURE: 81 MMHG | OXYGEN SATURATION: 98 % | HEART RATE: 101 BPM | SYSTOLIC BLOOD PRESSURE: 113 MMHG | BODY MASS INDEX: 19.62 KG/M2 | WEIGHT: 125 LBS | RESPIRATION RATE: 16 BRPM | TEMPERATURE: 97.7 F

## 2023-12-30 DIAGNOSIS — R05.1 ACUTE COUGH: Primary | ICD-10-CM

## 2023-12-30 DIAGNOSIS — R30.0 DYSURIA: ICD-10-CM

## 2023-12-30 LAB
APPEARANCE UR: NORMAL
BILIRUB UR QL: NEGATIVE
COLOR UR: NORMAL
FLUAV RNA SPEC QL NAA+PROBE: NOT DETECTED
FLUBV RNA SPEC QL NAA+PROBE: NOT DETECTED
GLUCOSE UR STRIP.AUTO-MCNC: NEGATIVE MG/DL
HGB UR QL STRIP: NEGATIVE
KETONES UR QL STRIP.AUTO: NEGATIVE MG/DL
LEUKOCYTE ESTERASE UR QL STRIP.AUTO: NEGATIVE
NITRITE UR QL STRIP.AUTO: NEGATIVE
PH UR STRIP: 6.5 (ref 5–9)
PROT UR STRIP-MCNC: NEGATIVE MG/DL
SARS-COV-2 RDRP RESP QL NAA+PROBE: NOT DETECTED
SOURCE: NORMAL
SP GR UR REFRACTOMETRY: 1.01 (ref 1–1.02)
UROBILINOGEN UR QL STRIP.AUTO: 0.2 EU/DL (ref 0.2–1)

## 2023-12-30 PROCEDURE — 87502 INFLUENZA DNA AMP PROBE: CPT

## 2023-12-30 PROCEDURE — 71045 X-RAY EXAM CHEST 1 VIEW: CPT

## 2023-12-30 PROCEDURE — 81003 URINALYSIS AUTO W/O SCOPE: CPT

## 2023-12-30 PROCEDURE — 87635 SARS-COV-2 COVID-19 AMP PRB: CPT

## 2023-12-30 PROCEDURE — 99284 EMERGENCY DEPT VISIT MOD MDM: CPT
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
## 2024-01-05 ENCOUNTER — HOSPITAL ENCOUNTER (EMERGENCY)
Age: 44
Discharge: HOME | End: 2024-01-05
Payer: MEDICARE

## 2024-01-05 VITALS
SYSTOLIC BLOOD PRESSURE: 116 MMHG | HEART RATE: 84 BPM | OXYGEN SATURATION: 98 % | HEIGHT: 67 IN | TEMPERATURE: 98.1 F | DIASTOLIC BLOOD PRESSURE: 77 MMHG | BODY MASS INDEX: 19.62 KG/M2 | WEIGHT: 125 LBS | RESPIRATION RATE: 20 BRPM

## 2024-01-05 DIAGNOSIS — B37.9 YEAST INFECTION: Primary | ICD-10-CM

## 2024-01-05 LAB
BILIRUB UR QL: NEGATIVE
CHP ED QC CHECK: YES
GLUCOSE UR QL STRIP.AUTO: NEGATIVE MG/DL
HCG UR QL: NEGATIVE
KETONES UR-MCNC: 15 MG/DL
LEUKOCYTE ESTERASE UR QL STRIP: NEGATIVE
NITRITE UR QL: NEGATIVE
PH UR: 6 (ref 5–9)
PROT UR QL: NEGATIVE MG/DL
RBC # UR STRIP: ABNORMAL
SERVICE CMNT-IMP: ABNORMAL
SERVICE CMNT-IMP: NORMAL
SP GR UR: 1.02 (ref 1–1.02)
UROBILINOGEN UR QL: 0.2 EU/DL (ref 0.2–1)
WET PREP GENITAL: NORMAL

## 2024-01-05 PROCEDURE — 96372 THER/PROPH/DIAG INJ SC/IM: CPT

## 2024-01-05 PROCEDURE — 2500000003 HC RX 250 WO HCPCS: Performed by: NURSE PRACTITIONER

## 2024-01-05 PROCEDURE — 87591 N.GONORRHOEAE DNA AMP PROB: CPT

## 2024-01-05 PROCEDURE — 6370000000 HC RX 637 (ALT 250 FOR IP): Performed by: NURSE PRACTITIONER

## 2024-01-05 PROCEDURE — 87210 SMEAR WET MOUNT SALINE/INK: CPT

## 2024-01-05 PROCEDURE — 81003 URINALYSIS AUTO W/O SCOPE: CPT

## 2024-01-05 PROCEDURE — 81025 URINE PREGNANCY TEST: CPT

## 2024-01-05 PROCEDURE — 99284 EMERGENCY DEPT VISIT MOD MDM: CPT

## 2024-01-05 PROCEDURE — 87491 CHLMYD TRACH DNA AMP PROBE: CPT

## 2024-01-05 PROCEDURE — 6360000002 HC RX W HCPCS: Performed by: NURSE PRACTITIONER

## 2024-01-05 RX ORDER — ONDANSETRON 4 MG/1
4 TABLET, ORALLY DISINTEGRATING ORAL
Status: COMPLETED | OUTPATIENT
Start: 2024-01-05 | End: 2024-01-05

## 2024-01-05 RX ORDER — HYDROXYZINE HYDROCHLORIDE 25 MG/1
25 TABLET, FILM COATED ORAL
Status: COMPLETED | OUTPATIENT
Start: 2024-01-05 | End: 2024-01-05

## 2024-01-05 RX ORDER — DOXYCYCLINE HYCLATE 100 MG
100 TABLET ORAL 2 TIMES DAILY
Qty: 14 TABLET | Refills: 0 | Status: SHIPPED | OUTPATIENT
Start: 2024-01-05 | End: 2024-01-12

## 2024-01-05 RX ORDER — ONDANSETRON 4 MG/1
4 TABLET, FILM COATED ORAL EVERY 8 HOURS PRN
Qty: 9 TABLET | Refills: 0 | Status: SHIPPED | OUTPATIENT
Start: 2024-01-05 | End: 2024-01-08

## 2024-01-05 RX ORDER — FLUCONAZOLE 150 MG/1
150 TABLET ORAL ONCE
Qty: 1 TABLET | Refills: 1 | Status: SHIPPED | OUTPATIENT
Start: 2024-01-05 | End: 2024-01-05

## 2024-01-05 RX ADMIN — HYDROXYZINE HYDROCHLORIDE 25 MG: 25 TABLET, FILM COATED ORAL at 15:48

## 2024-01-05 RX ADMIN — ONDANSETRON 4 MG: 4 TABLET, ORALLY DISINTEGRATING ORAL at 15:48

## 2024-01-05 RX ADMIN — LIDOCAINE HYDROCHLORIDE 500 MG: 10 INJECTION, SOLUTION INFILTRATION; PERINEURAL at 15:49

## 2024-01-05 ASSESSMENT — PAIN SCALES - GENERAL
PAINLEVEL_OUTOF10: 3
PAINLEVEL_OUTOF10: 3

## 2024-01-05 NOTE — ED NOTES
I have reviewed discharge instructions with the patient.  The patient verbalized understanding.    Patient left ED via Discharge Method: ambulatory to Home with self.    Opportunity for questions and clarification provided.       Patient given 3 scripts.         To continue your aftercare when you leave the hospital, you may receive an automated call from our care team to check in on how you are doing.  This is a free service and part of our promise to provide the best care and service to meet your aftercare needs.” If you have questions, or wish to unsubscribe from this service please call [phone].  Thank you for Choosing our [facility] Emergency Department.        Faye Zarco, RN  01/05/24 3905

## 2024-01-05 NOTE — ED TRIAGE NOTES
Per patient pelvic pressure with pressure in vagina x2 days. States yellow discharge with foul odor. States burning upon urination. Denies n/v/d. Denies gu complications.

## 2024-01-05 NOTE — DISCHARGE INSTRUCTIONS
Take medications as prescribed.  Follow-up with recommended provider in the next 1-2 days.  Return to the ED immediately for any new, worsening, concerning symptoms; or for danger signs as discussed.  Refrain from all sexual activity until course of treatment is completed, further testing and treatment has been completed.  If any positive results return, advised sexual partners read to be tested and treated

## 2024-01-06 NOTE — ED PROVIDER NOTES
1/5/24 1548)       Discharge Medication List as of 1/5/2024  5:24 PM        START taking these medications    Details   doxycycline hyclate (VIBRA-TABS) 100 MG tablet Take 1 tablet by mouth 2 times daily for 7 days, Disp-14 tablet, R-0Print      fluconazole (DIFLUCAN) 150 MG tablet Take 1 tablet by mouth once for 1 dose Repeat dose in 72 hours  if symptoms persist, Disp-1 tablet, R-1Print      ondansetron (ZOFRAN) 4 MG tablet Take 1 tablet by mouth every 8 hours as needed for Nausea or Vomiting, Disp-9 tablet, R-0Print              Past Medical History:   Diagnosis Date    Alcohol withdrawal syndrome with complication (HCC) 10/11/2017    Anxiety and depression     anxiety, depression    Back pain     problems with L4-5 vertebra    Common bile duct dilatation 6/12/2013    Depression     Gall bladder disease 6/12/2013    Psychiatric disorder     Vaginal candida 11/6/2017        Past Surgical History:   Procedure Laterality Date    CHOLECYSTECTOMY      HAND SURGERY Right 11/21/2022    RIGHT INDEX FINGER DEBRIDEMENT INCISION AND DRAINAGE performed by Osvaldo Jauregui MD at [facility] MAIN OR    HAND SURGERY Right 11/23/2022    Right idnex finger irrigation and debridment performed by Osvaldo Jauregui MD at [facility] MAIN OR    ORTHOPEDIC SURGERY  2007    left foot, tonail excision        Social History     Socioeconomic History    Marital status: Single   Tobacco Use    Smoking status: Never     Passive exposure: Never    Smokeless tobacco: Never   Vaping Use    Vaping Use: Some days   Substance and Sexual Activity    Alcohol use: Not Currently    Drug use: Not Currently     Types: Prescription   Social History Narrative    ** Merged History Encounter **             Discharge Medication List as of 1/5/2024  5:24 PM        CONTINUE these medications which have NOT CHANGED    Details   benztropine (COGENTIN) 0.5 MG tablet Take 1 tablet by mouth 2 times daily, Disp-28 tablet, R-0Print      busPIRone (BUSPAR) 15 MG tablet Take 15 mg by

## 2024-01-10 ENCOUNTER — HOSPITAL ENCOUNTER (EMERGENCY)
Age: 44
Discharge: HOME | End: 2024-01-10
Payer: MEDICARE

## 2024-01-10 VITALS
TEMPERATURE: 97.5 F | DIASTOLIC BLOOD PRESSURE: 81 MMHG | WEIGHT: 125 LBS | OXYGEN SATURATION: 99 % | RESPIRATION RATE: 18 BRPM | HEIGHT: 67 IN | SYSTOLIC BLOOD PRESSURE: 125 MMHG | HEART RATE: 70 BPM | BODY MASS INDEX: 19.62 KG/M2

## 2024-01-10 DIAGNOSIS — R21 RASH AND OTHER NONSPECIFIC SKIN ERUPTION: Primary | ICD-10-CM

## 2024-01-10 LAB
AMPHET UR QL SCN: POSITIVE
BENZODIAZ UR QL: NEGATIVE
BILIRUB UR QL: NEGATIVE
CANNABINOIDS UR QL SCN: NEGATIVE
COCAINE UR QL SCN: NEGATIVE
GLUCOSE UR QL STRIP.AUTO: NEGATIVE MG/DL
KETONES UR-MCNC: NEGATIVE MG/DL
LEUKOCYTE ESTERASE UR QL STRIP: NEGATIVE
NITRITE UR QL: NEGATIVE
OPIATES UR QL: POSITIVE
PH UR: 7 (ref 5–9)
PROT UR QL: NEGATIVE MG/DL
RBC # UR STRIP: ABNORMAL
SERVICE CMNT-IMP: ABNORMAL
SP GR UR: 1.01 (ref 1–1.02)
UROBILINOGEN UR QL: 0.2 EU/DL (ref 0.2–1)

## 2024-01-10 PROCEDURE — 99283 EMERGENCY DEPT VISIT LOW MDM: CPT

## 2024-01-10 PROCEDURE — 80307 DRUG TEST PRSMV CHEM ANLYZR: CPT

## 2024-01-10 PROCEDURE — 81003 URINALYSIS AUTO W/O SCOPE: CPT

## 2024-01-10 PROCEDURE — 6370000000 HC RX 637 (ALT 250 FOR IP): Performed by: PHYSICIAN ASSISTANT

## 2024-01-10 RX ORDER — DIPHENHYDRAMINE HCL 25 MG
25 CAPSULE ORAL
Status: COMPLETED | OUTPATIENT
Start: 2024-01-10 | End: 2024-01-10

## 2024-01-10 RX ADMIN — DIPHENHYDRAMINE HYDROCHLORIDE 25 MG: 25 CAPSULE ORAL at 21:29

## 2024-01-10 ASSESSMENT — LIFESTYLE VARIABLES
HOW MANY STANDARD DRINKS CONTAINING ALCOHOL DO YOU HAVE ON A TYPICAL DAY: PATIENT DECLINED
HOW OFTEN DO YOU HAVE A DRINK CONTAINING ALCOHOL: PATIENT DECLINED

## 2024-01-11 ENCOUNTER — HOSPITAL ENCOUNTER (EMERGENCY)
Age: 44
Discharge: LEFT BEFORE DISCHARGE | End: 2024-01-11
Attending: STUDENT IN AN ORGANIZED HEALTH CARE EDUCATION/TRAINING PROGRAM
Payer: MEDICARE

## 2024-01-11 VITALS
HEIGHT: 67 IN | BODY MASS INDEX: 18.21 KG/M2 | TEMPERATURE: 97.3 F | SYSTOLIC BLOOD PRESSURE: 119 MMHG | OXYGEN SATURATION: 98 % | WEIGHT: 116 LBS | RESPIRATION RATE: 14 BRPM | DIASTOLIC BLOOD PRESSURE: 72 MMHG | HEART RATE: 99 BPM

## 2024-01-11 DIAGNOSIS — J06.9 ACUTE UPPER RESPIRATORY INFECTION: Primary | ICD-10-CM

## 2024-01-11 LAB
FLUAV RNA SPEC QL NAA+PROBE: NOT DETECTED
FLUBV RNA SPEC QL NAA+PROBE: NOT DETECTED
SARS-COV-2 RDRP RESP QL NAA+PROBE: NOT DETECTED
SOURCE: NORMAL

## 2024-01-11 PROCEDURE — 87635 SARS-COV-2 COVID-19 AMP PRB: CPT

## 2024-01-11 PROCEDURE — 87502 INFLUENZA DNA AMP PROBE: CPT

## 2024-01-11 PROCEDURE — 99283 EMERGENCY DEPT VISIT LOW MDM: CPT

## 2024-01-11 ASSESSMENT — PAIN SCALES - GENERAL: PAINLEVEL_OUTOF10: 8

## 2024-01-11 ASSESSMENT — LIFESTYLE VARIABLES
HOW OFTEN DO YOU HAVE A DRINK CONTAINING ALCOHOL: MONTHLY OR LESS
HOW MANY STANDARD DRINKS CONTAINING ALCOHOL DO YOU HAVE ON A TYPICAL DAY: 1 OR 2

## 2024-01-11 ASSESSMENT — PAIN - FUNCTIONAL ASSESSMENT: PAIN_FUNCTIONAL_ASSESSMENT: 0-10

## 2024-01-11 NOTE — ED NOTES
I have reviewed discharge instructions with the patient.  The patient verbalized understanding.    Patient left ED via Discharge Method: ambulatory to Home with self.    Opportunity for questions and clarification provided.       Patient given 0 scripts.         To continue your aftercare when you leave the hospital, you may receive an automated call from our care team to check in on how you are doing.  This is a free service and part of our promise to provide the best care and service to meet your aftercare needs.” If you have questions, or wish to unsubscribe from this service please call [phone].  Thank you for Choosing our [facility] Emergency Department.         Elvia Gaona LPN  01/10/24 7234

## 2024-01-11 NOTE — ED PROVIDER NOTES
Emergency Department Provider Note       PCP: Israel Diallo Jr., MD   Age: 43 y.o.   Sex: female     DISPOSITION Eloped - Left Before Treatment Complete 01/11/2024 08:58:51 AM       ICD-10-CM    1. Acute upper respiratory infection  J06.9           Medical Decision Making     Complexity of Problems Addressed:  Complexity of Problem: 1 acute, uncomplicated illness or injury.    Data Reviewed and Analyzed:  I independently ordered and reviewed each unique test.  I reviewed external records: ED visit note from an outside group.           Discussion of management or test interpretation.  43-year-old female presents to the Emergency department complaining of URI symptoms.  States she feels that she is getting sick.  Reports some head pressure, nasal congestion and nonproductive cough.  Patient with history of mental health disorder and has numerous complaints.  Flight of ideas.  Does not seem to be a harm to herself or others.  Will obtain COVID and flu swab as she requested.  Unfortunately patient eloped prior to results.  COVID and flu are negative.       Risk of Complications and/or Morbidity of Patient Management:      History      43-year-old female presents to the Emergency department complaining of URI symptoms.  States she feels that she is getting sick.  Reports some head pressure, nasal congestion and nonproductive cough.  Patient with history of mental health disorder and has numerous complaints.           Physical Exam     Vitals signs and nursing note reviewed.   Vitals:    01/11/24 0656 01/11/24 0658   BP: 119/72    Pulse: 99    Resp: 14    Temp: 97.3 °F (36.3 °C)    SpO2: 98%    Weight:  52.6 kg (116 lb)   Height:  1.702 m (5' 7\")       Physical Exam  Vitals and nursing note reviewed.   Constitutional:       General: She is not in acute distress.     Appearance: Normal appearance.   HENT:      Head: Normocephalic.      Nose: Nose normal.      Mouth/Throat:      Mouth: Mucous membranes are moist.    Eyes:      Extraocular Movements: Extraocular movements intact.   Cardiovascular:      Rate and Rhythm: Normal rate and regular rhythm.      Pulses: Normal pulses.      Heart sounds: Normal heart sounds.   Pulmonary:      Effort: Pulmonary effort is normal. No respiratory distress.      Breath sounds: Normal breath sounds.   Abdominal:      General: Abdomen is flat.   Musculoskeletal:         General: No swelling or tenderness. Normal range of motion.      Cervical back: Normal range of motion. No rigidity.   Skin:     General: Skin is warm.      Findings: No rash.   Neurological:      General: No focal deficit present.      Mental Status: She is alert and oriented to person, place, and time.   Psychiatric:         Mood and Affect: Mood normal.      Comments: Tangential thought process          Procedures     Procedures     Orders Placed This Encounter   Procedures   • COVID-Felicia Rapid   • Influenza A/B, Molecular        Medications given during this emergency department visit:  Medications - No data to display    New Prescriptions    No medications on file        Past Medical History:   Diagnosis Date   • Alcohol withdrawal syndrome with complication (HCC) 10/11/2017   • Anxiety and depression     anxiety, depression   • Back pain     problems with L4-5 vertebra   • Common bile duct dilatation 6/12/2013   • Depression    • Gall bladder disease 6/12/2013   • Psychiatric disorder    • Vaginal candida 11/6/2017        Past Surgical History:   Procedure Laterality Date   • CHOLECYSTECTOMY     • HAND SURGERY Right 11/21/2022    RIGHT INDEX FINGER DEBRIDEMENT INCISION AND DRAINAGE performed by Osvaldo Jauregui MD at [facility] MAIN OR   • HAND SURGERY Right 11/23/2022    Right idnex finger irrigation and debridment performed by Osvaldo Jauregui MD at [facility] MAIN OR   • ORTHOPEDIC SURGERY  2007    left foot, tonail excision        Results for orders placed or performed during the hospital encounter of 01/11/24   COVID-19, Rapid
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
## 2024-01-11 NOTE — ED TRIAGE NOTES
Pt ambulatory to ED for head pressure and cold sx. Also concerned for smell coming from skin and urine. Multiple other complaints.
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
## 2024-01-20 ENCOUNTER — HOSPITAL ENCOUNTER (EMERGENCY)
Age: 44
Discharge: HOME | End: 2024-01-21
Payer: MEDICARE

## 2024-01-20 ENCOUNTER — HOSPITAL ENCOUNTER (EMERGENCY)
Age: 44
Discharge: HOME | End: 2024-01-20
Payer: MEDICARE

## 2024-01-20 VITALS
WEIGHT: 115 LBS | RESPIRATION RATE: 17 BRPM | HEIGHT: 67 IN | BODY MASS INDEX: 18.05 KG/M2 | HEART RATE: 87 BPM | OXYGEN SATURATION: 96 % | TEMPERATURE: 98.4 F | DIASTOLIC BLOOD PRESSURE: 88 MMHG | SYSTOLIC BLOOD PRESSURE: 121 MMHG

## 2024-01-20 DIAGNOSIS — F15.10 AMPHETAMINE ABUSE (HCC): ICD-10-CM

## 2024-01-20 DIAGNOSIS — N39.0 URINARY TRACT INFECTION WITHOUT HEMATURIA, SITE UNSPECIFIED: Primary | ICD-10-CM

## 2024-01-20 DIAGNOSIS — F15.10 METHAMPHETAMINE ABUSE (HCC): Primary | ICD-10-CM

## 2024-01-20 LAB
AMPHET UR QL SCN: POSITIVE
APPEARANCE UR: ABNORMAL
BACTERIA URNS QL MICRO: ABNORMAL /HPF
BARBITURATES UR QL SCN: NEGATIVE
BENZODIAZ UR QL: NEGATIVE
BILIRUB UR QL: NEGATIVE
CANNABINOIDS UR QL SCN: NEGATIVE
COCAINE UR QL SCN: NEGATIVE
COLOR UR: ABNORMAL
EPI CELLS #/AREA URNS HPF: ABNORMAL /HPF
GLUCOSE UR STRIP.AUTO-MCNC: NEGATIVE MG/DL
HGB UR QL STRIP: ABNORMAL
KETONES UR QL STRIP.AUTO: ABNORMAL MG/DL
LEUKOCYTE ESTERASE UR QL STRIP.AUTO: NEGATIVE
METHADONE UR QL: NEGATIVE
NITRITE UR QL STRIP.AUTO: NEGATIVE
OPIATES UR QL: NEGATIVE
OTHER OBSERVATIONS: ABNORMAL
PCP UR QL: NEGATIVE
PH UR STRIP: 6 (ref 5–9)
PROT UR STRIP-MCNC: NEGATIVE MG/DL
SP GR UR REFRACTOMETRY: 1.01 (ref 1–1.02)
UROBILINOGEN UR QL STRIP.AUTO: 0.2 EU/DL (ref 0.2–1)
WBC URNS QL MICRO: ABNORMAL /HPF
YEAST URNS QL MICRO: ABNORMAL

## 2024-01-20 PROCEDURE — 81001 URINALYSIS AUTO W/SCOPE: CPT

## 2024-01-20 PROCEDURE — 6370000000 HC RX 637 (ALT 250 FOR IP): Performed by: PHYSICIAN ASSISTANT

## 2024-01-20 PROCEDURE — 80307 DRUG TEST PRSMV CHEM ANLYZR: CPT

## 2024-01-20 PROCEDURE — 99282 EMERGENCY DEPT VISIT SF MDM: CPT

## 2024-01-20 RX ORDER — SULFAMETHOXAZOLE AND TRIMETHOPRIM 800; 160 MG/1; MG/1
1 TABLET ORAL 2 TIMES DAILY
Qty: 14 TABLET | Refills: 0 | Status: SHIPPED | OUTPATIENT
Start: 2024-01-20 | End: 2024-01-27

## 2024-01-20 RX ORDER — SULFAMETHOXAZOLE AND TRIMETHOPRIM 800; 160 MG/1; MG/1
1 TABLET ORAL
Status: COMPLETED | OUTPATIENT
Start: 2024-01-20 | End: 2024-01-20

## 2024-01-20 RX ORDER — IBUPROFEN 600 MG/1
600 TABLET ORAL
Status: COMPLETED | OUTPATIENT
Start: 2024-01-20 | End: 2024-01-20

## 2024-01-20 RX ORDER — ONDANSETRON 4 MG/1
4 TABLET, ORALLY DISINTEGRATING ORAL
Status: COMPLETED | OUTPATIENT
Start: 2024-01-20 | End: 2024-01-20

## 2024-01-20 RX ADMIN — SULFAMETHOXAZOLE AND TRIMETHOPRIM 1 TABLET: 800; 160 TABLET ORAL at 04:23

## 2024-01-20 RX ADMIN — IBUPROFEN 600 MG: 600 TABLET, FILM COATED ORAL at 04:33

## 2024-01-20 RX ADMIN — ONDANSETRON 4 MG: 4 TABLET, ORALLY DISINTEGRATING ORAL at 04:33

## 2024-01-20 ASSESSMENT — PAIN SCALES - GENERAL
PAINLEVEL_OUTOF10: 7
PAINLEVEL_OUTOF10: 8
PAINLEVEL_OUTOF10: 8

## 2024-01-20 ASSESSMENT — PAIN - FUNCTIONAL ASSESSMENT
PAIN_FUNCTIONAL_ASSESSMENT: 0-10
PAIN_FUNCTIONAL_ASSESSMENT: 0-10

## 2024-01-20 ASSESSMENT — PAIN DESCRIPTION - LOCATION
LOCATION: FLANK

## 2024-01-20 ASSESSMENT — PAIN DESCRIPTION - DESCRIPTORS: DESCRIPTORS: SHARP

## 2024-01-20 ASSESSMENT — PAIN DESCRIPTION - ORIENTATION: ORIENTATION: RIGHT

## 2024-01-20 NOTE — ED PROVIDER NOTES
Emergency Department Provider Note       PCP: Israel Diallo Jr., MD   Age: 44 y.o.   Sex: female     DISPOSITION Decision To Discharge 01/20/2024 04:17:21 AM       ICD-10-CM    1. Urinary tract infection without hematuria, site unspecified  N39.0       2. Amphetamine abuse (HCC)  F15.10           Medical Decision Making     Complexity of Problems Addressed:  Complexity of Problem: 1 acute complicated illness or injury.    Data Reviewed and Analyzed:  I independently ordered and reviewed each unique test.  I reviewed external records: ED visit note from an outside group.  I reviewed external records: provider visit note from PCP.     Discussion of management or test interpretation.  Here with urinary complaints. Have been present for several days. Urine here with 2+ bacteria. Will treat with abx. Given her flight of ideas I did UDS as well. Positive for amphetamines. Will advise patient to discontinue abuse of methamphetamines.       Risk of Complications and/or Morbidity of Patient Management:  OTC drug management performed, Prescription drug management performed, and Shared medical decision making was utilized in creating the patients health plan today.    History      44-year-old female here with complaints of flank pain.  She has multiple ER visits with various complaints.  She has flight of ideas in triage.  She states no real trauma blood the time he was wrong with me.  States the pain has been present for a few hours.    The history is provided by the patient. No  was used.        Physical Exam     Vitals signs and nursing note reviewed.   Vitals:    01/20/24 0303 01/20/24 0307   BP: 113/71    Pulse: 91    Resp: 15    Temp:  97.4 °F (36.3 °C)   TempSrc:  Oral   SpO2: 99%    Weight: 52.2 kg (115 lb)    Height: 1.702 m (5' 7\")        Physical Exam  Vitals and nursing note reviewed.   Constitutional:       General: She is not in acute distress.     Appearance: Normal appearance. She is

## 2024-01-20 NOTE — DISCHARGE INSTRUCTIONS
You have a urinary tract infection. Take the antibiotics to completion. Stop using methamphetamines.

## 2024-01-20 NOTE — ED NOTES
I have reviewed discharge instructions with the patient.  The patient verbalized understanding.    Patient left ED via Discharge Method: ambulatory to Home with self.    Opportunity for questions and clarification provided.       Patient given 1 scripts.     Patient threw discharge paperwork in the trash can prior to leaving.    To continue your aftercare when you leave the hospital, you may receive an automated call from our care team to check in on how you are doing.  This is a free service and part of our promise to provide the best care and service to meet your aftercare needs.” If you have questions, or wish to unsubscribe from this service please call [phone].  Thank you for Choosing our [facility] Emergency Department.         Elvia Gaona LPN  01/20/24 0436

## 2024-01-20 NOTE — ED TRIAGE NOTES
Patient arrives via self. Patient states she has right flank pain and painful urination. Patient states she has had blood in her urine and also had a fever.

## 2024-01-21 NOTE — ED PROVIDER NOTES
Emergency Department Provider Note       PCP: Israel Diallo Jr., MD   Age: 44 y.o.   Sex: female     DISPOSITION Decision To Discharge 01/20/2024 10:31:58 PM       ICD-10-CM    1. Methamphetamine abuse (HCC)  F15.10           Medical Decision Making     Complexity of Problems Addressed:  I reviewed external records: ED visit note from an outside group.  I reviewed external records: provider visit note from PCP.  I reviewed external records: previous lab results from outside ED.    Data Reviewed and Analyzed:  I independently ordered and reviewed each unique test.     Discussion of management or test interpretation.  Patient with flight of ideas and rapid and pressured speech.  I saw her this morning and she was positive for amphetamines.  She likely is under the influence of amphetamines again however she is not exhibiting any homicidal ideation or suicidal ideation.  No indication for acute management.  Risk of Complications and/or Morbidity of Patient Management:      History      Patient well-known to this department with almost daily visits presents with left hand pain.  Frequent complaint.  She is very difficult to redirect and obtain history and she appears acutely intoxicated with amphetamines.  She became very agitated in triage and began yelling at myself and the triage nurse.    The history is provided by the patient. No  was used.        Physical Exam     Vitals signs and nursing note reviewed.   There were no vitals filed for this visit.    Physical Exam  Vitals and nursing note reviewed.   Constitutional:       General: She is not in acute distress.     Appearance: Normal appearance. She is not ill-appearing, toxic-appearing or diaphoretic.   HENT:      Head: Normocephalic and atraumatic.      Nose: Nose normal.      Mouth/Throat:      Mouth: Mucous membranes are moist.   Eyes:      Pupils: Pupils are equal, round, and reactive to light.   Cardiovascular:      Rate and Rhythm:

## 2024-01-21 NOTE — ED NOTES
When brought back to triage pt started rambling speech and yelling at this RN and PA. Pt refusing to answer any questions and keeps yelling ramblings. Pt escorted out by security.     Jada Erazo RN  01/20/24 7168

## 2024-01-21 NOTE — ED NOTES
Patient refused vitals, refused to talk to PA and was discharged and escorted off property by security.      Alexandre Moraels, RN  01/21/24 0127

## 2024-01-26 VITALS
DIASTOLIC BLOOD PRESSURE: 80 MMHG | TEMPERATURE: 98 F | SYSTOLIC BLOOD PRESSURE: 122 MMHG | HEART RATE: 88 BPM | HEIGHT: 67 IN | OXYGEN SATURATION: 98 % | WEIGHT: 115 LBS | BODY MASS INDEX: 18.05 KG/M2 | RESPIRATION RATE: 18 BRPM

## 2024-01-26 PROCEDURE — 99283 EMERGENCY DEPT VISIT LOW MDM: CPT

## 2024-01-27 ENCOUNTER — HOSPITAL ENCOUNTER (EMERGENCY)
Age: 44
Discharge: HOME | End: 2024-01-27
Attending: EMERGENCY MEDICINE
Payer: MEDICARE

## 2024-01-27 DIAGNOSIS — R11.0 NAUSEA: ICD-10-CM

## 2024-01-27 DIAGNOSIS — R10.9 ABDOMINAL CRAMPING: Primary | ICD-10-CM

## 2024-01-27 LAB
ANION GAP SERPL CALC-SCNC: 2 MMOL/L (ref 2–11)
APPEARANCE UR: ABNORMAL
BASOPHILS # BLD: 0 K/UL (ref 0–0.2)
BASOPHILS NFR BLD: 1 % (ref 0–2)
BILIRUB UR QL: NEGATIVE
BUN SERPL-MCNC: 8 MG/DL (ref 6–23)
CALCIUM SERPL-MCNC: 9 MG/DL (ref 8.3–10.4)
CHLORIDE SERPL-SCNC: 106 MMOL/L (ref 103–113)
CO2 SERPL-SCNC: 28 MMOL/L (ref 21–32)
COLOR UR: ABNORMAL
CREAT SERPL-MCNC: 0.9 MG/DL (ref 0.6–1)
CRP SERPL-MCNC: <0.3 MG/DL (ref 0–0.9)
DIFFERENTIAL METHOD BLD: ABNORMAL
EOSINOPHIL # BLD: 0 K/UL (ref 0–0.8)
EOSINOPHIL NFR BLD: 0 % (ref 0.5–7.8)
ERYTHROCYTE [DISTWIDTH] IN BLOOD BY AUTOMATED COUNT: 14.4 % (ref 11.9–14.6)
GLUCOSE SERPL-MCNC: 105 MG/DL (ref 65–100)
GLUCOSE UR STRIP.AUTO-MCNC: NEGATIVE MG/DL
HCT VFR BLD AUTO: 35.7 % (ref 35.8–46.3)
HGB BLD-MCNC: 11.7 G/DL (ref 11.7–15.4)
HGB UR QL STRIP: NEGATIVE
IMM GRANULOCYTES # BLD AUTO: 0 K/UL (ref 0–0.5)
IMM GRANULOCYTES NFR BLD AUTO: 1 % (ref 0–5)
KETONES UR QL STRIP.AUTO: 15 MG/DL
LEUKOCYTE ESTERASE UR QL STRIP.AUTO: NEGATIVE
LYMPHOCYTES # BLD: 1 K/UL (ref 0.5–4.6)
LYMPHOCYTES NFR BLD: 15 % (ref 13–44)
MCH RBC QN AUTO: 28.8 PG (ref 26.1–32.9)
MCHC RBC AUTO-ENTMCNC: 32.8 G/DL (ref 31.4–35)
MCV RBC AUTO: 87.9 FL (ref 82–102)
MONOCYTES # BLD: 0.7 K/UL (ref 0.1–1.3)
MONOCYTES NFR BLD: 11 % (ref 4–12)
NEUTS SEG # BLD: 4.7 K/UL (ref 1.7–8.2)
NEUTS SEG NFR BLD: 72 % (ref 43–78)
NITRITE UR QL STRIP.AUTO: NEGATIVE
NRBC # BLD: 0 K/UL (ref 0–0.2)
PH UR STRIP: 6 (ref 5–9)
PLATELET # BLD AUTO: 225 K/UL (ref 150–450)
PMV BLD AUTO: 9.4 FL (ref 9.4–12.3)
POTASSIUM SERPL-SCNC: 3.5 MMOL/L (ref 3.5–5.1)
PROT UR STRIP-MCNC: NEGATIVE MG/DL
RBC # BLD AUTO: 4.06 M/UL (ref 4.05–5.2)
SODIUM SERPL-SCNC: 136 MMOL/L (ref 136–146)
SP GR UR REFRACTOMETRY: <1.005 (ref 1–1.02)
UROBILINOGEN UR QL STRIP.AUTO: 0.2 EU/DL (ref 0.2–1)
WBC # BLD AUTO: 6.4 K/UL (ref 4.3–11.1)

## 2024-01-27 PROCEDURE — 80048 BASIC METABOLIC PNL TOTAL CA: CPT

## 2024-01-27 PROCEDURE — 86140 C-REACTIVE PROTEIN: CPT

## 2024-01-27 PROCEDURE — 85025 COMPLETE CBC W/AUTO DIFF WBC: CPT

## 2024-01-27 PROCEDURE — 6370000000 HC RX 637 (ALT 250 FOR IP): Performed by: EMERGENCY MEDICINE

## 2024-01-27 PROCEDURE — 81003 URINALYSIS AUTO W/O SCOPE: CPT

## 2024-01-27 RX ORDER — ACETAMINOPHEN 325 MG/1
650 TABLET ORAL ONCE
Status: COMPLETED | OUTPATIENT
Start: 2024-01-27 | End: 2024-01-27

## 2024-01-27 RX ORDER — ONDANSETRON 4 MG/1
4 TABLET, ORALLY DISINTEGRATING ORAL 3 TIMES DAILY PRN
Qty: 9 TABLET | Refills: 0 | Status: SHIPPED | OUTPATIENT
Start: 2024-01-27 | End: 2024-01-30

## 2024-01-27 RX ORDER — DICYCLOMINE HYDROCHLORIDE 10 MG/1
10 CAPSULE ORAL 3 TIMES DAILY PRN
Qty: 15 CAPSULE | Refills: 0 | Status: SHIPPED | OUTPATIENT
Start: 2024-01-27 | End: 2024-02-01

## 2024-01-27 RX ORDER — LORAZEPAM 1 MG/1
0.5 TABLET ORAL EVERY 4 HOURS PRN
Status: DISCONTINUED | OUTPATIENT
Start: 2024-01-27 | End: 2024-01-27

## 2024-01-27 RX ORDER — METOCLOPRAMIDE 10 MG/1
10 TABLET ORAL
Status: DISCONTINUED | OUTPATIENT
Start: 2024-01-27 | End: 2024-01-27

## 2024-01-27 RX ORDER — HALOPERIDOL 1 MG/1
1 TABLET ORAL
Status: COMPLETED | OUTPATIENT
Start: 2024-01-27 | End: 2024-01-27

## 2024-01-27 RX ORDER — LORAZEPAM 1 MG/1
0.5 TABLET ORAL ONCE
Status: COMPLETED | OUTPATIENT
Start: 2024-01-27 | End: 2024-01-27

## 2024-01-27 RX ORDER — HALOPERIDOL 5 MG/1
5 TABLET ORAL
Status: DISCONTINUED | OUTPATIENT
Start: 2024-01-27 | End: 2024-01-27

## 2024-01-27 RX ADMIN — ACETAMINOPHEN 650 MG: 325 TABLET ORAL at 00:36

## 2024-01-27 RX ADMIN — HALOPERIDOL 1 MG: 1 TABLET ORAL at 00:57

## 2024-01-27 RX ADMIN — LORAZEPAM 0.5 MG: 1 TABLET ORAL at 00:36

## 2024-01-27 ASSESSMENT — LIFESTYLE VARIABLES
HOW OFTEN DO YOU HAVE A DRINK CONTAINING ALCOHOL: 2-3 TIMES A WEEK
HOW MANY STANDARD DRINKS CONTAINING ALCOHOL DO YOU HAVE ON A TYPICAL DAY: 1 OR 2

## 2024-01-27 ASSESSMENT — PAIN SCALES - GENERAL: PAINLEVEL_OUTOF10: 0

## 2024-01-27 NOTE — DISCHARGE INSTRUCTIONS
Please return for any questions, concerns or worsening symptoms, particularly increasing/unremitting abdominal pain, recurrent vomiting, increasing abdominal pain with fever, lethargy, ill appearance, inability to keep down fluids by mouth.

## 2024-01-27 NOTE — ED NOTES
I have reviewed discharge instructions with the patient.  The patient verbalized understanding.    Patient left ED via Discharge Method: ambulatory to Home with [employer] [facility].    Opportunity for questions and clarification provided.       Patient given 2 scripts.         To continue your aftercare when you leave the hospital, you may receive an automated call from our care team to check in on how you are doing.  This is a free service and part of our promise to provide the best care and service to meet your aftercare needs.” If you have questions, or wish to unsubscribe from this service please call [phone].  Thank you for Choosing our [facility] Emergency Department.        Alexandre Morales, RN  01/27/24 0222

## 2024-01-27 NOTE — ED PROVIDER NOTES
Emergency Department Provider Note                   PCP:                Israel Diallo Jr., MD               Age: 44 y.o.      Sex: female   Final diagnosis/impression:  1. Abdominal cramping    2. Nausea       Disposition: Discharged    MDM/Clinical Course:  Patient seen by myself at the [facility] emergency department. Patient had signs symptoms and clinical history most consistent with lower abdominal cramping symptoms, nausea, anxiety, overall appropriate vital signs, fairly benign abdominal exam. My independent analysis/interpretation of laboratory work-up here shows CBC is unremarkable, BMP is unremarkable, CRP is negative, urinalysis unremarkable.  I recommended patient undergo pregnancy testing for complete evaluation however patient declined which is within her rights as a patient. While under my care, patient received 0.5 mg p.o. Ativan, 1 mg p.o. Haldol, 650 mg p.o. Tylenol.  Patient requiring emergent imaging at this time given fairly benign abdominal exam, appropriate vitals, reassuring laboratory workup.  Patient well-appearing able to tolerate p.o. prior to discharge.  Outpatient prescriptions for Bentyl and Zofran ODT written.  Patient/family given instructions to return as needed for any questions, concerns or worsening symptoms, particularly those as outlined in the disposition section / discharge section of patient discharge paperwork. Patient/family verbalizes understanding and agreement with ED course/plan in shared medical decision making. Questions answered.    Did discuss with patient that medications given during visit / discharge prescriptions would not nessarily be those selected if patient were pregnant and such medications could potentially cause harm to a developing fetus or pregnancy. Patient is declining pregnancy testing during visit which is reasonable and within her choice as patient here in the emergency department.  Patient does not feel she could be pregnant.  impacting care include polysubstance use, schizoaffective disorder.  Shared medical decision making was utilized in creating the patients health plan today.   Work-up considered but not performed includes considered CT of the pelvis with IV contrast however patient with fairly benign abdominal exam, reassuring labs, generally recommended urine pregnancy testing however patient declines      No orders of the defined types were placed in this encounter.     ED Meds Given:  Medications - No data to display  New Prescriptions    No medications on file         ___________________  HPI: Angie Velasquez is a 44 y.o. female with past medical history of anxiety, polysubstance use, schizoaffective disorder, bipolar disorder presenting for evaluation of lower abdominal cramping symptoms.  Patient notes roughly 3-day history of lower abdominal cramping symptoms that are intermittent.  Patient denies dysuria, denies vaginal discharge or vaginal pain, denies concern for STI, denies vaginal bleeding or discharge symptoms.  Patient notes mild associated nausea but no vomiting, denies constipation obstipation or diarrheal symptoms.  Patient denies fever or chills. Patient/family denies any other evaluation for today's acute complaint. Patient/family denies any other aggravating or alleviating factors. Patient/family denies any other symptoms.    ROS:   All review of systems negative except as noted above in the history of the present illness.    Past Medical/ Family/ Social History:     Medical history:   Past Medical History:   Diagnosis Date    Alcohol withdrawal syndrome with complication (HCC) 10/11/2017    Anxiety and depression     anxiety, depression    Back pain     problems with L4-5 vertebra    Common bile duct dilatation 6/12/2013    Depression     Gall bladder disease 6/12/2013    Psychiatric disorder     Vaginal candida 11/6/2017     Surgical history:   Past Surgical History:   Procedure Laterality Date

## 2024-02-05 ENCOUNTER — OFFICE VISIT (OUTPATIENT)
Dept: FAMILY MEDICINE CLINIC | Age: 44
End: 2024-02-05

## 2024-02-05 VITALS
OXYGEN SATURATION: 99 % | WEIGHT: 126.54 LBS | HEIGHT: 66 IN | HEART RATE: 85 BPM | RESPIRATION RATE: 16 BRPM | BODY MASS INDEX: 20.34 KG/M2 | DIASTOLIC BLOOD PRESSURE: 76 MMHG | SYSTOLIC BLOOD PRESSURE: 118 MMHG | TEMPERATURE: 97.7 F

## 2024-02-05 DIAGNOSIS — L98.9 SKIN PROBLEM: Primary | ICD-10-CM

## 2024-02-05 DIAGNOSIS — R50.9 FEVER, UNSPECIFIED FEVER CAUSE: ICD-10-CM

## 2024-02-05 DIAGNOSIS — Z59.02 UNSHELTERED HOMELESSNESS: ICD-10-CM

## 2024-02-05 PROBLEM — F15.20 METHAMPHETAMINE USE DISORDER, SEVERE (HCC): Status: ACTIVE | Noted: 2022-10-12

## 2024-02-05 PROCEDURE — 99203 OFFICE O/P NEW LOW 30 MIN: CPT | Performed by: NURSE PRACTITIONER

## 2024-02-05 PROCEDURE — G8484 FLU IMMUNIZE NO ADMIN: HCPCS | Performed by: NURSE PRACTITIONER

## 2024-02-05 PROCEDURE — G8420 CALC BMI NORM PARAMETERS: HCPCS | Performed by: NURSE PRACTITIONER

## 2024-02-05 PROCEDURE — G8427 DOCREV CUR MEDS BY ELIG CLIN: HCPCS | Performed by: NURSE PRACTITIONER

## 2024-02-05 PROCEDURE — 1036F TOBACCO NON-USER: CPT | Performed by: NURSE PRACTITIONER

## 2024-02-05 RX ORDER — ESCITALOPRAM OXALATE 10 MG/1
10 TABLET ORAL DAILY
COMMUNITY
Start: 2019-12-04

## 2024-02-05 RX ORDER — BUSPIRONE HYDROCHLORIDE 10 MG/1
10 TABLET ORAL
COMMUNITY
Start: 2024-01-29

## 2024-02-05 RX ORDER — POTASSIUM CHLORIDE 750 MG/1
10 TABLET, FILM COATED, EXTENDED RELEASE ORAL EVERY MORNING
COMMUNITY
Start: 2023-11-10

## 2024-02-05 RX ORDER — GABAPENTIN 300 MG/1
600 CAPSULE ORAL 3 TIMES DAILY
COMMUNITY
Start: 2019-12-03

## 2024-02-05 RX ORDER — DOCUSATE SODIUM 50 MG AND SENNOSIDES 8.6 MG 8.6; 5 MG/1; MG/1
8.6-5 TABLET, FILM COATED ORAL
COMMUNITY
Start: 2023-11-27

## 2024-02-05 RX ORDER — NAPROXEN 500 MG/1
500 TABLET ORAL 2 TIMES DAILY WITH MEALS
Qty: 60 TABLET | Refills: 3 | Status: SHIPPED | OUTPATIENT
Start: 2024-02-05

## 2024-02-05 RX ORDER — BUPROPION HYDROCHLORIDE 300 MG/1
300 TABLET ORAL DAILY
COMMUNITY
Start: 2024-01-25

## 2024-02-05 RX ORDER — NAPROXEN 500 MG/1
500 TABLET ORAL EVERY 12 HOURS
COMMUNITY
Start: 2024-01-09

## 2024-02-05 RX ORDER — ONDANSETRON 4 MG/1
4 TABLET, FILM COATED ORAL
COMMUNITY
Start: 2024-01-29 | End: 2024-02-10

## 2024-02-05 RX ORDER — ZIPRASIDONE HYDROCHLORIDE 80 MG/1
80 CAPSULE ORAL 2 TIMES DAILY WITH MEALS
COMMUNITY
Start: 2019-12-03

## 2024-02-05 RX ORDER — FLUCONAZOLE 150 MG/1
150 TABLET ORAL
COMMUNITY
Start: 2024-01-06 | End: 2024-02-10

## 2024-02-05 RX ORDER — CEPHALEXIN 500 MG/1
500 CAPSULE ORAL 2 TIMES DAILY
Qty: 20 CAPSULE | Refills: 0 | Status: SHIPPED | OUTPATIENT
Start: 2024-02-05 | End: 2024-02-10

## 2024-02-05 RX ORDER — RISPERIDONE 1 MG/1
1 TABLET ORAL 2 TIMES DAILY
COMMUNITY
Start: 2019-12-03

## 2024-02-05 RX ORDER — DICYCLOMINE HCL 20 MG
20 TABLET ORAL
COMMUNITY
Start: 2024-01-29

## 2024-02-05 RX ORDER — OLANZAPINE 5 MG/1
5 TABLET ORAL
COMMUNITY
Start: 2024-01-23

## 2024-02-05 SDOH — ECONOMIC STABILITY - HOUSING INSECURITY: UNSHELTERED HOMELESSNESS: Z59.02

## 2024-02-05 ASSESSMENT — ENCOUNTER SYMPTOMS
ABDOMINAL DISTENTION: 0
WHEEZING: 0
COUGH: 0
SHORTNESS OF BREATH: 0
SINUS PAIN: 0
CHEST TIGHTNESS: 0
SINUS PRESSURE: 0
RHINORRHEA: 0

## 2024-02-05 NOTE — PROGRESS NOTES
1 tablet      ondansetron (ZOFRAN) 4 MG tablet 1 tablet      potassium chloride (KLOR-CON) 10 MEQ extended release tablet Take 1 tablet by mouth every morning      risperiDONE (RISPERDAL) 1 MG tablet Take 1 tablet by mouth 2 times daily      STIMULANT LAXATIVE 8.6-50 MG per tablet 8.6-50 tablets      ziprasidone (GEODON) 80 MG capsule Take 1 capsule by mouth 2 times daily (with meals)      busPIRone (BUSPAR) 10 MG tablet 1 tablet      dicyclomine (BENTYL) 20 MG tablet 1 tablet      cephALEXin (KEFLEX) 500 MG capsule Take 1 capsule by mouth 2 times daily for 10 days 20 capsule 0    naproxen (NAPROSYN) 500 MG tablet Take 1 tablet by mouth 2 times daily (with meals) 60 tablet 3    dicyclomine (BENTYL) 10 MG capsule Take 1 capsule by mouth 3 times daily as needed (Cramping) 15 capsule 0    benztropine (COGENTIN) 0.5 MG tablet Take 1 tablet by mouth 2 times daily 28 tablet 0    busPIRone (BUSPAR) 15 MG tablet Take 15 mg by mouth daily 14 tablet 0    QUEtiapine (SEROQUEL) 100 MG tablet Take 200 mg by mouth 2 times daily      cyproheptadine (PERIACTIN) 4 MG tablet Take 4 mg by mouth in the morning and at bedtime      divalproex (DEPAKOTE) 500 MG DR tablet Take 500 mg by mouth in the morning and at bedtime      prazosin (MINIPRESS) 2 MG capsule Take 2 mg by mouth nightly      ibuprofen (ADVIL;MOTRIN) 800 MG tablet Take 1 tablet by mouth every 8 hours as needed for Fever or Pain 120 tablet 3    pantoprazole (PROTONIX) 40 MG tablet Take 1 tablet by mouth every morning (before breakfast) 30 tablet 3    QUEtiapine (SEROQUEL) 100 MG tablet Take 1 tablet by mouth daily 60 tablet 3    QUEtiapine (SEROQUEL) 100 MG tablet Take 1 tablet by mouth 2 times daily 60 tablet 3    benztropine (COGENTIN) 1 MG tablet Take 1 mg by mouth 3 times daily as needed      divalproex (DEPAKOTE) 500 MG DR tablet TAKE 2 TABLETS BY MOUTH AT BEDTIME      magnesium citrate (CITROMA) SOLN Take 296 mLs by mouth daily as needed      mirtazapine (REMERON) 15

## 2024-02-10 ENCOUNTER — HOSPITAL ENCOUNTER (EMERGENCY)
Age: 44
Discharge: HOME | End: 2024-02-10
Payer: MEDICARE

## 2024-02-10 VITALS
DIASTOLIC BLOOD PRESSURE: 74 MMHG | HEART RATE: 85 BPM | WEIGHT: 120.8 LBS | OXYGEN SATURATION: 100 % | RESPIRATION RATE: 18 BRPM | BODY MASS INDEX: 18.96 KG/M2 | SYSTOLIC BLOOD PRESSURE: 129 MMHG | TEMPERATURE: 97.6 F | HEIGHT: 67 IN

## 2024-02-10 DIAGNOSIS — N30.00 ACUTE CYSTITIS WITHOUT HEMATURIA: Primary | ICD-10-CM

## 2024-02-10 DIAGNOSIS — N76.0 BACTERIAL VAGINOSIS: ICD-10-CM

## 2024-02-10 DIAGNOSIS — B96.89 BACTERIAL VAGINOSIS: ICD-10-CM

## 2024-02-10 DIAGNOSIS — B37.9 YEAST INFECTION: ICD-10-CM

## 2024-02-10 LAB
BACTERIA URNS QL MICRO: ABNORMAL /HPF
CASTS URNS QL MICRO: 0 /LPF
CRYSTALS URNS QL MICRO: 0 /LPF
EPI CELLS #/AREA URNS HPF: ABNORMAL /HPF
MUCOUS THREADS URNS QL MICRO: 0 /LPF
OTHER OBSERVATIONS: ABNORMAL
RBC #/AREA URNS HPF: ABNORMAL /HPF
SERVICE CMNT-IMP: NORMAL
WBC URNS QL MICRO: ABNORMAL /HPF
WET PREP GENITAL: NORMAL
YEAST URNS QL MICRO: ABNORMAL

## 2024-02-10 PROCEDURE — 81001 URINALYSIS AUTO W/SCOPE: CPT

## 2024-02-10 PROCEDURE — 99283 EMERGENCY DEPT VISIT LOW MDM: CPT

## 2024-02-10 PROCEDURE — 81025 URINE PREGNANCY TEST: CPT

## 2024-02-10 PROCEDURE — 87210 SMEAR WET MOUNT SALINE/INK: CPT

## 2024-02-10 PROCEDURE — 81015 MICROSCOPIC EXAM OF URINE: CPT

## 2024-02-10 PROCEDURE — 81003 URINALYSIS AUTO W/O SCOPE: CPT

## 2024-02-10 RX ORDER — FLUCONAZOLE 150 MG/1
150 TABLET ORAL ONCE
Qty: 1 TABLET | Refills: 0 | Status: SHIPPED | OUTPATIENT
Start: 2024-02-10 | End: 2024-02-10

## 2024-02-10 RX ORDER — METRONIDAZOLE 500 MG/1
500 TABLET ORAL 2 TIMES DAILY
Qty: 14 TABLET | Refills: 0 | Status: SHIPPED | OUTPATIENT
Start: 2024-02-10 | End: 2024-02-17

## 2024-02-10 RX ORDER — ONDANSETRON 4 MG/1
4 TABLET, FILM COATED ORAL 3 TIMES DAILY PRN
Qty: 15 TABLET | Refills: 0 | Status: SHIPPED | OUTPATIENT
Start: 2024-02-10

## 2024-02-10 RX ORDER — CEPHALEXIN 500 MG/1
500 CAPSULE ORAL 2 TIMES DAILY
Qty: 14 CAPSULE | Refills: 0 | Status: SHIPPED | OUTPATIENT
Start: 2024-02-10 | End: 2024-02-17

## 2024-02-10 ASSESSMENT — PAIN - FUNCTIONAL ASSESSMENT: PAIN_FUNCTIONAL_ASSESSMENT: 0-10

## 2024-02-10 ASSESSMENT — PAIN SCALES - GENERAL: PAINLEVEL_OUTOF10: 7

## 2024-02-10 NOTE — DISCHARGE INSTRUCTIONS
Start Keflex twice daily for the next 7 days.  Metronidazole for the next 7 days twice daily additionally.  Take Diflucan once.  Drink plenty of fluids.  Return to the emergency department for any worsening symptoms or concerns.

## 2024-02-10 NOTE — ED PROVIDER NOTES
Emergency Department Provider Note       PCP: Israel Diallo Jr., MD   Age: 44 y.o.   Sex: female     DISPOSITION Decision To Discharge 02/10/2024 03:16:57 AM       ICD-10-CM    1. Acute cystitis without hematuria  N30.00       2. Bacterial vaginosis  N76.0     B96.89       3. Yeast infection  B37.9           Medical Decision Making     Complexity of Problems Addressed:  1 or more acute illnesses that pose a threat to life or bodily function.     Data Reviewed and Analyzed:  I independently ordered and reviewed each unique test.  I reviewed external records: ED visit note from an outside group.           Discussion of management or test interpretation.  See ED course below  ED Course as of 02/10/24 0338   Sat Feb 10, 2024   0157 44-year-old female presents with vaginal discharge.  She had negative gonorrhea and chlamydia screening in January denies sexual activity since that time.  She did have negative HIV testing 2 days ago at [facility].  Declined repeat gonorrhea chlamydia testing.  Will obtain urinalysis and wet prep. [CJ]      ED Course User Index  [CJ] Tania Cerrato, APRN - CNP            Risk of Complications and/or Morbidity of Patient Management:  Prescription drug management performed.  Patient was discharged risks and benefits of hospitalization were considered.  Shared medical decision making was utilized in creating the patients health plan today.        History       44 year old female with history of methamphetamine abuse presents for vaginal discharge and dysuria. Reports the started around 2 days prior.  Reports that vaginal discharge has been dark at times.  She states that she has not had a menstrual cycle in around 1 year.  She does feel that there are things moving around inside of her body.  She denies having foreign body to her vagina.    The history is provided by the patient.        Review of Systems   Genitourinary:  Positive for vaginal discharge and vaginal pain.   All other systems

## 2024-02-10 NOTE — ED NOTES
I have reviewed discharge instructions with the patient.  The patient verbalized understanding.    Patient left ED via Discharge Method: ambulatory to Home with self.    Opportunity for questions and clarification provided.       Patient given 4 scripts.         To continue your aftercare when you leave the hospital, you may receive an automated call from our care team to check in on how you are doing.  This is a free service and part of our promise to provide the best care and service to meet your aftercare needs.” If you have questions, or wish to unsubscribe from this service please call [phone].  Thank you for Choosing our [facility] Emergency Department.        Tneisha Sutton RN  02/10/24 1029

## 2024-02-12 LAB
BILIRUB UR QL: NEGATIVE
GLUCOSE UR QL STRIP.AUTO: NEGATIVE MG/DL
HCG UR QL: NEGATIVE
KETONES UR-MCNC: ABNORMAL MG/DL
LEUKOCYTE ESTERASE UR QL STRIP: NEGATIVE
NITRITE UR QL: NEGATIVE
PH UR: 7 (ref 5–9)
PROT UR QL: NEGATIVE MG/DL
RBC # UR STRIP: ABNORMAL
SERVICE CMNT-IMP: ABNORMAL
SP GR UR: >1.03 (ref 1–1.02)
UROBILINOGEN UR QL: 0.2 EU/DL (ref 0.2–1)

## 2024-02-14 LAB
SERVICE CMNT-IMP: NORMAL
WET PREP GENITAL: NORMAL

## 2024-03-30 ENCOUNTER — HOSPITAL ENCOUNTER (EMERGENCY)
Age: 44
Discharge: HOME | End: 2024-03-30
Attending: EMERGENCY MEDICINE
Payer: MEDICARE

## 2024-03-30 VITALS
HEIGHT: 67 IN | BODY MASS INDEX: 17.27 KG/M2 | DIASTOLIC BLOOD PRESSURE: 82 MMHG | RESPIRATION RATE: 19 BRPM | TEMPERATURE: 98.2 F | OXYGEN SATURATION: 98 % | WEIGHT: 110 LBS | SYSTOLIC BLOOD PRESSURE: 117 MMHG | HEART RATE: 105 BPM

## 2024-03-30 DIAGNOSIS — F22 PARANOID BEHAVIOR (HCC): ICD-10-CM

## 2024-03-30 DIAGNOSIS — F29 PSYCHOSIS, UNSPECIFIED PSYCHOSIS TYPE (HCC): ICD-10-CM

## 2024-03-30 DIAGNOSIS — F14.10 COCAINE ABUSE (HCC): ICD-10-CM

## 2024-03-30 DIAGNOSIS — F15.10 METHAMPHETAMINE ABUSE (HCC): Primary | ICD-10-CM

## 2024-03-30 LAB
ALBUMIN SERPL-MCNC: 3.7 G/DL (ref 3.5–5)
ALBUMIN/GLOB SERPL: 1.2 (ref 0.4–1.6)
ALP SERPL-CCNC: 70 U/L (ref 50–136)
ALT SERPL-CCNC: 30 U/L (ref 12–65)
AMPHET UR QL SCN: POSITIVE
ANION GAP SERPL CALC-SCNC: 5 MMOL/L (ref 2–11)
AST SERPL-CCNC: 28 U/L (ref 15–37)
BARBITURATES UR QL SCN: NEGATIVE
BASOPHILS # BLD: 0 K/UL (ref 0–0.2)
BASOPHILS NFR BLD: 0 % (ref 0–2)
BENZODIAZ UR QL: NEGATIVE
BILIRUB SERPL-MCNC: 0.3 MG/DL (ref 0.2–1.1)
BILIRUB UR QL: ABNORMAL
BUN SERPL-MCNC: 8 MG/DL (ref 6–23)
CALCIUM SERPL-MCNC: 8.8 MG/DL (ref 8.3–10.4)
CANNABINOIDS UR QL SCN: NEGATIVE
CHLORIDE SERPL-SCNC: 108 MMOL/L (ref 103–113)
CO2 SERPL-SCNC: 26 MMOL/L (ref 21–32)
COCAINE UR QL SCN: POSITIVE
CREAT SERPL-MCNC: 1.1 MG/DL (ref 0.6–1)
DIFFERENTIAL METHOD BLD: NORMAL
EOSINOPHIL # BLD: 0.1 K/UL (ref 0–0.8)
EOSINOPHIL NFR BLD: 1 % (ref 0.5–7.8)
ERYTHROCYTE [DISTWIDTH] IN BLOOD BY AUTOMATED COUNT: 13.2 % (ref 11.9–14.6)
GLOBULIN SER CALC-MCNC: 3.2 G/DL (ref 2.8–4.5)
GLUCOSE SERPL-MCNC: 99 MG/DL (ref 65–100)
GLUCOSE UR QL STRIP.AUTO: NEGATIVE MG/DL
HCG UR QL: NEGATIVE
HCT VFR BLD AUTO: 36.6 % (ref 35.8–46.3)
HGB BLD-MCNC: 11.8 G/DL (ref 11.7–15.4)
IMM GRANULOCYTES # BLD AUTO: 0 K/UL (ref 0–0.5)
IMM GRANULOCYTES NFR BLD AUTO: 0 % (ref 0–5)
KETONES UR-MCNC: ABNORMAL MG/DL
LEUKOCYTE ESTERASE UR QL STRIP: NEGATIVE
LIPASE SERPL-CCNC: 62 U/L (ref 73–393)
LYMPHOCYTES # BLD: 1.5 K/UL (ref 0.5–4.6)
LYMPHOCYTES NFR BLD: 19 % (ref 13–44)
MCH RBC QN AUTO: 28.5 PG (ref 26.1–32.9)
MCHC RBC AUTO-ENTMCNC: 32.2 G/DL (ref 31.4–35)
MCV RBC AUTO: 88.4 FL (ref 82–102)
METHADONE UR QL: NEGATIVE
MONOCYTES # BLD: 0.8 K/UL (ref 0.1–1.3)
MONOCYTES NFR BLD: 10 % (ref 4–12)
NEUTS SEG # BLD: 5.3 K/UL (ref 1.7–8.2)
NEUTS SEG NFR BLD: 70 % (ref 43–78)
NITRITE UR QL: NEGATIVE
NRBC # BLD: 0 K/UL (ref 0–0.2)
OPIATES UR QL: NEGATIVE
PCP UR QL: NEGATIVE
PH UR: 6 (ref 5–9)
PLATELET # BLD AUTO: 217 K/UL (ref 150–450)
PMV BLD AUTO: 9.5 FL (ref 9.4–12.3)
POTASSIUM SERPL-SCNC: 3.6 MMOL/L (ref 3.5–5.1)
PROT SERPL-MCNC: 6.9 G/DL (ref 6.3–8.2)
PROT UR QL: ABNORMAL MG/DL
RBC # BLD AUTO: 4.14 M/UL (ref 4.05–5.2)
RBC # UR STRIP: ABNORMAL
SERVICE CMNT-IMP: ABNORMAL
SODIUM SERPL-SCNC: 139 MMOL/L (ref 136–146)
SP GR UR: 1.02 (ref 1–1.02)
UROBILINOGEN UR QL: 1 EU/DL (ref 0.2–1)
WBC # BLD AUTO: 7.7 K/UL (ref 4.3–11.1)

## 2024-03-30 PROCEDURE — 99283 EMERGENCY DEPT VISIT LOW MDM: CPT

## 2024-03-30 PROCEDURE — 85025 COMPLETE CBC W/AUTO DIFF WBC: CPT

## 2024-03-30 PROCEDURE — 83690 ASSAY OF LIPASE: CPT

## 2024-03-30 PROCEDURE — 80307 DRUG TEST PRSMV CHEM ANLYZR: CPT

## 2024-03-30 PROCEDURE — 81003 URINALYSIS AUTO W/O SCOPE: CPT

## 2024-03-30 PROCEDURE — 81025 URINE PREGNANCY TEST: CPT

## 2024-03-30 PROCEDURE — 80053 COMPREHEN METABOLIC PANEL: CPT

## 2024-03-30 ASSESSMENT — ENCOUNTER SYMPTOMS
EYE ITCHING: 0
COUGH: 0
SHORTNESS OF BREATH: 0
EYE PAIN: 0
SINUS PAIN: 0
BACK PAIN: 0
CONSTIPATION: 0
NAUSEA: 1
HEMATEMESIS: 0
VOMITING: 0
WHEEZING: 0
ABDOMINAL PAIN: 1
HEMATOCHEZIA: 0
DIARRHEA: 0
EYE REDNESS: 0
TROUBLE SWALLOWING: 0

## 2024-03-30 NOTE — DISCHARGE INSTRUCTIONS
Report called to Fransico Abad RN.       Kamar Fink RN  10/04/23 2019 Stop using illegal drugs    Continue all your prescription medications    Drink plenty of fluids    Follow-up with your primary care doctor Monday    Return to ER for any worsening symptoms or new problems which may arise

## 2024-03-30 NOTE — ED NOTES
Patient mobility status  with no difficulty. Provider aware     I have reviewed discharge instructions with the patient.  The patient verbalized understanding.    Patient left ED via Discharge Method: ambulatory to Home with  self .    Opportunity for questions and clarification provided.     Patient given 0 scripts.                 Biggs, Amanda, RN  03/30/24 2481

## 2024-03-30 NOTE — ED TRIAGE NOTES
Pt arrives ambulatory to triage with c/o lower abdominal/groin pain and \"swelling\" to bilateral feet, onset \"two or three days\". Pt restless during triage. Endorses nausea and discoloration of urine. Denies v/d, fever, chills, or body aches.

## 2024-03-30 NOTE — ED PROVIDER NOTES
Emergency Department Provider Note       PCP: Israel Diallo Jr., MD   Age: 44 y.o.   Sex: female     DISPOSITION Decision To Discharge 03/30/2024 05:33:24 PM       ICD-10-CM    1. Methamphetamine abuse (HCC)  F15.10       2. Cocaine abuse (HCC)  F14.10       3. Psychosis, unspecified psychosis type (HCC)  F29       4. Paranoid behavior (HCC)  F22           Medical Decision Making     44-year-old female patient with a history of chronic substance abuse here with multiple complaints  Appears to be very paranoid about multiple issues  And is fairly anxious as well  We will proceed with lab work to address the subjective swelling that she complains about in both of her feet as well as her abdominal pain    5:36 PM EDT  Patient's workup today negative for any acute pathology  UDS is positive for cocaine and methamphetamine which is consistent with her past history as well    Patient is otherwise stable at this point she will be discharged advised to discontinue using illegal drugs to maintain her current prescription drug regimen  Advised to call her primary care doctor Monday for follow-up visit       1 chronic illness with exacerbation.  Patient was discharged risks and benefits of hospitalization were considered.  Chronic medical problems impacting care include substance abuse history of psychosis.  Shared medical decision making was utilized in creating the patients health plan today.    I independently ordered and reviewed each unique test.  I reviewed external records: ED visit note from an outside group.  I reviewed external records: provider visit note from outside specialist.                   History     44-year-old female patient presents today with multiple complaints including abdominal pain bilateral foot swelling lesions on her lips  Patient is a frequent utilizer of the ER and is known to be a chronic methamphetamine abuser as well  She reports that the stomach skin area has gotten loose and  evening.    NAPROXEN (NAPROSYN) 500 MG TABLET    Take 1 tablet by mouth 2 times daily (with meals)    OLANZAPINE (ZYPREXA) 5 MG TABLET    1 tablet    ONDANSETRON (ZOFRAN) 4 MG TABLET    Take 1 tablet by mouth 3 times daily as needed for Nausea or Vomiting    PANTOPRAZOLE (PROTONIX) 40 MG TABLET    Take 1 tablet by mouth every morning (before breakfast)    POTASSIUM CHLORIDE (KLOR-CON) 10 MEQ EXTENDED RELEASE TABLET    Take 1 tablet by mouth every morning    PRAZOSIN (MINIPRESS) 2 MG CAPSULE    TAKE 1 CAPSULE BY MOUTH AT BEDTIME    PRAZOSIN (MINIPRESS) 2 MG CAPSULE    Take 2 mg by mouth nightly    QUETIAPINE (SEROQUEL) 100 MG TABLET    Take 1 tablet by mouth daily    QUETIAPINE (SEROQUEL) 100 MG TABLET    Take 1 tablet by mouth 2 times daily    QUETIAPINE (SEROQUEL) 100 MG TABLET    Take 200 mg by mouth 2 times daily    RISPERIDONE (RISPERDAL) 1 MG TABLET    Take 1 tablet by mouth 2 times daily    SENNA (SENOKOT) 8.6 MG TABLET    Take 1 tablet by mouth 2 times daily    STIMULANT LAXATIVE 8.6-50 MG PER TABLET    8.6-50 tablets    TRAZODONE (DESYREL) 50 MG TABLET    Take 50 mg by mouth    ZIPRASIDONE (GEODON) 80 MG CAPSULE    Take 1 capsule by mouth 2 times daily (with meals)        Results for orders placed or performed during the hospital encounter of 03/30/24   CBC with Auto Differential   Result Value Ref Range    WBC 7.7 4.3 - 11.1 K/uL    RBC 4.14 4.05 - 5.2 M/uL    Hemoglobin 11.8 11.7 - 15.4 g/dL    Hematocrit 36.6 35.8 - 46.3 %    MCV 88.4 82 - 102 FL    MCH 28.5 26.1 - 32.9 PG    MCHC 32.2 31.4 - 35.0 g/dL    RDW 13.2 11.9 - 14.6 %    Platelets 217 150 - 450 K/uL    MPV 9.5 9.4 - 12.3 FL    nRBC 0.00 0.0 - 0.2 K/uL    Differential Type AUTOMATED      Neutrophils % 70 43 - 78 %    Lymphocytes % 19 13 - 44 %    Monocytes % 10 4.0 - 12.0 %    Eosinophils % 1 0.5 - 7.8 %    Basophils % 0 0.0 - 2.0 %    Immature Granulocytes 0 0.0 - 5.0 %    Neutrophils Absolute 5.3 1.7 - 8.2 K/UL    Lymphocytes Absolute 1.5 0.5 -

## 2024-04-12 ENCOUNTER — HOSPITAL ENCOUNTER (EMERGENCY)
Age: 44
Discharge: HOME | End: 2024-04-12

## 2024-04-12 VITALS
OXYGEN SATURATION: 100 % | HEART RATE: 103 BPM | SYSTOLIC BLOOD PRESSURE: 114 MMHG | TEMPERATURE: 97.4 F | DIASTOLIC BLOOD PRESSURE: 78 MMHG | HEIGHT: 67 IN | BODY MASS INDEX: 17.23 KG/M2

## 2024-04-12 DIAGNOSIS — Z53.21 ELOPED FROM EMERGENCY DEPARTMENT: Primary | ICD-10-CM

## 2024-04-12 NOTE — ED PROVIDER NOTES
Emergency Department Provider Note       PCP: Israel Diallo Jr., MD   Age: 44 y.o.   Sex: female     DISPOSITION Eloped - Left Before Treatment Complete 04/12/2024 03:16:35 AM       ICD-10-CM    1. Eloped from emergency department  Z53.21           Medical Decision Making     See ED course below.  ED Course as of 04/12/24 0320   Fri Apr 12, 2024 0319 44-year-old female presents with request for HIV test.  She has history of methamphetamine abuse and appears to be under the impression of amphetamines at this time.  She came mildly disruptive and triage and left the emergency department. [CJ]      ED Course User Index  [CJ] Tania Cerrato FAVIAN, APRN - CNP     1 or more acute illnesses that pose a threat to life or bodily function.   Shared medical decision making was utilized in creating the patients health plan today.    I independently ordered and reviewed each unique test.  I reviewed external records: ED visit note from an outside group.                   History     44-year-old female with history of methamphetamine abuse presents for HIV testing.  She states that she feels that she may have been exposed to HIV despite having multiple negative test.  She states that there is an individual that has been receiving HIV treatment and her name.  She states that she feels that she also may have been exposed to HIV when she was bitten by an animal inside of the [facility].    The history is provided by the patient.       ROS     Review of Systems   Psychiatric/Behavioral:  Positive for agitation.    All other systems reviewed and are negative.       Physical Exam     Vitals signs and nursing note reviewed:  Vitals:    04/12/24 0309   BP: 114/78   Pulse: (!) 103   Temp: 97.4 °F (36.3 °C)   TempSrc: Oral   SpO2: 100%   Height: 1.702 m (5' 7\")      Physical Exam  Vitals and nursing note reviewed.   Constitutional:       Comments: Unkept   Pulmonary:      Effort: Pulmonary effort is normal.

## 2024-04-12 NOTE — ED NOTES
Pt left in the middle of triage and told Tania she did not want to be seen and walked out.      Lissa Weinstein, RN  04/12/24 4334

## 2024-04-12 NOTE — ED TRIAGE NOTES
Pt ambulatory to the ER lobby c/o symptoms of HIV. Per Pt she has had a fever, chills, and stomach pains. Pt would like to be tested for HIV. Pt believes she was exposed to HIV from an animal in the residential.

## 2024-04-20 ENCOUNTER — HOSPITAL ENCOUNTER (EMERGENCY)
Age: 44
Discharge: HOME | End: 2024-04-20
Payer: MEDICARE

## 2024-04-20 VITALS
WEIGHT: 108 LBS | SYSTOLIC BLOOD PRESSURE: 120 MMHG | RESPIRATION RATE: 16 BRPM | OXYGEN SATURATION: 98 % | HEIGHT: 67 IN | BODY MASS INDEX: 16.95 KG/M2 | DIASTOLIC BLOOD PRESSURE: 78 MMHG | TEMPERATURE: 98.1 F | HEART RATE: 73 BPM

## 2024-04-20 DIAGNOSIS — R30.0 DYSURIA: Primary | ICD-10-CM

## 2024-04-20 LAB
AMPHET UR QL SCN: NEGATIVE
APPEARANCE UR: CLEAR
BACTERIA URNS QL MICRO: NEGATIVE /HPF
BARBITURATES UR QL SCN: NEGATIVE
BENZODIAZ UR QL: NEGATIVE
BILIRUB UR QL: NEGATIVE
CANNABINOIDS UR QL SCN: NEGATIVE
CASTS URNS QL MICRO: ABNORMAL /LPF
COCAINE UR QL SCN: NEGATIVE
COLOR UR: ABNORMAL
EPI CELLS #/AREA URNS HPF: ABNORMAL /HPF
GLUCOSE UR STRIP.AUTO-MCNC: NEGATIVE MG/DL
HCG UR QL: NEGATIVE
HGB UR QL STRIP: ABNORMAL
KETONES UR QL STRIP.AUTO: NEGATIVE MG/DL
LEUKOCYTE ESTERASE UR QL STRIP.AUTO: NEGATIVE
METHADONE UR QL: NEGATIVE
NITRITE UR QL STRIP.AUTO: NEGATIVE
OPIATES UR QL: NEGATIVE
PCP UR QL: NEGATIVE
PH UR STRIP: 5.5 (ref 5–9)
PROT UR STRIP-MCNC: NEGATIVE MG/DL
RBC #/AREA URNS HPF: ABNORMAL /HPF
SP GR UR REFRACTOMETRY: 1.01 (ref 1–1.02)
UROBILINOGEN UR QL STRIP.AUTO: 0.2 EU/DL (ref 0.2–1)
WBC URNS QL MICRO: ABNORMAL /HPF

## 2024-04-20 PROCEDURE — 80307 DRUG TEST PRSMV CHEM ANLYZR: CPT

## 2024-04-20 PROCEDURE — 6370000000 HC RX 637 (ALT 250 FOR IP)

## 2024-04-20 PROCEDURE — 81025 URINE PREGNANCY TEST: CPT

## 2024-04-20 PROCEDURE — 81001 URINALYSIS AUTO W/SCOPE: CPT

## 2024-04-20 PROCEDURE — 99283 EMERGENCY DEPT VISIT LOW MDM: CPT

## 2024-04-20 RX ORDER — IBUPROFEN 600 MG/1
600 TABLET ORAL
Status: COMPLETED | OUTPATIENT
Start: 2024-04-20 | End: 2024-04-20

## 2024-04-20 RX ADMIN — IBUPROFEN 600 MG: 600 TABLET, FILM COATED ORAL at 05:57

## 2024-04-20 ASSESSMENT — PAIN - FUNCTIONAL ASSESSMENT: PAIN_FUNCTIONAL_ASSESSMENT: 0-10

## 2024-04-20 ASSESSMENT — PAIN DESCRIPTION - DESCRIPTORS
DESCRIPTORS: SHARP;STABBING
DESCRIPTORS: ACHING;CRAMPING

## 2024-04-20 ASSESSMENT — PAIN SCALES - GENERAL
PAINLEVEL_OUTOF10: 8
PAINLEVEL_OUTOF10: 7

## 2024-04-20 ASSESSMENT — PAIN DESCRIPTION - LOCATION
LOCATION: ABDOMEN
LOCATION: ABDOMEN

## 2024-04-20 ASSESSMENT — PAIN DESCRIPTION - ORIENTATION
ORIENTATION: LEFT
ORIENTATION: LEFT

## 2024-04-20 NOTE — ED NOTES
Patient mobility status  with no difficulty. Provider aware     I have reviewed discharge instructions with the patient.  The patient verbalized understanding.    Patient left ED via Discharge Method: ambulatory to Home with  self .    Opportunity for questions and clarification provided.     Patient given 0 scripts.            Swetha Zuluaga RN  04/20/24 0633

## 2024-04-20 NOTE — ED TRIAGE NOTES
Patient states she woke up from sleep with abdominal pain on the left side that woke her up from sleep. Patient states she feels nauseous. Patient states she feels was clammy and feels like she is \"bleeding on the inside of her body.\" Patient states she has been having occasional fevers. Patient states she has had multiple HIV tests and they have all been negative. Patient states her PCP is planning to have her thyroid checked soon.

## 2024-04-20 NOTE — ED PROVIDER NOTES
Emergency Department Provider Note       PCP: Israel Diallo Jr., MD   Age: 44 y.o.   Sex: female     DISPOSITION Decision To Discharge 04/20/2024 06:13:58 AM       ICD-10-CM    1. Dysuria  R30.0           Medical Decision Making     44-year-old female with history of polysubstance use presents for dysuria.  Reports that this started earlier tonight.  Urinalysis contaminated with epithelial cells.  Patient states she is feeling much better would like to leave.     1 or more acute illnesses that pose a threat to life or bodily function.   Patient was discharged risks and benefits of hospitalization were considered.  Shared medical decision making was utilized in creating the patients health plan today.    I independently ordered and reviewed each unique test.  I reviewed external records: ED visit note from an outside group.                   History     44-year-old female with history of polysubstance abuse presents for dysuria lower abdominal pain.  She reports that she awoke in a field where she is currently living with lower abdominal pain.  She states initially felt like a scratching sensation.  Reports that she had some burning when she peed.  Denies fever or chills.  She has had no vomiting or diarrhea.  She does have some concern that the illuminati may be after her.  She has no suicidal or homicidal ideations.    The history is provided by the patient.       ROS     Review of Systems   Constitutional:  Negative for chills, fatigue and fever.   Genitourinary:  Positive for dysuria.   Psychiatric/Behavioral:  Positive for agitation. The patient is hyperactive.    All other systems reviewed and are negative.       Physical Exam     Vitals signs and nursing note reviewed:  Vitals:    04/20/24 0449 04/20/24 0530   BP: (!) 128/90 112/84   Pulse: 75    Resp: 18    Temp: 97.6 °F (36.4 °C)    TempSrc: Oral    SpO2: 100% 100%   Weight: 49 kg (108 lb)    Height: 1.702 m (5' 7\")       Physical Exam  Vitals and

## 2024-04-21 ENCOUNTER — HOSPITAL ENCOUNTER (EMERGENCY)
Age: 44
Discharge: HOME | End: 2024-04-21
Attending: INTERNAL MEDICINE | Admitting: INTERNAL MEDICINE

## 2024-04-21 ENCOUNTER — HOSPITAL ENCOUNTER (EMERGENCY)
Age: 44
Discharge: LEFT BEFORE DISCHARGE | End: 2024-04-21
Payer: MEDICARE

## 2024-04-21 VITALS
HEART RATE: 72 BPM | TEMPERATURE: 97.3 F | HEIGHT: 67 IN | DIASTOLIC BLOOD PRESSURE: 60 MMHG | BODY MASS INDEX: 16.95 KG/M2 | WEIGHT: 108 LBS | SYSTOLIC BLOOD PRESSURE: 120 MMHG | OXYGEN SATURATION: 98 % | RESPIRATION RATE: 16 BRPM

## 2024-04-21 DIAGNOSIS — Z53.29 LEFT AGAINST MEDICAL ADVICE: Primary | ICD-10-CM

## 2024-04-21 LAB
ALBUMIN SERPL-MCNC: 3.9 G/DL (ref 3.5–5)
ALBUMIN/GLOB SERPL: 1.2 (ref 0.4–1.6)
ALP SERPL-CCNC: 62 U/L (ref 50–136)
ALT SERPL-CCNC: 28 U/L (ref 12–65)
ANION GAP SERPL CALC-SCNC: 0 MMOL/L (ref 2–11)
APPEARANCE UR: NORMAL
AST SERPL-CCNC: 25 U/L (ref 15–37)
BASOPHILS # BLD: 0 K/UL (ref 0–0.2)
BASOPHILS NFR BLD: 1 % (ref 0–2)
BILIRUB SERPL-MCNC: 0.6 MG/DL (ref 0.2–1.1)
BILIRUB UR QL: NEGATIVE
BUN SERPL-MCNC: 10 MG/DL (ref 6–23)
CALCIUM SERPL-MCNC: 9 MG/DL (ref 8.3–10.4)
CHLORIDE SERPL-SCNC: 108 MMOL/L (ref 103–113)
CO2 SERPL-SCNC: 31 MMOL/L (ref 21–32)
COLOR UR: NORMAL
CREAT SERPL-MCNC: 0.8 MG/DL (ref 0.6–1)
DIFFERENTIAL METHOD BLD: ABNORMAL
EOSINOPHIL # BLD: 0 K/UL (ref 0–0.8)
EOSINOPHIL NFR BLD: 1 % (ref 0.5–7.8)
ERYTHROCYTE [DISTWIDTH] IN BLOOD BY AUTOMATED COUNT: 13.7 % (ref 11.9–14.6)
GLOBULIN SER CALC-MCNC: 3.2 G/DL (ref 2.8–4.5)
GLUCOSE SERPL-MCNC: 89 MG/DL (ref 65–100)
GLUCOSE UR STRIP.AUTO-MCNC: NEGATIVE MG/DL
HCG UR QL: NEGATIVE
HCT VFR BLD AUTO: 39.4 % (ref 35.8–46.3)
HGB BLD-MCNC: 12.3 G/DL (ref 11.7–15.4)
HGB UR QL STRIP: NEGATIVE
IMM GRANULOCYTES # BLD AUTO: 0 K/UL (ref 0–0.5)
IMM GRANULOCYTES NFR BLD AUTO: 0 % (ref 0–5)
KETONES UR QL STRIP.AUTO: NEGATIVE MG/DL
LEUKOCYTE ESTERASE UR QL STRIP.AUTO: NEGATIVE
LIPASE SERPL-CCNC: 58 U/L (ref 73–393)
LYMPHOCYTES # BLD: 1.2 K/UL (ref 0.5–4.6)
LYMPHOCYTES NFR BLD: 32 % (ref 13–44)
MAGNESIUM SERPL-MCNC: 1.9 MG/DL (ref 1.8–2.4)
MCH RBC QN AUTO: 28.5 PG (ref 26.1–32.9)
MCHC RBC AUTO-ENTMCNC: 31.2 G/DL (ref 31.4–35)
MCV RBC AUTO: 91.2 FL (ref 82–102)
MONOCYTES # BLD: 0.4 K/UL (ref 0.1–1.3)
MONOCYTES NFR BLD: 9 % (ref 4–12)
NEUTS SEG # BLD: 2.2 K/UL (ref 1.7–8.2)
NEUTS SEG NFR BLD: 57 % (ref 43–78)
NITRITE UR QL STRIP.AUTO: NEGATIVE
NRBC # BLD: 0 K/UL (ref 0–0.2)
PH UR STRIP: 7 (ref 5–9)
PLATELET # BLD AUTO: 238 K/UL (ref 150–450)
PMV BLD AUTO: 8.9 FL (ref 9.4–12.3)
POTASSIUM SERPL-SCNC: 4 MMOL/L (ref 3.5–5.1)
PROT SERPL-MCNC: 7.1 G/DL (ref 6.3–8.2)
PROT UR STRIP-MCNC: NEGATIVE MG/DL
RBC # BLD AUTO: 4.32 M/UL (ref 4.05–5.2)
SODIUM SERPL-SCNC: 139 MMOL/L (ref 136–146)
SP GR UR REFRACTOMETRY: 1.01 (ref 1–1.02)
UROBILINOGEN UR QL STRIP.AUTO: 0.2 EU/DL (ref 0.2–1)
WBC # BLD AUTO: 3.8 K/UL (ref 4.3–11.1)

## 2024-04-21 PROCEDURE — 83690 ASSAY OF LIPASE: CPT

## 2024-04-21 PROCEDURE — 4500000002 HC ER NO CHARGE

## 2024-04-21 PROCEDURE — 99283 EMERGENCY DEPT VISIT LOW MDM: CPT

## 2024-04-21 PROCEDURE — 85025 COMPLETE CBC W/AUTO DIFF WBC: CPT

## 2024-04-21 PROCEDURE — 81025 URINE PREGNANCY TEST: CPT

## 2024-04-21 PROCEDURE — 83735 ASSAY OF MAGNESIUM: CPT

## 2024-04-21 PROCEDURE — 81003 URINALYSIS AUTO W/O SCOPE: CPT

## 2024-04-21 PROCEDURE — 80053 COMPREHEN METABOLIC PANEL: CPT

## 2024-04-21 RX ORDER — ONDANSETRON 2 MG/ML
4 INJECTION INTRAMUSCULAR; INTRAVENOUS
Status: DISCONTINUED | OUTPATIENT
Start: 2024-04-21 | End: 2024-04-21 | Stop reason: HOSPADM

## 2024-04-21 RX ORDER — 0.9 % SODIUM CHLORIDE 0.9 %
1000 INTRAVENOUS SOLUTION INTRAVENOUS ONCE
Status: DISCONTINUED | OUTPATIENT
Start: 2024-04-21 | End: 2024-04-21 | Stop reason: HOSPADM

## 2024-04-21 ASSESSMENT — PAIN DESCRIPTION - ORIENTATION: ORIENTATION: LOWER

## 2024-04-21 ASSESSMENT — PAIN SCALES - GENERAL: PAINLEVEL_OUTOF10: 10

## 2024-04-21 ASSESSMENT — PAIN DESCRIPTION - LOCATION: LOCATION: ABDOMEN

## 2024-04-21 ASSESSMENT — PAIN - FUNCTIONAL ASSESSMENT: PAIN_FUNCTIONAL_ASSESSMENT: 0-10

## 2024-04-21 NOTE — ED NOTES
Patient states she has to leave due to her making sure she can get her food at her shelter. Patient was offered a food tray here and denied. Patient left AMA. AMA form was signed by provider and patient. Patient was educated on leaving without treatment and risk involved.      Rylie Presley LPN  04/21/24 1118

## 2024-04-21 NOTE — ED PROVIDER NOTES
(NAPROSYN) 500 MG TABLET    Take 1 tablet by mouth in the morning and 1 tablet in the evening.    NAPROXEN (NAPROSYN) 500 MG TABLET    Take 1 tablet by mouth 2 times daily (with meals)    OLANZAPINE (ZYPREXA) 5 MG TABLET    1 tablet    ONDANSETRON (ZOFRAN) 4 MG TABLET    Take 1 tablet by mouth 3 times daily as needed for Nausea or Vomiting    PANTOPRAZOLE (PROTONIX) 40 MG TABLET    Take 1 tablet by mouth every morning (before breakfast)    POTASSIUM CHLORIDE (KLOR-CON) 10 MEQ EXTENDED RELEASE TABLET    Take 1 tablet by mouth every morning    PRAZOSIN (MINIPRESS) 2 MG CAPSULE    TAKE 1 CAPSULE BY MOUTH AT BEDTIME    PRAZOSIN (MINIPRESS) 2 MG CAPSULE    Take 2 mg by mouth nightly    QUETIAPINE (SEROQUEL) 100 MG TABLET    Take 1 tablet by mouth daily    QUETIAPINE (SEROQUEL) 100 MG TABLET    Take 1 tablet by mouth 2 times daily    QUETIAPINE (SEROQUEL) 100 MG TABLET    Take 200 mg by mouth 2 times daily    RISPERIDONE (RISPERDAL) 1 MG TABLET    Take 1 tablet by mouth 2 times daily    SENNA (SENOKOT) 8.6 MG TABLET    Take 1 tablet by mouth 2 times daily    STIMULANT LAXATIVE 8.6-50 MG PER TABLET    8.6-50 tablets    TRAZODONE (DESYREL) 50 MG TABLET    Take 50 mg by mouth    ZIPRASIDONE (GEODON) 80 MG CAPSULE    Take 1 capsule by mouth 2 times daily (with meals)        Results for orders placed or performed during the hospital encounter of 04/21/24   Magnesium   Result Value Ref Range    Magnesium 1.9 1.8 - 2.4 mg/dL   Lipase   Result Value Ref Range    Lipase 58 (L) 73 - 393 U/L   CMP   Result Value Ref Range    Sodium 139 136 - 146 mmol/L    Potassium 4.0 3.5 - 5.1 mmol/L    Chloride 108 103 - 113 mmol/L    CO2 31 21 - 32 mmol/L    Anion Gap 0 (L) 2 - 11 mmol/L    Glucose 89 65 - 100 mg/dL    BUN 10 6 - 23 MG/DL    Creatinine 0.80 0.6 - 1.0 MG/DL    Est, Glom Filt Rate >90 >60 ml/min/1.73m2    Calcium 9.0 8.3 - 10.4 MG/DL    Total Bilirubin 0.6 0.2 - 1.1 MG/DL    ALT 28 12 - 65 U/L    AST 25 15 - 37 U/L    Alk

## 2024-04-21 NOTE — ED TRIAGE NOTES
Pt arrives ambulatory to triage with c/o continued lower abdominal pain, burning with urination, and odorous urine.

## 2024-04-22 ENCOUNTER — HOSPITAL ENCOUNTER (EMERGENCY)
Age: 44
Discharge: HOME | End: 2024-04-22
Attending: EMERGENCY MEDICINE
Payer: MEDICARE

## 2024-04-22 VITALS
OXYGEN SATURATION: 100 % | HEART RATE: 81 BPM | HEIGHT: 67 IN | WEIGHT: 108 LBS | SYSTOLIC BLOOD PRESSURE: 116 MMHG | TEMPERATURE: 97.5 F | BODY MASS INDEX: 16.95 KG/M2 | RESPIRATION RATE: 16 BRPM | DIASTOLIC BLOOD PRESSURE: 73 MMHG

## 2024-04-22 DIAGNOSIS — K08.89 PAIN, DENTAL: Primary | ICD-10-CM

## 2024-04-22 PROCEDURE — 99283 EMERGENCY DEPT VISIT LOW MDM: CPT

## 2024-04-22 PROCEDURE — 6370000000 HC RX 637 (ALT 250 FOR IP): Performed by: EMERGENCY MEDICINE

## 2024-04-22 RX ORDER — ONDANSETRON 8 MG/1
8 TABLET, ORALLY DISINTEGRATING ORAL
Status: COMPLETED | OUTPATIENT
Start: 2024-04-22 | End: 2024-04-22

## 2024-04-22 RX ORDER — TRAMADOL HYDROCHLORIDE 50 MG/1
100 TABLET ORAL
Status: COMPLETED | OUTPATIENT
Start: 2024-04-22 | End: 2024-04-22

## 2024-04-22 RX ORDER — ONDANSETRON 8 MG/1
8 TABLET, ORALLY DISINTEGRATING ORAL EVERY 8 HOURS PRN
Qty: 12 TABLET | Refills: 1 | Status: SHIPPED | OUTPATIENT
Start: 2024-04-22

## 2024-04-22 RX ORDER — AMOXICILLIN 500 MG/1
1000 CAPSULE ORAL
Status: COMPLETED | OUTPATIENT
Start: 2024-04-22 | End: 2024-04-22

## 2024-04-22 RX ORDER — IBUPROFEN 800 MG/1
800 TABLET ORAL EVERY 8 HOURS PRN
Qty: 21 TABLET | Refills: 0 | Status: SHIPPED | OUTPATIENT
Start: 2024-04-22 | End: 2024-04-23

## 2024-04-22 RX ORDER — AMOXICILLIN 875 MG/1
875 TABLET, COATED ORAL 2 TIMES DAILY
Qty: 14 TABLET | Refills: 0 | Status: SHIPPED | OUTPATIENT
Start: 2024-04-22 | End: 2024-04-29

## 2024-04-22 RX ORDER — IBUPROFEN 800 MG/1
800 TABLET ORAL
Status: COMPLETED | OUTPATIENT
Start: 2024-04-22 | End: 2024-04-22

## 2024-04-22 RX ADMIN — IBUPROFEN 800 MG: 800 TABLET, FILM COATED ORAL at 22:25

## 2024-04-22 RX ADMIN — ONDANSETRON 8 MG: 8 TABLET, ORALLY DISINTEGRATING ORAL at 22:24

## 2024-04-22 RX ADMIN — AMOXICILLIN 1000 MG: 500 CAPSULE ORAL at 22:24

## 2024-04-22 RX ADMIN — TRAMADOL HYDROCHLORIDE 100 MG: 50 TABLET ORAL at 22:25

## 2024-04-22 ASSESSMENT — PAIN - FUNCTIONAL ASSESSMENT: PAIN_FUNCTIONAL_ASSESSMENT: 0-10

## 2024-04-22 ASSESSMENT — PAIN SCALES - GENERAL: PAINLEVEL_OUTOF10: 10

## 2024-04-22 ASSESSMENT — PAIN DESCRIPTION - LOCATION: LOCATION: TEETH

## 2024-04-23 ENCOUNTER — HOSPITAL ENCOUNTER (EMERGENCY)
Age: 44
Discharge: HOME | End: 2024-04-23
Attending: EMERGENCY MEDICINE
Payer: MEDICARE

## 2024-04-23 VITALS
RESPIRATION RATE: 20 BRPM | OXYGEN SATURATION: 100 % | WEIGHT: 115 LBS | TEMPERATURE: 97.9 F | DIASTOLIC BLOOD PRESSURE: 65 MMHG | HEART RATE: 81 BPM | SYSTOLIC BLOOD PRESSURE: 93 MMHG | BODY MASS INDEX: 18.01 KG/M2

## 2024-04-23 DIAGNOSIS — K02.9 DENTAL CARIES: Primary | ICD-10-CM

## 2024-04-23 PROCEDURE — 99283 EMERGENCY DEPT VISIT LOW MDM: CPT

## 2024-04-23 RX ORDER — MELOXICAM 7.5 MG/1
7.5 TABLET ORAL DAILY
Qty: 10 TABLET | Refills: 0 | Status: SHIPPED | OUTPATIENT
Start: 2024-04-23

## 2024-04-23 ASSESSMENT — PAIN DESCRIPTION - LOCATION: LOCATION: TEETH

## 2024-04-23 ASSESSMENT — PAIN SCALES - GENERAL: PAINLEVEL_OUTOF10: 10

## 2024-04-23 ASSESSMENT — PAIN DESCRIPTION - DESCRIPTORS: DESCRIPTORS: ACHING

## 2024-04-23 ASSESSMENT — PAIN - FUNCTIONAL ASSESSMENT: PAIN_FUNCTIONAL_ASSESSMENT: 0-10

## 2024-04-23 ASSESSMENT — PAIN DESCRIPTION - ORIENTATION: ORIENTATION: LEFT

## 2024-04-23 NOTE — ED PROVIDER NOTES
Emergency Department Provider Note       PCP: None, None   Age: 44 y.o.   Sex: female     DISPOSITION Decision To Discharge 04/23/2024 06:51:28 AM       ICD-10-CM    1. Dental caries  K02.9           Medical Decision Making     Patient is a 44-year-old female who presents with left upper molar dental pain.  Patient was seen yesterday and given a prescription for amoxicillin as well as ibuprofen and she wants something stronger.  Patient denies any fevers or chills or facial swelling.        Dental Pain  Location:  Upper  Upper teeth location: Left upper molar area.  Quality:  Aching and dull  Onset quality:  Gradual  Duration:  3 days  Timing:  Intermittent  Progression:  Waxing and waning  Chronicity:  Chronic  Context: dental fracture    Context: not abscess     Differential diagnosis includes was not limited to left upper molar dental caries    Patient's physical exam remarkable for left upper molar dental caries and dental fracture old poor dentition.    Patient was given a prescription for Toradol     1 or more acute illnesses that pose a threat to life or bodily function.   Prescription drug management performed.  Shared medical decision making was utilized in creating the patients health plan today.    I independently ordered and reviewed each unique test.                     History     Patient is a 44-year-old female who presents with left upper molar dental pain.  Patient was seen yesterday and given a prescription for amoxicillin as well as ibuprofen and she wants something stronger.  Patient denies any fevers or chills or facial swelling.        Dental Pain  Location:  Upper  Upper teeth location: Left upper molar area.  Quality:  Aching and dull  Onset quality:  Gradual  Duration:  3 days  Timing:  Intermittent  Progression:  Waxing and waning  Chronicity:  Chronic  Context: dental fracture    Context: not abscess        ROS     Review of Systems   HENT:  Positive for dental problem.    All other

## 2024-04-23 NOTE — ED NOTES
Patient mobility status  with no difficulty. Provider aware     I have reviewed discharge instructions with the patient.  The patient verbalized understanding.    Patient left ED via Discharge Method: ambulatory to Home with  self .    Opportunity for questions and clarification provided.     Patient given 2 e-scripts.           Jennifer Velasquez LPN  04/22/24 9193

## 2024-04-23 NOTE — ED NOTES
Patient mobility status  with no difficulty. Provider aware     I have reviewed discharge instructions with the patient.  The patient verbalized understanding.    Patient left ED via Discharge Method: ambulatory to [facility] LIVES ON THE  with  SELF  .    Opportunity for questions and clarification provided.     Patient given 1 scripts.

## 2024-04-23 NOTE — ED TRIAGE NOTES
Dental pain      Came in asking for hot chocolate, explained there was vending. She asked was this a peds er or an er , she then checked in for dental pain      Patient is homeless , has belongings with her.

## 2024-04-23 NOTE — DISCHARGE INSTRUCTIONS
Dental Services     The Emergency Department is not able to provide dental services - tooth extractions, root canal. Though we can provide antibiotics for abccess or infection. Listed below are free or low-cost options.    [facility] [address] [place] [address]   [phone]  Tuesday and Thursday- registration starts at 10am. After registering you are given a time to return  Provides free x-rays, extractions, treatment of infection, and dental hygeine.   Cannot have medicare, medicaid or other medical insurance coverage  Bring proof of [facility]** residency (power bill, for example), Social Security Number and household income documents (including food stamps) as well as any current medications.    (**if you do not live in [facility], contact the free [facility] in your home county for the availability of dental services)    [address] [address] [phone]  Monday and Wednesday 8a-5p Tuesday and Thursday    8a-7p Friday 12-5p  Provides Adult and Childrens services. X-rays, extractions, treatment of infection, restorative care  Accepts medicaid, private insurance, self-pay. Fees are on a sliding scale based on income (need to bring documentation)    [facility] [address] [place] [address]     [phone]  Monday - Friday 8am-5p  Accepts medicaid for dental (but not dentures under 21)  Provides xrays, extractions, partials and dentures    [facility] [address] [place] [address], [address], [place] [address] [phone]  Monday- Friday 9a-5p  First Come- First Served  Accepts medicaid, or self pay at or near medicaid price.  Urgent Care only for xrays, extractions fillings and infections    [facility] -- Call [facility] [phone],   --- request a visit from the [facility]  --- follow the prompts and leave a message  ---A nurse will call you

## 2024-04-24 ASSESSMENT — ENCOUNTER SYMPTOMS
CHOKING: 0
FACIAL SWELLING: 0
STRIDOR: 0

## 2024-04-24 NOTE — ED PROVIDER NOTES
Emergency Department Provider Note       PCP: None, None (Inactive)   Age: 44 y.o.   Sex: female     DISPOSITION Decision To Discharge 04/22/2024 09:54:27 PM       ICD-10-CM    1. Pain, dental  K08.89           Medical Decision Making   Periapical abscess, no facial swelling start antibiotics and NSAIDs.  A couple     1 acute, uncomplicated illness or injury.  Over the counter drug management performed.  Patient was discharged risks and benefits of hospitalization were considered.  Shared medical decision making was utilized in creating the patients health plan today.    I independently ordered and reviewed each unique test.  I reviewed external records: ED visit note from an outside group.                   History     44-year-old female presents to the ER for dental pain along the left mandibular premolar where her anterior molars used to be.  No acute fracture, she does have some broken off molars which is mostly healed over that she has been intending to get removed but has not yet        ROS     Review of Systems   Constitutional:  Negative for chills and fever.   HENT:  Positive for dental problem. Negative for drooling and facial swelling.    Respiratory:  Negative for choking and stridor.    All other systems reviewed and are negative.       Physical Exam     Vitals signs and nursing note reviewed:  Vitals:    04/22/24 2123 04/22/24 2125 04/22/24 2228   BP:  109/77 116/73   Pulse:  88 81   Resp: 15  16   Temp:  98.2 °F (36.8 °C) 97.5 °F (36.4 °C)   TempSrc:   Oral   SpO2:  99% 100%   Weight: 49 kg (108 lb)     Height: 1.702 m (5' 7\")        Physical Exam  Vitals and nursing note reviewed.   Constitutional:       General: She is not in acute distress.     Appearance: Normal appearance. She is not ill-appearing, toxic-appearing or diaphoretic.   HENT:      Head: Normocephalic and atraumatic.      Right Ear: External ear normal.      Left Ear: External ear normal.      Nose: Nose normal. No rhinorrhea.

## 2024-05-04 ENCOUNTER — HOSPITAL ENCOUNTER (EMERGENCY)
Age: 44
Discharge: HOME | End: 2024-05-04
Payer: MEDICARE

## 2024-05-04 ENCOUNTER — APPOINTMENT (OUTPATIENT)
Dept: GENERAL RADIOLOGY | Age: 44
End: 2024-05-04
Payer: MEDICARE

## 2024-05-04 VITALS
RESPIRATION RATE: 16 BRPM | TEMPERATURE: 97.9 F | DIASTOLIC BLOOD PRESSURE: 89 MMHG | BODY MASS INDEX: 18.05 KG/M2 | WEIGHT: 115 LBS | HEART RATE: 94 BPM | HEIGHT: 67 IN | OXYGEN SATURATION: 100 % | SYSTOLIC BLOOD PRESSURE: 122 MMHG

## 2024-05-04 VITALS
HEART RATE: 88 BPM | SYSTOLIC BLOOD PRESSURE: 134 MMHG | OXYGEN SATURATION: 100 % | TEMPERATURE: 98.1 F | DIASTOLIC BLOOD PRESSURE: 80 MMHG

## 2024-05-04 DIAGNOSIS — Z71.1 FEARED CONDITION NOT DEMONSTRATED: Primary | ICD-10-CM

## 2024-05-04 LAB
BILIRUB UR QL: NEGATIVE
GLUCOSE UR QL STRIP.AUTO: NEGATIVE MG/DL
HCG UR QL: NEGATIVE
KETONES UR-MCNC: 40 MG/DL
LEUKOCYTE ESTERASE UR QL STRIP: NEGATIVE
NITRITE UR QL: NEGATIVE
PH UR: 5.5 (ref 5–9)
PROT UR QL: NEGATIVE MG/DL
RBC # UR STRIP: ABNORMAL
SERVICE CMNT-IMP: ABNORMAL
SP GR UR: >1.03 (ref 1–1.02)
UROBILINOGEN UR QL: 0.2 EU/DL (ref 0.2–1)

## 2024-05-04 PROCEDURE — 81003 URINALYSIS AUTO W/O SCOPE: CPT

## 2024-05-04 PROCEDURE — 99283 EMERGENCY DEPT VISIT LOW MDM: CPT

## 2024-05-04 PROCEDURE — 81025 URINE PREGNANCY TEST: CPT

## 2024-05-04 PROCEDURE — 99281 EMR DPT VST MAYX REQ PHY/QHP: CPT

## 2024-05-04 PROCEDURE — 74018 RADEX ABDOMEN 1 VIEW: CPT

## 2024-05-04 ASSESSMENT — ENCOUNTER SYMPTOMS
DIARRHEA: 0
NAUSEA: 0
ABDOMINAL PAIN: 0
VOMITING: 0

## 2024-05-04 ASSESSMENT — PAIN - FUNCTIONAL ASSESSMENT: PAIN_FUNCTIONAL_ASSESSMENT: NONE - DENIES PAIN

## 2024-05-04 NOTE — ED NOTES
Called patient x3 times and patient was in the waiting area. Patient left before given discharge instructions and paperwork.      Rylie Presley LPN  05/04/24 5383

## 2024-05-04 NOTE — ED TRIAGE NOTES
Pt feels like L kidney has dropped into her abdominal area. Denies urinary c/o. Intermittent pain. Denies N/V/D or fever.

## 2024-05-04 NOTE — ED PROVIDER NOTES
Emergency Department Provider Note       PCP: None, None (Inactive)   Age: 44 y.o.   Sex: female     DISPOSITION Decision To Discharge 05/04/2024 01:21:50 PM       ICD-10-CM    1. Feared condition not demonstrated  Z71.1           Medical Decision Making     Patient again reported to ED with concern of abdominal foreign body.  Refer to HPI.  Had KUB x-ray imaging earlier this morning which is negative for retained foreign body.  I have shown the patient her x-rays that were done earlier this morning and demonstrated to her that there is no foreign body present in the abdomen.  No palpable masses or irregular structures palpated in the left lower quadrant of the abdomen where she was voicing concerns.  She was also expressing concerns/delusions that somebody had changed her HIV results in the computer but she has had 2 negative HIV tests in the past per chart review from [facility] in January and February of this year.  Discharge home.  Follow-up with PCP.     1 acute, uncomplicated illness or injury.  Patient was discharged risks and benefits of hospitalization were considered.  Shared medical decision making was utilized in creating the patients health plan today.    I independently ordered and reviewed each unique test.                     History     44-year-old female with history of polysubstance abuse presents to emergency department with concerns of HIV.  States she has been tested 5 times for HIV but she is concerned that someone is changing her results in the computer that it is a false negative.  She was seen earlier this morning in this department approximately 9 hours ago with similar concerns of having a feared condition.  Refer to that provider's note.  She is presenting today with the same concern that \"my kidney has slipped from my back into my belly and it was hanging out the side earlier this morning.\"    The history is provided by the patient.     Physical Exam     Vitals signs and nursing  morning and 1 tablet in the evening.    NAPROXEN (NAPROSYN) 500 MG TABLET    Take 1 tablet by mouth 2 times daily (with meals)    OLANZAPINE (ZYPREXA) 5 MG TABLET    1 tablet    ONDANSETRON (ZOFRAN) 4 MG TABLET    Take 1 tablet by mouth 3 times daily as needed for Nausea or Vomiting    ONDANSETRON (ZOFRAN-ODT) 8 MG TBDP DISINTEGRATING TABLET    Place 1 tablet under the tongue every 8 hours as needed for Nausea or Vomiting    PANTOPRAZOLE (PROTONIX) 40 MG TABLET    Take 1 tablet by mouth every morning (before breakfast)    POTASSIUM CHLORIDE (KLOR-CON) 10 MEQ EXTENDED RELEASE TABLET    Take 1 tablet by mouth every morning    PRAZOSIN (MINIPRESS) 2 MG CAPSULE    TAKE 1 CAPSULE BY MOUTH AT BEDTIME    PRAZOSIN (MINIPRESS) 2 MG CAPSULE    Take 2 mg by mouth nightly    QUETIAPINE (SEROQUEL) 100 MG TABLET    Take 1 tablet by mouth daily    QUETIAPINE (SEROQUEL) 100 MG TABLET    Take 1 tablet by mouth 2 times daily    QUETIAPINE (SEROQUEL) 100 MG TABLET    Take 200 mg by mouth 2 times daily    RISPERIDONE (RISPERDAL) 1 MG TABLET    Take 1 tablet by mouth 2 times daily    SENNA (SENOKOT) 8.6 MG TABLET    Take 1 tablet by mouth 2 times daily    STIMULANT LAXATIVE 8.6-50 MG PER TABLET    8.6-50 tablets    TRAZODONE (DESYREL) 50 MG TABLET    Take 50 mg by mouth    ZIPRASIDONE (GEODON) 80 MG CAPSULE    Take 1 capsule by mouth 2 times daily (with meals)        Results for orders placed or performed during the hospital encounter of 05/04/24   XR ABDOMEN (KUB) (SINGLE AP VIEW)    Narrative    Clinical History/Indication for Exam:  eval foriegn body : eval foriegn body    RADIOGRAPHS OF THE ABDOMEN 2 VIEWS    INDICATION:  eval foriegn body : eval foriegn body    COMPARISON:  No relevant prior studies available.    FINDINGS:    Intraperitoneal space:  See below.    Gastrointestinal tract:  Unremarkable.  No dilation.    Bones/joints:  Unremarkable.  No acute fracture.    Soft tissues:  No radiopaque foreign bodies.  There are clips

## 2024-05-04 NOTE — ED NOTES
Patient mobility status  with no difficulty. Provider aware     I have reviewed discharge instructions with the patient.  The patient verbalized understanding.    Patient left ED via Discharge Method: ambulatory to Home with self    Opportunity for questions and clarification provided.     Patient given 0 scripts.           Vani Mora RN  05/04/24 0643

## 2024-05-04 NOTE — ED PROVIDER NOTES
Emergency Department Provider Note       PCP: None, None (Inactive)   Age: 44 y.o.   Sex: female     DISPOSITION Decision To Discharge 05/04/2024 06:37:10 AM       ICD-10-CM    1. Feared condition not demonstrated  Z71.1           Medical Decision Making     44 year old female with history of polysubstance abuse presents for concern that her left kidney is malpositioned. KUB shows no abnormalities. UA normal Patient has long standing history of polysubstance abuse and psychosis.    ED Course as of 05/04/24 0639   Sat May 04, 2024   0637 KUB:  IMPRESSION:  No radiopaque foreign bodies.   [CJ]      ED Course User Index  [CJ] Tania Cerrato, APRN - CNP     1 or more acute illnesses that pose a threat to life or bodily function.   Patient was discharged risks and benefits of hospitalization were considered.  Shared medical decision making was utilized in creating the patients health plan today.    I independently ordered and reviewed each unique test.  I reviewed external records: ED visit note from an outside group.  I reviewed external records: provider visit note from PCP.     I interpreted the X-rays no acute.              History     44-year-old female with history of polysubstance abuse presents for sensation that her kidney has dropped into her lower abdomen.  She reports that she was inspecting her abdomen earlier tonight when she felt there was a bulge in her left lower abdomen.  She states this was approximately 1 cm wide.  She denies pain.  She has had no issues with urination.    The history is provided by the patient.       ROS     Review of Systems   Constitutional:  Negative for chills, fatigue and fever.   Gastrointestinal:  Negative for abdominal pain, diarrhea, nausea and vomiting.   Genitourinary:  Negative for dysuria and flank pain.   All other systems reviewed and are negative.       Physical Exam     Vitals signs and nursing note reviewed:  Vitals:    05/04/24 0449   BP: 121/72   Pulse: 91    Resp: 19   Temp: 97.9 °F (36.6 °C)   TempSrc: Oral   SpO2: 99%   Weight: 52.2 kg (115 lb)   Height: 1.702 m (5' 7\")      Physical Exam  Vitals and nursing note reviewed.   Constitutional:       Appearance: Normal appearance.   HENT:      Head: Normocephalic and atraumatic.   Cardiovascular:      Rate and Rhythm: Normal rate and regular rhythm.      Pulses: Normal pulses.      Heart sounds: Normal heart sounds.   Pulmonary:      Effort: Pulmonary effort is normal.      Breath sounds: Normal breath sounds.   Abdominal:      General: Abdomen is flat. There is no distension.      Palpations: Abdomen is soft.      Tenderness: There is no abdominal tenderness. There is no right CVA tenderness or left CVA tenderness.   Musculoskeletal:         General: Normal range of motion.   Skin:     General: Skin is warm.      Capillary Refill: Capillary refill takes less than 2 seconds.   Neurological:      General: No focal deficit present.      Mental Status: She is alert and oriented to person, place, and time.   Psychiatric:         Mood and Affect: Mood normal.         Behavior: Behavior normal.        Procedures     Procedures    Orders Placed This Encounter   Procedures    XR ABDOMEN (KUB) (SINGLE AP VIEW)    POCT Urine Dipstick    POCT Urinalysis no Micro    POC Pregnancy Urine Qual        Medications given during this emergency department visit:  Medications - No data to display    New Prescriptions    No medications on file        Past Medical History:   Diagnosis Date    Alcohol withdrawal syndrome with complication (HCC) 10/11/2017    Anxiety and depression     anxiety, depression    Back pain     problems with L4-5 vertebra    Common bile duct dilatation 6/12/2013    Depression     Gall bladder disease 6/12/2013    Psychiatric disorder     Vaginal candida 11/6/2017        Past Surgical History:   Procedure Laterality Date    CHOLECYSTECTOMY      HAND SURGERY Right 11/21/2022    RIGHT INDEX FINGER DEBRIDEMENT INCISION

## 2024-05-04 NOTE — ED TRIAGE NOTES
Pt c/o concerns of HIV. Pt states she's been tested 5 times but having paranoid delusion that someone is changing her results/information in the computer. Pt believes her skin is \"falling off\" from gastritis. Also thinks her organs are \"dropping\". Believes she has a uti due to having \"warm urine\". Hx schizoaffective disorder.

## 2024-07-30 ENCOUNTER — HOSPITAL ENCOUNTER (EMERGENCY)
Age: 44
Discharge: HOME | End: 2024-07-30
Payer: MEDICARE

## 2024-07-30 VITALS
SYSTOLIC BLOOD PRESSURE: 111 MMHG | TEMPERATURE: 98.4 F | WEIGHT: 120 LBS | HEART RATE: 88 BPM | RESPIRATION RATE: 17 BRPM | DIASTOLIC BLOOD PRESSURE: 68 MMHG | BODY MASS INDEX: 18.79 KG/M2 | OXYGEN SATURATION: 100 %

## 2024-07-30 DIAGNOSIS — L98.9 SKIN PROBLEM: ICD-10-CM

## 2024-07-30 DIAGNOSIS — N30.00 ACUTE CYSTITIS WITHOUT HEMATURIA: Primary | ICD-10-CM

## 2024-07-30 LAB
BACTERIA URNS QL MICRO: 0 /HPF
BILIRUB UR QL: NEGATIVE
CASTS URNS QL MICRO: 0 /LPF
CRYSTALS URNS QL MICRO: 0 /LPF
EPI CELLS #/AREA URNS HPF: NORMAL /HPF
GLUCOSE UR QL STRIP.AUTO: NEGATIVE MG/DL
HCG UR QL: NEGATIVE
KETONES UR-MCNC: NEGATIVE MG/DL
LEUKOCYTE ESTERASE UR QL STRIP: ABNORMAL
MUCOUS THREADS URNS QL MICRO: 0 /LPF
NITRITE UR QL: NEGATIVE
OTHER OBSERVATIONS: NORMAL
PH UR: 6.5 (ref 5–9)
PROT UR QL: NEGATIVE MG/DL
RBC # UR STRIP: ABNORMAL
RBC #/AREA URNS HPF: NORMAL /HPF
SERVICE CMNT-IMP: ABNORMAL
SERVICE CMNT-IMP: NORMAL
SP GR UR: 1.01 (ref 1–1.02)
UROBILINOGEN UR QL: 0.2 EU/DL (ref 0.2–1)
WBC URNS QL MICRO: NORMAL /HPF
WET PREP GENITAL: NORMAL
WET PREP GENITAL: NORMAL

## 2024-07-30 PROCEDURE — 81025 URINE PREGNANCY TEST: CPT

## 2024-07-30 PROCEDURE — 99283 EMERGENCY DEPT VISIT LOW MDM: CPT

## 2024-07-30 PROCEDURE — 81001 URINALYSIS AUTO W/SCOPE: CPT

## 2024-07-30 PROCEDURE — 87210 SMEAR WET MOUNT SALINE/INK: CPT

## 2024-07-30 PROCEDURE — 6370000000 HC RX 637 (ALT 250 FOR IP)

## 2024-07-30 PROCEDURE — 87591 N.GONORRHOEAE DNA AMP PROB: CPT

## 2024-07-30 PROCEDURE — 87491 CHLMYD TRACH DNA AMP PROBE: CPT

## 2024-07-30 RX ORDER — ONDANSETRON 4 MG/1
4 TABLET, FILM COATED ORAL 3 TIMES DAILY PRN
Qty: 15 TABLET | Refills: 0 | Status: SHIPPED | OUTPATIENT
Start: 2024-07-30

## 2024-07-30 RX ORDER — CEPHALEXIN 500 MG/1
500 CAPSULE ORAL
Status: COMPLETED | OUTPATIENT
Start: 2024-07-30 | End: 2024-07-30

## 2024-07-30 RX ORDER — CEPHALEXIN 500 MG/1
500 CAPSULE ORAL 2 TIMES DAILY
Qty: 14 CAPSULE | Refills: 0 | Status: SHIPPED | OUTPATIENT
Start: 2024-07-30 | End: 2024-08-06

## 2024-07-30 RX ORDER — IBUPROFEN 600 MG/1
600 TABLET ORAL EVERY 8 HOURS PRN
Qty: 15 TABLET | Refills: 0 | Status: SHIPPED | OUTPATIENT
Start: 2024-07-30 | End: 2024-08-04

## 2024-07-30 RX ORDER — ONDANSETRON 4 MG/1
4 TABLET, ORALLY DISINTEGRATING ORAL ONCE
Status: COMPLETED | OUTPATIENT
Start: 2024-07-30 | End: 2024-07-30

## 2024-07-30 RX ORDER — IBUPROFEN 800 MG/1
800 TABLET ORAL
Status: COMPLETED | OUTPATIENT
Start: 2024-07-30 | End: 2024-07-30

## 2024-07-30 RX ADMIN — ONDANSETRON 4 MG: 4 TABLET, ORALLY DISINTEGRATING ORAL at 21:00

## 2024-07-30 RX ADMIN — IBUPROFEN 800 MG: 800 TABLET ORAL at 21:00

## 2024-07-30 RX ADMIN — CEPHALEXIN 500 MG: 500 CAPSULE ORAL at 21:00

## 2024-07-30 ASSESSMENT — ENCOUNTER SYMPTOMS
CHEST TIGHTNESS: 0
ABDOMINAL PAIN: 0
CONSTIPATION: 0
SHORTNESS OF BREATH: 0
DIARRHEA: 0

## 2024-07-30 ASSESSMENT — LIFESTYLE VARIABLES
HOW OFTEN DO YOU HAVE A DRINK CONTAINING ALCOHOL: PATIENT DECLINED
HOW MANY STANDARD DRINKS CONTAINING ALCOHOL DO YOU HAVE ON A TYPICAL DAY: PATIENT DECLINED

## 2024-07-30 ASSESSMENT — PAIN SCALES - GENERAL: PAINLEVEL_OUTOF10: 5

## 2024-07-30 NOTE — ED TRIAGE NOTES
C/o wound to R foot after she fell when she was running from a dog. Also c/o foul smelling vaginal discharge. Pt also states she has concerns for HIV and has small hard \"knots\" on the skin of the bend of her arms and on her legs

## 2024-07-31 NOTE — ED NOTES
Called [facility] to transport patient home. Was told the patient needs to call Medicaid and update her address. [facility] was unable to provide a ride for this patient. Notified LPN of situation.      Ino Hay  07/30/24 2124       Ino Hay  07/30/24 2125

## 2024-07-31 NOTE — ED PROVIDER NOTES
pain and palpitations.   Gastrointestinal:  Negative for abdominal pain, constipation and diarrhea.   Genitourinary:  Positive for dysuria and vaginal discharge. Negative for vaginal bleeding and vaginal pain.   All other systems reviewed and are negative.       Physical Exam     Vitals signs and nursing note reviewed:  Vitals:    07/30/24 1914 07/30/24 1915   BP: 127/86    Pulse: 100    Resp:  20   Temp: 98.4 °F (36.9 °C)    SpO2: 100%    Weight:  54.4 kg (120 lb)      Physical Exam  Vitals and nursing note reviewed.   Constitutional:       Appearance: Normal appearance.   Cardiovascular:      Rate and Rhythm: Normal rate and regular rhythm.      Pulses: Normal pulses.      Heart sounds: Normal heart sounds.   Pulmonary:      Effort: Pulmonary effort is normal.      Breath sounds: Normal breath sounds.   Abdominal:      General: Abdomen is flat. There is no distension.      Palpations: Abdomen is soft.      Tenderness: There is no abdominal tenderness. There is no right CVA tenderness or left CVA tenderness.   Musculoskeletal:         General: Normal range of motion.   Skin:     General: Skin is warm.      Comments: Small abrasions present over right foot, no cellulitic changes, no evidence of abscess.   Neurological:      General: No focal deficit present.      Mental Status: She is alert and oriented to person, place, and time.   Psychiatric:         Mood and Affect: Mood normal.        Procedures     Procedures    Orders Placed This Encounter   Procedures    C.trachomatis N.gonorrhoeae DNA    Wet prep, genital    Urinalysis, Micro    POCT Urine Dipstick    POC PREGNANCY UR-QUAL    POC Pregnancy Urine Qual    POCT Urinalysis no Micro        Medications given during this emergency department visit:  Medications   ibuprofen (ADVIL;MOTRIN) tablet 800 mg (has no administration in time range)   ondansetron (ZOFRAN-ODT) disintegrating tablet 4 mg (has no administration in time range)   cephALEXin (KEFLEX) capsule 500

## 2024-07-31 NOTE — DISCHARGE INSTRUCTIONS
Start Keflex twice daily for the next 7 days.  Additionally please use Zofran as needed for nausea ibuprofen as needed for pain.  Follow-up with health department if you desire further STD testing.  Gonorrhea and Chlamydia testing was sent for you and the results will be available in your MyChart.

## 2024-07-31 NOTE — ED NOTES
Patient mobility status  with no difficulty. Provider aware     I have reviewed discharge instructions with the patient.  The patient verbalized understanding.    Patient left ED via Discharge Method: ambulatory to Home with  self .    Opportunity for questions and clarification provided.     Patient given 3 scripts.            Elvia Gaona LPN  07/30/24 210

## 2024-08-23 ENCOUNTER — HOSPITAL ENCOUNTER (EMERGENCY)
Age: 44
Discharge: LEFT BEFORE DISCHARGE | End: 2024-08-23
Attending: GENERAL PRACTICE
Payer: MEDICARE

## 2024-08-23 VITALS
WEIGHT: 120 LBS | TEMPERATURE: 97.7 F | DIASTOLIC BLOOD PRESSURE: 74 MMHG | BODY MASS INDEX: 18.83 KG/M2 | OXYGEN SATURATION: 100 % | HEART RATE: 74 BPM | RESPIRATION RATE: 15 BRPM | HEIGHT: 67 IN | SYSTOLIC BLOOD PRESSURE: 106 MMHG

## 2024-08-23 LAB
ALBUMIN SERPL-MCNC: 4 G/DL (ref 3.5–5)
ALBUMIN/GLOB SERPL: 1.2 (ref 1–1.9)
ALP SERPL-CCNC: 68 U/L (ref 35–104)
ALT SERPL-CCNC: 11 U/L (ref 12–65)
ANION GAP SERPL CALC-SCNC: 9 MMOL/L (ref 9–18)
APPEARANCE UR: NORMAL
AST SERPL-CCNC: 20 U/L (ref 15–37)
BASOPHILS # BLD: 0 K/UL (ref 0–0.2)
BASOPHILS NFR BLD: 1 % (ref 0–2)
BILIRUB SERPL-MCNC: 0.3 MG/DL (ref 0–1.2)
BILIRUB UR QL: NEGATIVE
BUN SERPL-MCNC: 17 MG/DL (ref 6–23)
CALCIUM SERPL-MCNC: 8.8 MG/DL (ref 8.8–10.2)
CHLORIDE SERPL-SCNC: 103 MMOL/L (ref 98–107)
CO2 SERPL-SCNC: 25 MMOL/L (ref 20–28)
COLOR UR: YELLOW
CREAT SERPL-MCNC: 0.87 MG/DL (ref 0.6–1.1)
DIFFERENTIAL METHOD BLD: ABNORMAL
EOSINOPHIL # BLD: 0.1 K/UL (ref 0–0.8)
EOSINOPHIL NFR BLD: 1 % (ref 0.5–7.8)
ERYTHROCYTE [DISTWIDTH] IN BLOOD BY AUTOMATED COUNT: 14.8 % (ref 11.9–14.6)
GLOBULIN SER CALC-MCNC: 3.3 G/DL (ref 2.3–3.5)
GLUCOSE SERPL-MCNC: 86 MG/DL (ref 70–99)
GLUCOSE UR STRIP.AUTO-MCNC: NEGATIVE MG/DL
HCT VFR BLD AUTO: 40.8 % (ref 35.8–46.3)
HGB BLD-MCNC: 12.8 G/DL (ref 11.7–15.4)
HGB UR QL STRIP: NEGATIVE
IMM GRANULOCYTES # BLD AUTO: 0 K/UL (ref 0–0.5)
IMM GRANULOCYTES NFR BLD AUTO: 0 % (ref 0–5)
KETONES UR QL STRIP.AUTO: NEGATIVE MG/DL
LEUKOCYTE ESTERASE UR QL STRIP.AUTO: NEGATIVE
LIPASE SERPL-CCNC: 37 U/L (ref 13–60)
LYMPHOCYTES # BLD: 1.5 K/UL (ref 0.5–4.6)
LYMPHOCYTES NFR BLD: 28 % (ref 13–44)
MCH RBC QN AUTO: 27.9 PG (ref 26.1–32.9)
MCHC RBC AUTO-ENTMCNC: 31.4 G/DL (ref 31.4–35)
MCV RBC AUTO: 89.1 FL (ref 82–102)
MONOCYTES # BLD: 0.6 K/UL (ref 0.1–1.3)
MONOCYTES NFR BLD: 10 % (ref 4–12)
NEUTS SEG # BLD: 3.3 K/UL (ref 1.7–8.2)
NEUTS SEG NFR BLD: 60 % (ref 43–78)
NITRITE UR QL STRIP.AUTO: NEGATIVE
NRBC # BLD: 0 K/UL (ref 0–0.2)
PH UR STRIP: 6 (ref 5–9)
PLATELET # BLD AUTO: 255 K/UL (ref 150–450)
PMV BLD AUTO: 9 FL (ref 9.4–12.3)
POTASSIUM SERPL-SCNC: 3.9 MMOL/L (ref 3.5–5.1)
PROT SERPL-MCNC: 7.3 G/DL (ref 6.3–8.2)
PROT UR STRIP-MCNC: NEGATIVE MG/DL
RBC # BLD AUTO: 4.58 M/UL (ref 4.05–5.2)
SODIUM SERPL-SCNC: 137 MMOL/L (ref 136–145)
SP GR UR REFRACTOMETRY: 1.01 (ref 1–1.02)
UROBILINOGEN UR QL STRIP.AUTO: 0.2 EU/DL (ref 0.2–1)
WBC # BLD AUTO: 5.6 K/UL (ref 4.3–11.1)

## 2024-08-23 PROCEDURE — 85025 COMPLETE CBC W/AUTO DIFF WBC: CPT

## 2024-08-23 PROCEDURE — 4500000002 HC ER NO CHARGE

## 2024-08-23 PROCEDURE — 80053 COMPREHEN METABOLIC PANEL: CPT

## 2024-08-23 PROCEDURE — 81003 URINALYSIS AUTO W/O SCOPE: CPT

## 2024-08-23 PROCEDURE — 83690 ASSAY OF LIPASE: CPT

## 2024-08-23 NOTE — ED TRIAGE NOTES
Pt arrives to the ER with c/o cold chills, fever, nausea, abdominal pain, pain/difficulty with urinating x unknown multiple weeks.

## 2024-09-14 ENCOUNTER — HOSPITAL ENCOUNTER (EMERGENCY)
Age: 44
Discharge: HOME | End: 2024-09-15
Attending: EMERGENCY MEDICINE
Payer: MEDICARE

## 2024-09-14 DIAGNOSIS — K59.00 CONSTIPATION, UNSPECIFIED CONSTIPATION TYPE: ICD-10-CM

## 2024-09-14 DIAGNOSIS — R10.9 ACUTE ABDOMINAL PAIN: Primary | ICD-10-CM

## 2024-09-14 DIAGNOSIS — N93.9 ABNORMAL UTERINE BLEEDING: ICD-10-CM

## 2024-09-14 LAB
ALBUMIN SERPL-MCNC: 3.7 G/DL (ref 3.5–5)
ALBUMIN/GLOB SERPL: 1.2 (ref 1–1.9)
ALP SERPL-CCNC: 82 U/L (ref 35–104)
ALT SERPL-CCNC: 32 U/L (ref 12–65)
ANION GAP SERPL CALC-SCNC: 8 MMOL/L (ref 9–18)
AST SERPL-CCNC: 39 U/L (ref 15–37)
BASOPHILS # BLD: 0 K/UL (ref 0–0.2)
BASOPHILS NFR BLD: 0 % (ref 0–2)
BILIRUB SERPL-MCNC: 0.7 MG/DL (ref 0–1.2)
BUN SERPL-MCNC: 12 MG/DL (ref 6–23)
CALCIUM SERPL-MCNC: 8.9 MG/DL (ref 8.8–10.2)
CHLORIDE SERPL-SCNC: 104 MMOL/L (ref 98–107)
CO2 SERPL-SCNC: 28 MMOL/L (ref 20–28)
CREAT SERPL-MCNC: 0.87 MG/DL (ref 0.6–1.1)
DIFFERENTIAL METHOD BLD: NORMAL
EOSINOPHIL # BLD: 0.1 K/UL (ref 0–0.8)
EOSINOPHIL NFR BLD: 2 % (ref 0.5–7.8)
ERYTHROCYTE [DISTWIDTH] IN BLOOD BY AUTOMATED COUNT: 14.1 % (ref 11.9–14.6)
GLOBULIN SER CALC-MCNC: 3.1 G/DL (ref 2.3–3.5)
GLUCOSE SERPL-MCNC: 89 MG/DL (ref 70–99)
HCG UR QL: NEGATIVE
HCT VFR BLD AUTO: 37 % (ref 35.8–46.3)
HGB BLD-MCNC: 11.8 G/DL (ref 11.7–15.4)
IMM GRANULOCYTES # BLD AUTO: 0 K/UL (ref 0–0.5)
IMM GRANULOCYTES NFR BLD AUTO: 0 % (ref 0–5)
LIPASE SERPL-CCNC: 68 U/L (ref 13–60)
LYMPHOCYTES # BLD: 1.8 K/UL (ref 0.5–4.6)
LYMPHOCYTES NFR BLD: 34 % (ref 13–44)
MCH RBC QN AUTO: 28.6 PG (ref 26.1–32.9)
MCHC RBC AUTO-ENTMCNC: 31.9 G/DL (ref 31.4–35)
MCV RBC AUTO: 89.6 FL (ref 82–102)
MONOCYTES # BLD: 0.5 K/UL (ref 0.1–1.3)
MONOCYTES NFR BLD: 10 % (ref 4–12)
NEUTS SEG # BLD: 2.8 K/UL (ref 1.7–8.2)
NEUTS SEG NFR BLD: 54 % (ref 43–78)
NRBC # BLD: 0 K/UL (ref 0–0.2)
PLATELET # BLD AUTO: 251 K/UL (ref 150–450)
PMV BLD AUTO: 9.4 FL (ref 9.4–12.3)
POTASSIUM SERPL-SCNC: 4.1 MMOL/L (ref 3.5–5.1)
PROT SERPL-MCNC: 6.8 G/DL (ref 6.3–8.2)
RBC # BLD AUTO: 4.13 M/UL (ref 4.05–5.2)
SODIUM SERPL-SCNC: 140 MMOL/L (ref 136–145)
WBC # BLD AUTO: 5.3 K/UL (ref 4.3–11.1)

## 2024-09-14 PROCEDURE — 96374 THER/PROPH/DIAG INJ IV PUSH: CPT

## 2024-09-14 PROCEDURE — 80053 COMPREHEN METABOLIC PANEL: CPT

## 2024-09-14 PROCEDURE — 83690 ASSAY OF LIPASE: CPT

## 2024-09-14 PROCEDURE — 81003 URINALYSIS AUTO W/O SCOPE: CPT

## 2024-09-14 PROCEDURE — 81025 URINE PREGNANCY TEST: CPT

## 2024-09-14 PROCEDURE — 85025 COMPLETE CBC W/AUTO DIFF WBC: CPT

## 2024-09-14 PROCEDURE — 96361 HYDRATE IV INFUSION ADD-ON: CPT

## 2024-09-14 PROCEDURE — 93005 ELECTROCARDIOGRAM TRACING: CPT | Performed by: EMERGENCY MEDICINE

## 2024-09-14 PROCEDURE — 96375 TX/PRO/DX INJ NEW DRUG ADDON: CPT

## 2024-09-14 PROCEDURE — 81001 URINALYSIS AUTO W/SCOPE: CPT

## 2024-09-14 PROCEDURE — 99285 EMERGENCY DEPT VISIT HI MDM: CPT

## 2024-09-14 RX ORDER — KETOROLAC TROMETHAMINE 15 MG/ML
15 INJECTION, SOLUTION INTRAMUSCULAR; INTRAVENOUS
Status: COMPLETED | OUTPATIENT
Start: 2024-09-15 | End: 2024-09-15

## 2024-09-14 RX ORDER — ONDANSETRON 2 MG/ML
4 INJECTION INTRAMUSCULAR; INTRAVENOUS
Status: COMPLETED | OUTPATIENT
Start: 2024-09-15 | End: 2024-09-15

## 2024-09-14 ASSESSMENT — PAIN - FUNCTIONAL ASSESSMENT: PAIN_FUNCTIONAL_ASSESSMENT: 0-10

## 2024-09-14 ASSESSMENT — PAIN DESCRIPTION - LOCATION: LOCATION: ABDOMEN;BACK

## 2024-09-14 ASSESSMENT — PAIN DESCRIPTION - ORIENTATION: ORIENTATION: LOWER

## 2024-09-14 ASSESSMENT — PAIN SCALES - GENERAL: PAINLEVEL_OUTOF10: 10

## 2024-09-15 ENCOUNTER — APPOINTMENT (OUTPATIENT)
Dept: CT IMAGING | Age: 44
End: 2024-09-15
Payer: MEDICARE

## 2024-09-15 VITALS
HEART RATE: 80 BPM | RESPIRATION RATE: 20 BRPM | TEMPERATURE: 98.3 F | DIASTOLIC BLOOD PRESSURE: 82 MMHG | BODY MASS INDEX: 18.83 KG/M2 | HEIGHT: 67 IN | WEIGHT: 120 LBS | OXYGEN SATURATION: 100 % | SYSTOLIC BLOOD PRESSURE: 126 MMHG

## 2024-09-15 LAB
APPEARANCE UR: CLEAR
BACTERIA URNS QL MICRO: NEGATIVE /HPF
BILIRUB UR QL: NEGATIVE
COLOR UR: ABNORMAL
EKG ATRIAL RATE: 54 BPM
EKG DIAGNOSIS: NORMAL
EKG P AXIS: 78 DEGREES
EKG P-R INTERVAL: 150 MS
EKG Q-T INTERVAL: 408 MS
EKG QRS DURATION: 96 MS
EKG QTC CALCULATION (BAZETT): 386 MS
EKG R AXIS: 65 DEGREES
EKG T AXIS: 59 DEGREES
EKG VENTRICULAR RATE: 54 BPM
EPI CELLS #/AREA URNS HPF: ABNORMAL /HPF
GLUCOSE UR STRIP.AUTO-MCNC: NEGATIVE MG/DL
HGB UR QL STRIP: ABNORMAL
HYALINE CASTS URNS QL MICRO: ABNORMAL /LPF
KETONES UR QL STRIP.AUTO: NEGATIVE MG/DL
LEUKOCYTE ESTERASE UR QL STRIP.AUTO: NEGATIVE
NITRITE UR QL STRIP.AUTO: NEGATIVE
PH UR STRIP: 6.5 (ref 5–9)
PROT UR STRIP-MCNC: NEGATIVE MG/DL
RBC #/AREA URNS HPF: ABNORMAL /HPF
SP GR UR REFRACTOMETRY: 1.02 (ref 1–1.02)
UROBILINOGEN UR QL STRIP.AUTO: 1 EU/DL (ref 0.2–1)
WBC URNS QL MICRO: ABNORMAL /HPF

## 2024-09-15 PROCEDURE — 96375 TX/PRO/DX INJ NEW DRUG ADDON: CPT

## 2024-09-15 PROCEDURE — 96374 THER/PROPH/DIAG INJ IV PUSH: CPT

## 2024-09-15 PROCEDURE — 2580000003 HC RX 258: Performed by: EMERGENCY MEDICINE

## 2024-09-15 PROCEDURE — 6360000002 HC RX W HCPCS: Performed by: EMERGENCY MEDICINE

## 2024-09-15 PROCEDURE — 6360000004 HC RX CONTRAST MEDICATION: Performed by: EMERGENCY MEDICINE

## 2024-09-15 PROCEDURE — 96361 HYDRATE IV INFUSION ADD-ON: CPT

## 2024-09-15 PROCEDURE — 93010 ELECTROCARDIOGRAM REPORT: CPT | Performed by: INTERNAL MEDICINE

## 2024-09-15 PROCEDURE — 74177 CT ABD & PELVIS W/CONTRAST: CPT

## 2024-09-15 RX ORDER — BISACODYL 10 MG
10 SUPPOSITORY, RECTAL RECTAL DAILY PRN
Qty: 12 SUPPOSITORY | Refills: 0 | Status: SHIPPED | OUTPATIENT
Start: 2024-09-15 | End: 2024-10-15

## 2024-09-15 RX ORDER — ONDANSETRON 4 MG/1
4 TABLET, ORALLY DISINTEGRATING ORAL 3 TIMES DAILY PRN
Qty: 21 TABLET | Refills: 0 | Status: SHIPPED | OUTPATIENT
Start: 2024-09-15

## 2024-09-15 RX ORDER — 0.9 % SODIUM CHLORIDE 0.9 %
1000 INTRAVENOUS SOLUTION INTRAVENOUS
Status: COMPLETED | OUTPATIENT
Start: 2024-09-15 | End: 2024-09-15

## 2024-09-15 RX ORDER — IOPAMIDOL 755 MG/ML
100 INJECTION, SOLUTION INTRAVASCULAR
Status: COMPLETED | OUTPATIENT
Start: 2024-09-15 | End: 2024-09-15

## 2024-09-15 RX ORDER — POLYETHYLENE GLYCOL 3350 17 G/17G
17 POWDER, FOR SOLUTION ORAL 2 TIMES DAILY PRN
Qty: 510 G | Refills: 0 | Status: SHIPPED | OUTPATIENT
Start: 2024-09-15 | End: 2024-10-15

## 2024-09-15 RX ORDER — TRAMADOL HYDROCHLORIDE 50 MG/1
50 TABLET ORAL EVERY 6 HOURS PRN
Qty: 12 TABLET | Refills: 0 | Status: SHIPPED | OUTPATIENT
Start: 2024-09-15 | End: 2024-09-18

## 2024-09-15 RX ADMIN — ONDANSETRON 4 MG: 2 INJECTION INTRAMUSCULAR; INTRAVENOUS at 00:43

## 2024-09-15 RX ADMIN — SODIUM CHLORIDE 1000 ML: 9 INJECTION, SOLUTION INTRAVENOUS at 00:42

## 2024-09-15 RX ADMIN — KETOROLAC TROMETHAMINE 15 MG: 15 INJECTION, SOLUTION INTRAMUSCULAR; INTRAVENOUS at 00:43

## 2024-09-15 RX ADMIN — IOPAMIDOL 100 ML: 755 INJECTION, SOLUTION INTRAVENOUS at 02:12

## 2024-09-15 ASSESSMENT — PAIN DESCRIPTION - LOCATION: LOCATION: ABDOMEN

## 2024-09-15 ASSESSMENT — PAIN DESCRIPTION - DESCRIPTORS: DESCRIPTORS: ACHING

## 2024-09-15 ASSESSMENT — PAIN SCALES - GENERAL: PAINLEVEL_OUTOF10: 10

## 2024-09-20 ENCOUNTER — HOSPITAL ENCOUNTER (EMERGENCY)
Age: 44
Discharge: HOME | End: 2024-09-21

## 2024-09-20 VITALS
BODY MASS INDEX: 18.83 KG/M2 | OXYGEN SATURATION: 95 % | WEIGHT: 120 LBS | TEMPERATURE: 97.3 F | HEIGHT: 67 IN | RESPIRATION RATE: 17 BRPM | SYSTOLIC BLOOD PRESSURE: 92 MMHG | DIASTOLIC BLOOD PRESSURE: 76 MMHG | HEART RATE: 73 BPM

## 2024-09-20 ASSESSMENT — PAIN - FUNCTIONAL ASSESSMENT: PAIN_FUNCTIONAL_ASSESSMENT: NONE - DENIES PAIN

## 2024-09-23 ENCOUNTER — HOSPITAL ENCOUNTER (EMERGENCY)
Age: 44
Discharge: LEFT BEFORE DISCHARGE | End: 2024-09-23

## 2024-09-23 VITALS
HEART RATE: 87 BPM | OXYGEN SATURATION: 100 % | SYSTOLIC BLOOD PRESSURE: 120 MMHG | RESPIRATION RATE: 16 BRPM | WEIGHT: 110 LBS | TEMPERATURE: 98.2 F | HEIGHT: 67 IN | BODY MASS INDEX: 17.27 KG/M2 | DIASTOLIC BLOOD PRESSURE: 84 MMHG

## 2024-09-23 ASSESSMENT — PAIN - FUNCTIONAL ASSESSMENT: PAIN_FUNCTIONAL_ASSESSMENT: 0-10

## 2024-09-23 ASSESSMENT — PAIN DESCRIPTION - LOCATION: LOCATION: ABDOMEN

## 2024-09-23 ASSESSMENT — PAIN SCALES - GENERAL: PAINLEVEL_OUTOF10: 10

## 2024-09-30 ENCOUNTER — HOSPITAL ENCOUNTER (EMERGENCY)
Age: 44
Discharge: HOME | End: 2024-09-30

## 2024-10-02 ENCOUNTER — HOSPITAL ENCOUNTER (EMERGENCY)
Age: 44
Discharge: HOME | End: 2024-10-04
Payer: MEDICARE

## 2024-10-02 DIAGNOSIS — M79.671 RIGHT FOOT PAIN: Primary | ICD-10-CM

## 2024-10-02 DIAGNOSIS — Z76.0 ENCOUNTER FOR MEDICATION REFILL: ICD-10-CM

## 2024-10-02 PROCEDURE — 99282 EMERGENCY DEPT VISIT SF MDM: CPT

## 2024-10-02 PROCEDURE — 99281 EMR DPT VST MAYX REQ PHY/QHP: CPT

## 2024-10-17 NOTE — ED PROVIDER NOTES
Take 2 capsules by mouth 3 times daily.Historical Med      mupirocin (BACTROBAN) 2 % ointment Apply 45 g topically, Topical, Starting Fri 11/10/2023, Historical Med      !! naproxen (NAPROSYN) 500 MG tablet Take 1 tablet by mouth in the morning and 1 tablet in the evening.Historical Med      OLANZapine (ZYPREXA) 5 MG tablet 1 tabletHistorical Med      potassium chloride (KLOR-CON) 10 MEQ extended release tablet Take 1 tablet by mouth every morningHistorical Med      risperiDONE (RISPERDAL) 1 MG tablet Take 1 tablet by mouth 2 times dailyHistorical Med      STIMULANT LAXATIVE 8.6-50 MG per tablet 8.6-50 tablets, DAWHistorical Med      ziprasidone (GEODON) 80 MG capsule Take 1 capsule by mouth 2 times daily (with meals)Historical Med      !! busPIRone (BUSPAR) 10 MG tablet 1 tabletHistorical Med      dicyclomine (BENTYL) 20 MG tablet 1 tabletHistorical Med      !! naproxen (NAPROSYN) 500 MG tablet Take 1 tablet by mouth 2 times daily (with meals), Disp-60 tablet, R-3Normal      dicyclomine (BENTYL) 10 MG capsule Take 1 capsule by mouth 3 times daily as needed (Cramping), Disp-15 capsule, R-0Print      benztropine (COGENTIN) 0.5 MG tablet Take 1 tablet by mouth 2 times daily, Disp-28 tablet, R-0Print      !! busPIRone (BUSPAR) 15 MG tablet Take 15 mg by mouth daily, Disp-14 tablet, R-0Print      !! QUEtiapine (SEROQUEL) 100 MG tablet Take 200 mg by mouth 2 times dailyHistorical Med      cyproheptadine (PERIACTIN) 4 MG tablet Take 4 mg by mouth in the morning and at bedtimeHistorical Med      !! divalproex (DEPAKOTE) 500 MG DR tablet Take 500 mg by mouth in the morning and at bedtimeHistorical Med      !! prazosin (MINIPRESS) 2 MG capsule Take 2 mg by mouth nightlyHistorical Med      pantoprazole (PROTONIX) 40 MG tablet Take 1 tablet by mouth every morning (before breakfast), Disp-30 tablet, R-3Print      !! QUEtiapine (SEROQUEL) 100 MG tablet Take 1 tablet by mouth daily, Disp-60 tablet, R-3Print      !! QUEtiapine

## 2024-10-19 ENCOUNTER — HOSPITAL ENCOUNTER (EMERGENCY)
Age: 44
Discharge: HOME | End: 2024-10-19
Payer: MEDICARE

## 2024-10-19 VITALS
RESPIRATION RATE: 16 BRPM | TEMPERATURE: 98.3 F | OXYGEN SATURATION: 95 % | HEIGHT: 67 IN | BODY MASS INDEX: 17.27 KG/M2 | DIASTOLIC BLOOD PRESSURE: 74 MMHG | HEART RATE: 99 BPM | WEIGHT: 110 LBS | SYSTOLIC BLOOD PRESSURE: 102 MMHG

## 2024-10-19 DIAGNOSIS — R52 TOTAL BODY PAIN: Primary | ICD-10-CM

## 2024-10-19 PROCEDURE — 6370000000 HC RX 637 (ALT 250 FOR IP)

## 2024-10-19 PROCEDURE — 99283 EMERGENCY DEPT VISIT LOW MDM: CPT

## 2024-10-19 RX ORDER — ACETAMINOPHEN 500 MG
1000 TABLET ORAL ONCE
Status: COMPLETED | OUTPATIENT
Start: 2024-10-19 | End: 2024-10-19

## 2024-10-19 RX ADMIN — ACETAMINOPHEN 1000 MG: 500 TABLET, FILM COATED ORAL at 04:38

## 2024-10-19 ASSESSMENT — ENCOUNTER SYMPTOMS
CHEST TIGHTNESS: 0
ABDOMINAL PAIN: 0
DIARRHEA: 0
SHORTNESS OF BREATH: 0
CONSTIPATION: 0
VOMITING: 0

## 2024-10-19 ASSESSMENT — PAIN - FUNCTIONAL ASSESSMENT
PAIN_FUNCTIONAL_ASSESSMENT: 0-10
PAIN_FUNCTIONAL_ASSESSMENT: 0-10

## 2024-10-19 ASSESSMENT — PAIN DESCRIPTION - DESCRIPTORS: DESCRIPTORS: ACHING

## 2024-10-19 ASSESSMENT — PAIN DESCRIPTION - LOCATION
LOCATION: BACK;NECK;LEG
LOCATION: BACK

## 2024-10-19 ASSESSMENT — PAIN SCALES - GENERAL
PAINLEVEL_OUTOF10: 10

## 2024-10-19 ASSESSMENT — LIFESTYLE VARIABLES
HOW OFTEN DO YOU HAVE A DRINK CONTAINING ALCOHOL: PATIENT UNABLE TO ANSWER
HOW MANY STANDARD DRINKS CONTAINING ALCOHOL DO YOU HAVE ON A TYPICAL DAY: PATIENT DECLINED

## 2024-10-19 NOTE — ED PROVIDER NOTES
BEDTIME    DIVALPROEX (DEPAKOTE) 500 MG DR TABLET    Take 500 mg by mouth in the morning and at bedtime    ESCITALOPRAM (LEXAPRO) 10 MG TABLET    Take 1 tablet by mouth daily    GABAPENTIN (NEURONTIN) 300 MG CAPSULE    Take 2 capsules by mouth 3 times daily.    IBUPROFEN (ADVIL;MOTRIN) 600 MG TABLET    Take 1 tablet by mouth every 8 hours as needed for Pain    MAGNESIUM CITRATE (CITROMA) SOLN    Take 296 mLs by mouth daily as needed    MELOXICAM (MOBIC) 7.5 MG TABLET    Take 1 tablet by mouth daily    MIRTAZAPINE (REMERON) 15 MG TABLET    Take 15 mg by mouth    MUPIROCIN (BACTROBAN) 2 % OINTMENT    Apply 45 g topically    NAPROXEN (NAPROSYN) 500 MG TABLET    Take 1 tablet by mouth in the morning and 1 tablet in the evening.    NAPROXEN (NAPROSYN) 500 MG TABLET    Take 1 tablet by mouth 2 times daily (with meals)    OLANZAPINE (ZYPREXA) 5 MG TABLET    1 tablet    ONDANSETRON (ZOFRAN) 4 MG TABLET    Take 1 tablet by mouth 3 times daily as needed for Nausea or Vomiting    ONDANSETRON (ZOFRAN-ODT) 4 MG DISINTEGRATING TABLET    Take 1 tablet by mouth 3 times daily as needed for Nausea or Vomiting    PANTOPRAZOLE (PROTONIX) 40 MG TABLET    Take 1 tablet by mouth every morning (before breakfast)    POTASSIUM CHLORIDE (KLOR-CON) 10 MEQ EXTENDED RELEASE TABLET    Take 1 tablet by mouth every morning    PRAZOSIN (MINIPRESS) 2 MG CAPSULE    TAKE 1 CAPSULE BY MOUTH AT BEDTIME    PRAZOSIN (MINIPRESS) 2 MG CAPSULE    Take 2 mg by mouth nightly    QUETIAPINE (SEROQUEL) 100 MG TABLET    Take 1 tablet by mouth daily    QUETIAPINE (SEROQUEL) 100 MG TABLET    Take 1 tablet by mouth 2 times daily    QUETIAPINE (SEROQUEL) 100 MG TABLET    Take 200 mg by mouth 2 times daily    RISPERIDONE (RISPERDAL) 1 MG TABLET    Take 1 tablet by mouth 2 times daily    SENNA (SENOKOT) 8.6 MG TABLET    Take 1 tablet by mouth 2 times daily    STIMULANT LAXATIVE 8.6-50 MG PER TABLET    8.6-50 tablets    TRAZODONE (DESYREL) 50 MG TABLET    Take 50 mg by  mouth    ZIPRASIDONE (GEODON) 80 MG CAPSULE    Take 1 capsule by mouth 2 times daily (with meals)        No results found for any visits on 10/19/24.      No orders to display                No results for input(s): \"COVID19\" in the last 72 hours.    Voice dictation software was used during the making of this note.  This software is not perfect and grammatical and other typographical errors may be present.  This note has not been completely proofread for errors.      Tania Cerrato, APRN - CNP  10/19/24 0402       Tania Cerrato, APRN - CNP  10/19/24 0406
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
## 2024-10-22 ENCOUNTER — HOSPITAL ENCOUNTER (EMERGENCY)
Age: 44
Discharge: HOME | End: 2024-10-22
Attending: EMERGENCY MEDICINE
Payer: MEDICARE

## 2024-10-22 VITALS
WEIGHT: 110 LBS | RESPIRATION RATE: 21 BRPM | DIASTOLIC BLOOD PRESSURE: 76 MMHG | TEMPERATURE: 97.8 F | SYSTOLIC BLOOD PRESSURE: 114 MMHG | OXYGEN SATURATION: 98 % | BODY MASS INDEX: 17.27 KG/M2 | HEART RATE: 77 BPM | HEIGHT: 67 IN

## 2024-10-22 DIAGNOSIS — R52 PAIN: Primary | ICD-10-CM

## 2024-10-22 DIAGNOSIS — F19.10 DRUG ABUSE (HCC): ICD-10-CM

## 2024-10-22 PROCEDURE — 99282 EMERGENCY DEPT VISIT SF MDM: CPT

## 2024-10-22 ASSESSMENT — PAIN - FUNCTIONAL ASSESSMENT: PAIN_FUNCTIONAL_ASSESSMENT: 0-10

## 2024-10-22 ASSESSMENT — PAIN SCALES - GENERAL: PAINLEVEL_OUTOF10: 10

## 2024-10-23 NOTE — DISCHARGE INSTRUCTIONS
Please follow up with your primary care physician this week.  If you have uncontrollable vomiting, difficulty breathing, or any other concerning symptoms, please return to the ER immediately.

## 2024-10-23 NOTE — ED PROVIDER NOTES
11/6/2017        Past Surgical History:   Procedure Laterality Date    CHOLECYSTECTOMY      HAND SURGERY Right 11/21/2022    RIGHT INDEX FINGER DEBRIDEMENT INCISION AND DRAINAGE performed by Osvaldo Jauregui MD at [facility] MAIN OR    HAND SURGERY Right 11/23/2022    Right idnex finger irrigation and debridment performed by Osvaldo Jauregui MD at [facility] MAIN OR    ORTHOPEDIC SURGERY  2007    left foot, tonail excision        Social History     Socioeconomic History    Marital status: Single     Spouse name: None    Number of children: None    Years of education: None    Highest education level: None   Tobacco Use    Smoking status: Former     Types: Cigarettes     Passive exposure: Never    Smokeless tobacco: Never   Vaping Use    Vaping status: Some Days    Substances: Nicotine    Devices: Disposable    Passive vaping exposure: Yes   Substance and Sexual Activity    Alcohol use: Not Currently    Drug use: Not Currently     Types: Prescription    Sexual activity: Yes     Birth control/protection: None   Social History Narrative    ** Merged History Encounter **          Social Determinants of Health     Financial Resource Strain: High Risk (10/26/2023)    Received from [employer]    Financial Resource Strain     Difficulty Paying Living Expenses: Very hard     Difficulty Paying Medical Expenses: Yes   Food Insecurity: Food Insecurity Present (10/26/2023)    Received from [employer] [facility]    Food Insecurity     Worried about Running Out of Food in the Last Year: Often true     Ran Out of Food in the Last Year: Often true   Transportation Needs: Unmet Transportation Needs (10/26/2023)    Received from [employer] [facility]    Transportation Needs     Lack of Transportation: Yes   Stress: Stress Concern Present (10/26/2023)    Received from [facility]    Stress     Feeling of Stress : Very much   Social Connections: Socially Isolated (10/26/2023)    Received from [facility]
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
## 2024-10-23 NOTE — ED NOTES
Pt refused to come into triage room for assessment and discharge. Preceded to curse and call this RN names. Pt left the department at this time.      Vani Carter, SEDRICK  10/22/24 9540

## 2024-11-07 ENCOUNTER — HOSPITAL ENCOUNTER (EMERGENCY)
Age: 44
Discharge: HOME | End: 2024-11-07
Attending: EMERGENCY MEDICINE
Payer: MEDICARE

## 2024-11-07 ENCOUNTER — APPOINTMENT (OUTPATIENT)
Dept: GENERAL RADIOLOGY | Age: 44
End: 2024-11-07
Payer: MEDICARE

## 2024-11-07 VITALS
WEIGHT: 110 LBS | OXYGEN SATURATION: 99 % | TEMPERATURE: 97.6 F | HEIGHT: 67 IN | SYSTOLIC BLOOD PRESSURE: 105 MMHG | BODY MASS INDEX: 17.27 KG/M2 | HEART RATE: 90 BPM | DIASTOLIC BLOOD PRESSURE: 70 MMHG | RESPIRATION RATE: 20 BRPM

## 2024-11-07 DIAGNOSIS — R05.1 ACUTE COUGH: Primary | ICD-10-CM

## 2024-11-07 LAB
ALBUMIN SERPL-MCNC: 4 G/DL (ref 3.5–5)
ALBUMIN/GLOB SERPL: 1.4 (ref 1–1.9)
ALP SERPL-CCNC: 52 U/L (ref 35–104)
ALT SERPL-CCNC: 36 U/L (ref 8–45)
ANION GAP SERPL CALC-SCNC: 9 MMOL/L (ref 7–16)
AST SERPL-CCNC: 35 U/L (ref 15–37)
BASOPHILS # BLD: 0 K/UL (ref 0–0.2)
BASOPHILS NFR BLD: 1 % (ref 0–2)
BILIRUB SERPL-MCNC: 1 MG/DL (ref 0–1.2)
BUN SERPL-MCNC: 8 MG/DL (ref 6–23)
CALCIUM SERPL-MCNC: 9.4 MG/DL (ref 8.8–10.2)
CHLORIDE SERPL-SCNC: 104 MMOL/L (ref 98–107)
CO2 SERPL-SCNC: 27 MMOL/L (ref 20–29)
CREAT SERPL-MCNC: 0.85 MG/DL (ref 0.6–1.1)
DIFFERENTIAL METHOD BLD: ABNORMAL
EOSINOPHIL # BLD: 0 K/UL (ref 0–0.8)
EOSINOPHIL NFR BLD: 1 % (ref 0.5–7.8)
ERYTHROCYTE [DISTWIDTH] IN BLOOD BY AUTOMATED COUNT: 13.5 % (ref 11.9–14.6)
GLOBULIN SER CALC-MCNC: 2.8 G/DL (ref 2.3–3.5)
GLUCOSE SERPL-MCNC: 86 MG/DL (ref 70–99)
HCT VFR BLD AUTO: 39.2 % (ref 35.8–46.3)
HGB BLD-MCNC: 12.4 G/DL (ref 11.7–15.4)
IMM GRANULOCYTES # BLD AUTO: 0 K/UL (ref 0–0.5)
IMM GRANULOCYTES NFR BLD AUTO: 1 % (ref 0–5)
LIPASE SERPL-CCNC: 16 U/L (ref 13–60)
LYMPHOCYTES # BLD: 1.5 K/UL (ref 0.5–4.6)
LYMPHOCYTES NFR BLD: 41 % (ref 13–44)
MCH RBC QN AUTO: 28.1 PG (ref 26.1–32.9)
MCHC RBC AUTO-ENTMCNC: 31.6 G/DL (ref 31.4–35)
MCV RBC AUTO: 88.7 FL (ref 82–102)
MONOCYTES # BLD: 0.4 K/UL (ref 0.1–1.3)
MONOCYTES NFR BLD: 10 % (ref 4–12)
NEUTS SEG # BLD: 1.7 K/UL (ref 1.7–8.2)
NEUTS SEG NFR BLD: 46 % (ref 43–78)
NRBC # BLD: 0 K/UL (ref 0–0.2)
PLATELET # BLD AUTO: 182 K/UL (ref 150–450)
PMV BLD AUTO: 10.1 FL (ref 9.4–12.3)
POTASSIUM SERPL-SCNC: 4.7 MMOL/L (ref 3.5–5.1)
PROT SERPL-MCNC: 6.8 G/DL (ref 6.3–8.2)
RBC # BLD AUTO: 4.42 M/UL (ref 4.05–5.2)
SODIUM SERPL-SCNC: 139 MMOL/L (ref 136–145)
WBC # BLD AUTO: 3.6 K/UL (ref 4.3–11.1)

## 2024-11-07 PROCEDURE — 6370000000 HC RX 637 (ALT 250 FOR IP): Performed by: EMERGENCY MEDICINE

## 2024-11-07 PROCEDURE — 99284 EMERGENCY DEPT VISIT MOD MDM: CPT

## 2024-11-07 PROCEDURE — 96372 THER/PROPH/DIAG INJ SC/IM: CPT

## 2024-11-07 PROCEDURE — 80053 COMPREHEN METABOLIC PANEL: CPT

## 2024-11-07 PROCEDURE — 83690 ASSAY OF LIPASE: CPT

## 2024-11-07 PROCEDURE — 71046 X-RAY EXAM CHEST 2 VIEWS: CPT

## 2024-11-07 PROCEDURE — 85025 COMPLETE CBC W/AUTO DIFF WBC: CPT

## 2024-11-07 PROCEDURE — 6360000002 HC RX W HCPCS: Performed by: EMERGENCY MEDICINE

## 2024-11-07 RX ORDER — ONDANSETRON 4 MG/1
4 TABLET, ORALLY DISINTEGRATING ORAL ONCE
Status: COMPLETED | OUTPATIENT
Start: 2024-11-07 | End: 2024-11-07

## 2024-11-07 RX ORDER — KETOROLAC TROMETHAMINE 30 MG/ML
30 INJECTION, SOLUTION INTRAMUSCULAR; INTRAVENOUS ONCE
Status: COMPLETED | OUTPATIENT
Start: 2024-11-07 | End: 2024-11-07

## 2024-11-07 RX ADMIN — ONDANSETRON 4 MG: 4 TABLET, ORALLY DISINTEGRATING ORAL at 07:26

## 2024-11-07 RX ADMIN — KETOROLAC TROMETHAMINE 30 MG: 30 INJECTION, SOLUTION INTRAMUSCULAR at 07:26

## 2024-11-07 ASSESSMENT — PAIN DESCRIPTION - ORIENTATION: ORIENTATION: LOWER

## 2024-11-07 ASSESSMENT — PAIN SCALES - GENERAL
PAINLEVEL_OUTOF10: 10
PAINLEVEL_OUTOF10: 10

## 2024-11-07 ASSESSMENT — PAIN - FUNCTIONAL ASSESSMENT: PAIN_FUNCTIONAL_ASSESSMENT: 0-10

## 2024-11-07 ASSESSMENT — PAIN DESCRIPTION - LOCATION
LOCATION: ABDOMEN;BACK
LOCATION: ABDOMEN

## 2024-11-07 ASSESSMENT — PAIN DESCRIPTION - DESCRIPTORS: DESCRIPTORS: ACHING
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
## 2024-11-07 NOTE — ED PROVIDER NOTES
Encounter   Procedures    XR CHEST (2 VW)    CBC with Auto Differential    Comprehensive Metabolic Panel    Lipase    Urinalysis “IF” dysuria, frequency, or urgency.    POC Pregnancy Urine Qual  “IF” female of childbearing age. (10 - 55 years of age)        Medications given during this emergency department visit:     Medications   ketorolac (TORADOL) injection 30 mg (30 mg IntraMUSCular Given 11/7/24 0726)   ondansetron (ZOFRAN-ODT) disintegrating tablet 4 mg (4 mg Oral Given 11/7/24 0726)       New prescriptions:     New Prescriptions    No medications on file        Past History and Complexity:     Past Medical History:   Diagnosis Date    Alcohol withdrawal syndrome with complication (HCC) 10/11/2017    Anxiety and depression     anxiety, depression    Back pain     problems with L4-5 vertebra    Common bile duct dilatation 6/12/2013    Depression     Gall bladder disease 6/12/2013    Psychiatric disorder     Vaginal candida 11/6/2017        Past Surgical History:   Procedure Laterality Date    CHOLECYSTECTOMY      HAND SURGERY Right 11/21/2022    RIGHT INDEX FINGER DEBRIDEMENT INCISION AND DRAINAGE performed by Osvaldo Juaregui MD at [facility] MAIN OR    HAND SURGERY Right 11/23/2022    Right idnex finger irrigation and debridment performed by Osvaldo Jauregui MD at [facility] MAIN OR    ORTHOPEDIC SURGERY  2007    left foot, tonail excision        Social History     Socioeconomic History    Marital status: Single   Tobacco Use    Smoking status: Former     Types: Cigarettes     Passive exposure: Never    Smokeless tobacco: Never   Vaping Use    Vaping status: Some Days    Substances: Nicotine    Devices: Disposable    Passive vaping exposure: Yes   Substance and Sexual Activity    Alcohol use: Not Currently    Drug use: Not Currently     Types: Prescription    Sexual activity: Yes     Birth control/protection: None   Social History Narrative    ** Merged History Encounter **          Social Determinants of Health

## 2024-11-10 ENCOUNTER — HOSPITAL ENCOUNTER (EMERGENCY)
Age: 44
Discharge: HOME | End: 2024-11-10
Attending: EMERGENCY MEDICINE
Payer: MEDICARE

## 2024-11-10 VITALS
OXYGEN SATURATION: 98 % | WEIGHT: 110 LBS | HEART RATE: 84 BPM | BODY MASS INDEX: 17.27 KG/M2 | HEIGHT: 67 IN | RESPIRATION RATE: 18 BRPM | DIASTOLIC BLOOD PRESSURE: 69 MMHG | TEMPERATURE: 98.4 F | SYSTOLIC BLOOD PRESSURE: 105 MMHG

## 2024-11-10 DIAGNOSIS — G89.29 CHRONIC ABDOMINAL PAIN: Primary | ICD-10-CM

## 2024-11-10 DIAGNOSIS — Z76.5 MALINGERING: ICD-10-CM

## 2024-11-10 DIAGNOSIS — Z76.0 ENCOUNTER FOR MEDICATION REFILL: ICD-10-CM

## 2024-11-10 DIAGNOSIS — G47.00 INSOMNIA, UNSPECIFIED TYPE: ICD-10-CM

## 2024-11-10 DIAGNOSIS — Z91.148 NONCOMPLIANCE WITH MEDICATION REGIMEN: ICD-10-CM

## 2024-11-10 DIAGNOSIS — Z59.00 HOMELESSNESS: ICD-10-CM

## 2024-11-10 DIAGNOSIS — R10.9 CHRONIC ABDOMINAL PAIN: Primary | ICD-10-CM

## 2024-11-10 PROCEDURE — 6370000000 HC RX 637 (ALT 250 FOR IP): Performed by: EMERGENCY MEDICINE

## 2024-11-10 PROCEDURE — 99283 EMERGENCY DEPT VISIT LOW MDM: CPT

## 2024-11-10 RX ORDER — BUSPIRONE HYDROCHLORIDE 10 MG/1
10 TABLET ORAL 2 TIMES DAILY
Qty: 30 TABLET | Refills: 0 | Status: SHIPPED | OUTPATIENT
Start: 2024-11-10

## 2024-11-10 RX ORDER — ONDANSETRON 4 MG/1
4 TABLET, ORALLY DISINTEGRATING ORAL
Status: COMPLETED | OUTPATIENT
Start: 2024-11-10 | End: 2024-11-10

## 2024-11-10 RX ORDER — BENZTROPINE MESYLATE 1 MG/1
1 TABLET ORAL 2 TIMES DAILY
Qty: 60 TABLET | Refills: 0 | Status: SHIPPED | OUTPATIENT
Start: 2024-11-10 | End: 2027-02-24

## 2024-11-10 RX ORDER — OLANZAPINE 5 MG/1
20 TABLET ORAL NIGHTLY
Qty: 30 TABLET | Refills: 0 | Status: SHIPPED | OUTPATIENT
Start: 2024-11-10

## 2024-11-10 RX ORDER — OLANZAPINE 5 MG/1
20 TABLET, ORALLY DISINTEGRATING ORAL
Status: COMPLETED | OUTPATIENT
Start: 2024-11-10 | End: 2024-11-10

## 2024-11-10 RX ADMIN — OLANZAPINE 20 MG: 5 TABLET, ORALLY DISINTEGRATING ORAL at 02:43

## 2024-11-10 RX ADMIN — ONDANSETRON 4 MG: 4 TABLET, ORALLY DISINTEGRATING ORAL at 02:37

## 2024-11-10 SDOH — ECONOMIC STABILITY - HOUSING INSECURITY: HOMELESSNESS UNSPECIFIED: Z59.00

## 2024-11-10 ASSESSMENT — PAIN - FUNCTIONAL ASSESSMENT: PAIN_FUNCTIONAL_ASSESSMENT: 0-10

## 2024-11-10 ASSESSMENT — PAIN SCALES - GENERAL: PAINLEVEL_OUTOF10: 10

## 2024-11-10 ASSESSMENT — PAIN DESCRIPTION - LOCATION: LOCATION: ABDOMEN

## 2024-11-10 NOTE — ED TRIAGE NOTES
Pt reports lower abdominal cramping in abdomen x 2 weeks. Pt having associated back pain and nausea. Pt also reporting insomnia. Pt also asking for refill of routine medication. Zyprexa 20mg, cogentin 10mg, buspar 10mg.

## 2024-11-10 NOTE — ED PROVIDER NOTES
Emergency Department Provider Note       PCP: None, None   Age: 44 y.o.   Sex: female     DISPOSITION Decision To Discharge 11/10/2024 02:36:39 AM    ICD-10-CM    1. Chronic abdominal pain  R10.9     G89.29       2. Insomnia, unspecified type  G47.00       3. Noncompliance with medication regimen  Z91.148       4. Homelessness  Z59.00       5. Encounter for medication refill  Z76.0       6. Malingering  Z76.5           Medical Decision Making     44-year-old homeless  female presents to the emergency department complaining of difficulty with urinating, chronic abdominal pain, feels like she has the flu, concern over HIV exposure 6 months ago, inability to sleep, out of her medications and requesting a refill.  On exam, the patient is awakened from sleep in the chair for exam.  She ambulates without difficulty with no evidence of discomfort.  Initially she says she has difficulty with urinating and then she says she just does not feel like it.  This is the patient's 11th visit in 2 months to the emergency department for similar complaints.  Patient's vital signs are stable, she has a longstanding history of methamphetamine abuse as well as homelessness, and abdominal exam is benign.  She does not appear dehydrated on exam.  She will be treated for her nausea here in the department with Zofran and given her dose of Zyprexa for tonight.  If she gives us a urine, we will check it for infection.  I do not believe further blood testing is necessary at this time, as she has presented with similar complaints multiple times all with the normal CBC and CMP.  We will refill her prescriptions and encouraged her to follow-up with her primary care physician.     1 chronic illness with exacerbation.  Prescription drug management performed.  Chronic medical problems impacting care include homelessness, drug abuse, noncompliance with medication.  I independently ordered and reviewed each unique test.    I reviewed

## 2024-11-10 NOTE — ED NOTES
Pt told triage nurse unable to urinate. Aware urine needed.     Donovan Christopher, RN  11/10/24 6005

## 2024-11-10 NOTE — ED NOTES
Patient mobility status  with no difficulty.     I have reviewed discharge instructions with the patient.  The patient verbalized understanding.    Patient left ED via Discharge Method: ambulatory to Home.    Opportunity for questions and clarification provided.     Patient given 3 scripts.           Donovan Christopher RN  11/10/24 0245

## 2024-12-08 ENCOUNTER — HOSPITAL ENCOUNTER (EMERGENCY)
Age: 44
Discharge: LEFT BEFORE DISCHARGE | End: 2024-12-08
Payer: MEDICARE

## 2024-12-08 VITALS
SYSTOLIC BLOOD PRESSURE: 114 MMHG | BODY MASS INDEX: 15.7 KG/M2 | TEMPERATURE: 97.4 F | HEART RATE: 63 BPM | OXYGEN SATURATION: 98 % | DIASTOLIC BLOOD PRESSURE: 82 MMHG | HEIGHT: 67 IN | RESPIRATION RATE: 19 BRPM | WEIGHT: 100 LBS

## 2024-12-08 DIAGNOSIS — Z59.00 HOMELESS: Primary | ICD-10-CM

## 2024-12-08 PROCEDURE — 99281 EMR DPT VST MAYX REQ PHY/QHP: CPT

## 2024-12-08 PROCEDURE — 99282 EMERGENCY DEPT VISIT SF MDM: CPT

## 2024-12-08 PROCEDURE — 6370000000 HC RX 637 (ALT 250 FOR IP): Performed by: PHYSICIAN ASSISTANT

## 2024-12-08 RX ORDER — OLANZAPINE 5 MG/1
20 TABLET ORAL
Status: DISCONTINUED | OUTPATIENT
Start: 2024-12-08 | End: 2024-12-08 | Stop reason: HOSPADM

## 2024-12-08 SDOH — ECONOMIC STABILITY - HOUSING INSECURITY: HOMELESSNESS UNSPECIFIED: Z59.00

## 2024-12-08 ASSESSMENT — PAIN - FUNCTIONAL ASSESSMENT: PAIN_FUNCTIONAL_ASSESSMENT: 0-10

## 2024-12-08 ASSESSMENT — PAIN SCALES - GENERAL: PAINLEVEL_OUTOF10: 8

## 2024-12-08 NOTE — ED TRIAGE NOTES
Patient arrives to the ER via POV. Patient states she is having an emotion time, abdominal pain with nausea but no vomiting, upset nerves over exposure to HIV, a chemical imbalance that will not allow her to sleep, and it is so cold outside it is affecting her hands. Patient states she is not suicidal or homicidal just overwhelmed. Patient states for the last five years she has been exposed to HIV.

## 2024-12-08 NOTE — ED NOTES
Patient walked to ER 11. Patient refused to enter room stating she wasn't going in there and she \"wasn't doing this.\" Patient ambulated with steady gait to .     Anna Posada RN  12/08/24 0660

## 2024-12-08 NOTE — ED NOTES
This RN was opening medication Zyprexa 20mg for patient infront of patient. Patient states \"nevermind\" and got up from triage, pulled hospital bracelet off, and walked out of ER.     Anna Posada, RN  12/08/24 7318

## 2024-12-08 NOTE — ED PROVIDER NOTES
Eyes:      Extraocular Movements: Extraocular movements intact.   Cardiovascular:      Rate and Rhythm: Normal rate and regular rhythm.      Pulses: Normal pulses.      Heart sounds: Normal heart sounds.   Pulmonary:      Effort: Pulmonary effort is normal.      Breath sounds: Normal breath sounds.   Abdominal:      General: Abdomen is flat.      Palpations: Abdomen is soft.      Tenderness: There is no abdominal tenderness. There is no guarding or rebound.   Musculoskeletal:         General: Normal range of motion.      Cervical back: Normal range of motion and neck supple.   Lymphadenopathy:      Cervical: No cervical adenopathy.   Skin:     General: Skin is warm and dry.      Findings: No rash.   Neurological:      General: No focal deficit present.      Mental Status: She is alert. Mental status is at baseline.   Psychiatric:         Mood and Affect: Mood normal.         Behavior: Behavior normal.         Thought Content: Thought content normal.        Procedures     Procedures    Orders placed during this emergency department visit:   No orders of the defined types were placed in this encounter.       Medications given during this emergency department visit:     Medications   OLANZapine (ZYPREXA) tablet 20 mg (20 mg Oral Not Given 12/8/24 0219)       New prescriptions:     New Prescriptions    No medications on file        Past History and Complexity:     Past Medical History:   Diagnosis Date    Alcohol withdrawal syndrome with complication (HCC) 10/11/2017    Anxiety and depression     anxiety, depression    Back pain     problems with L4-5 vertebra    Common bile duct dilatation 6/12/2013    Depression     Gall bladder disease 6/12/2013    Psychiatric disorder     Vaginal candida 11/6/2017        Past Surgical History:   Procedure Laterality Date    CHOLECYSTECTOMY      HAND SURGERY Right 11/21/2022    RIGHT INDEX FINGER DEBRIDEMENT INCISION AND DRAINAGE performed by Osvaldo Jauregui MD at [facility] OR

## 2025-03-25 ENCOUNTER — HOSPITAL ENCOUNTER (EMERGENCY)
Age: 45
Discharge: HOME | End: 2025-03-25
Payer: MEDICARE

## 2025-03-25 VITALS
BODY MASS INDEX: 15.7 KG/M2 | HEIGHT: 67 IN | SYSTOLIC BLOOD PRESSURE: 120 MMHG | TEMPERATURE: 98.7 F | OXYGEN SATURATION: 100 % | HEART RATE: 81 BPM | RESPIRATION RATE: 17 BRPM | WEIGHT: 100 LBS | DIASTOLIC BLOOD PRESSURE: 101 MMHG

## 2025-03-25 DIAGNOSIS — Z59.00 HOMELESS SINGLE PERSON: Primary | ICD-10-CM

## 2025-03-25 DIAGNOSIS — F22 PARANOID DELUSION (HCC): ICD-10-CM

## 2025-03-25 PROCEDURE — 6370000000 HC RX 637 (ALT 250 FOR IP): Performed by: PHYSICIAN ASSISTANT

## 2025-03-25 PROCEDURE — 99283 EMERGENCY DEPT VISIT LOW MDM: CPT

## 2025-03-25 RX ORDER — OLANZAPINE 5 MG/1
20 TABLET ORAL NIGHTLY
Qty: 120 TABLET | Refills: 3 | Status: SHIPPED | OUTPATIENT
Start: 2025-03-25

## 2025-03-25 RX ORDER — OLANZAPINE 5 MG/1
20 TABLET, ORALLY DISINTEGRATING ORAL
Status: COMPLETED | OUTPATIENT
Start: 2025-03-25 | End: 2025-03-25

## 2025-03-25 RX ADMIN — OLANZAPINE 20 MG: 5 TABLET, ORALLY DISINTEGRATING ORAL at 03:11

## 2025-03-25 SDOH — ECONOMIC STABILITY - HOUSING INSECURITY: HOMELESSNESS UNSPECIFIED: Z59.00

## 2025-03-25 ASSESSMENT — PAIN DESCRIPTION - LOCATION: LOCATION: GENERALIZED

## 2025-03-25 ASSESSMENT — PAIN SCALES - GENERAL: PAINLEVEL_OUTOF10: 10

## 2025-03-25 ASSESSMENT — PAIN - FUNCTIONAL ASSESSMENT: PAIN_FUNCTIONAL_ASSESSMENT: 0-10
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
## 2025-03-25 NOTE — ED PROVIDER NOTES
Emergency Department Provider Note       PCP: None, None   Age: 45 y.o.   Sex: female     DISPOSITION Decision To Discharge 03/25/2025 02:53:38 AM   DISPOSITION CONDITION Stable            ICD-10-CM    1. Homeless single person  Z59.00       2. Paranoid delusion (HCC)  F22           Medical Decision Making     Will give dose of Zyprexa here.  Will discharge home with refill per patient's request.     1 acute illness with systemic symptoms.  Over the counter drug management performed.  Patient was discharged risks and benefits of hospitalization were considered.  Shared medical decision making was utilized in creating the patients health plan today.  I independently ordered and reviewed each unique test.    History     45-year-old female with history of psychosis, requesting refill of her Zyprexa.  History of paranoid delusions.  She is also homeless and seeking shelter.  No other medical complaints.    The history is provided by the patient. No  was used.     Physical Exam     Vitals signs and nursing note reviewed:  Vitals:    03/25/25 0228   BP: (!) 120/101   Pulse: 81   Resp: 17   Temp: 98.7 °F (37.1 °C)   TempSrc: Oral   SpO2: 100%   Weight: 45.4 kg (100 lb)   Height: 1.702 m (5' 7\")      Physical Exam  Vitals and nursing note reviewed.   Constitutional:       General: She is not in acute distress.     Appearance: Normal appearance. She is not toxic-appearing.   HENT:      Head: Normocephalic and atraumatic.   Cardiovascular:      Rate and Rhythm: Normal rate.   Pulmonary:      Effort: Pulmonary effort is normal. No respiratory distress.   Musculoskeletal:         General: Normal range of motion.   Neurological:      General: No focal deficit present.      Mental Status: She is alert and oriented to person, place, and time.   Psychiatric:         Speech: Speech is rapid and pressured.         Behavior: Behavior normal.         Thought Content: Thought content is paranoid.        Procedures

## 2025-04-22 NOTE — PROGRESS NOTES
Gretaraquel ScottCasper is a 21 y.o. female    Patient reports irregular bleeding and painful menses .  Last menses was on :  Sexually active :  Birth control :   Patient reports nausea    Gave 4 mg zofran iv

## 2025-05-23 ENCOUNTER — APPOINTMENT (OUTPATIENT)
Dept: GENERAL RADIOLOGY | Age: 45
End: 2025-05-23
Payer: MEDICARE

## 2025-05-23 ENCOUNTER — HOSPITAL ENCOUNTER (EMERGENCY)
Age: 45
Discharge: HOME | End: 2025-05-23
Payer: MEDICARE

## 2025-05-23 VITALS
OXYGEN SATURATION: 100 % | TEMPERATURE: 97.5 F | RESPIRATION RATE: 16 BRPM | HEART RATE: 75 BPM | WEIGHT: 106 LBS | DIASTOLIC BLOOD PRESSURE: 83 MMHG | SYSTOLIC BLOOD PRESSURE: 116 MMHG | HEIGHT: 67 IN | BODY MASS INDEX: 16.64 KG/M2

## 2025-05-23 DIAGNOSIS — F22 PARANOIA (HCC): ICD-10-CM

## 2025-05-23 DIAGNOSIS — M54.12 CERVICAL RADICULOPATHY: Primary | ICD-10-CM

## 2025-05-23 PROCEDURE — 96372 THER/PROPH/DIAG INJ SC/IM: CPT

## 2025-05-23 PROCEDURE — 72040 X-RAY EXAM NECK SPINE 2-3 VW: CPT

## 2025-05-23 PROCEDURE — 6370000000 HC RX 637 (ALT 250 FOR IP): Performed by: PHYSICIAN ASSISTANT

## 2025-05-23 PROCEDURE — 99283 EMERGENCY DEPT VISIT LOW MDM: CPT

## 2025-05-23 PROCEDURE — 6360000002 HC RX W HCPCS: Performed by: PHYSICIAN ASSISTANT

## 2025-05-23 RX ORDER — CYCLOBENZAPRINE HCL 10 MG
10 TABLET ORAL
Status: DISCONTINUED | OUTPATIENT
Start: 2025-05-23 | End: 2025-05-23

## 2025-05-23 RX ORDER — OLANZAPINE 5 MG/1
20 TABLET, FILM COATED ORAL NIGHTLY
Status: DISCONTINUED | OUTPATIENT
Start: 2025-05-23 | End: 2025-05-24 | Stop reason: HOSPADM

## 2025-05-23 RX ORDER — CYCLOBENZAPRINE HCL 10 MG
5 TABLET ORAL
Status: COMPLETED | OUTPATIENT
Start: 2025-05-23 | End: 2025-05-23

## 2025-05-23 RX ORDER — CYCLOBENZAPRINE HCL 5 MG
5 TABLET ORAL 3 TIMES DAILY PRN
Qty: 30 TABLET | Refills: 0 | Status: SHIPPED | OUTPATIENT
Start: 2025-05-23 | End: 2025-06-02

## 2025-05-23 RX ORDER — KETOROLAC TROMETHAMINE 30 MG/ML
30 INJECTION, SOLUTION INTRAMUSCULAR; INTRAVENOUS ONCE
Status: COMPLETED | OUTPATIENT
Start: 2025-05-23 | End: 2025-05-23

## 2025-05-23 RX ADMIN — CYCLOBENZAPRINE 5 MG: 10 TABLET, FILM COATED ORAL at 23:41

## 2025-05-23 RX ADMIN — KETOROLAC TROMETHAMINE 30 MG: 30 INJECTION, SOLUTION INTRAMUSCULAR at 23:31

## 2025-05-23 RX ADMIN — OLANZAPINE 20 MG: 5 TABLET, FILM COATED ORAL at 23:41

## 2025-05-23 ASSESSMENT — PAIN SCALES - GENERAL
PAINLEVEL_OUTOF10: 10
PAINLEVEL_OUTOF10: 10

## 2025-05-23 ASSESSMENT — ENCOUNTER SYMPTOMS
RESPIRATORY NEGATIVE: 1
GASTROINTESTINAL NEGATIVE: 1

## 2025-05-23 ASSESSMENT — PAIN DESCRIPTION - DESCRIPTORS
DESCRIPTORS: ACHING
DESCRIPTORS: DISCOMFORT

## 2025-05-23 ASSESSMENT — PAIN DESCRIPTION - LOCATION
LOCATION: ABDOMEN
LOCATION: ARM

## 2025-05-23 ASSESSMENT — PAIN DESCRIPTION - ORIENTATION
ORIENTATION: LEFT
ORIENTATION: LEFT

## 2025-05-23 ASSESSMENT — PAIN - FUNCTIONAL ASSESSMENT: PAIN_FUNCTIONAL_ASSESSMENT: 0-10
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
## 2025-07-19 ENCOUNTER — HOSPITAL ENCOUNTER (EMERGENCY)
Age: 45
Discharge: LEFT BEFORE DISCHARGE | End: 2025-07-19
Attending: EMERGENCY MEDICINE

## 2025-07-19 ENCOUNTER — HOSPITAL ENCOUNTER (EMERGENCY)
Age: 45
Discharge: LEFT BEFORE DISCHARGE | End: 2025-07-19

## 2025-07-19 VITALS
HEIGHT: 67 IN | DIASTOLIC BLOOD PRESSURE: 84 MMHG | TEMPERATURE: 98.1 F | OXYGEN SATURATION: 98 % | BODY MASS INDEX: 17.27 KG/M2 | RESPIRATION RATE: 16 BRPM | SYSTOLIC BLOOD PRESSURE: 113 MMHG | HEART RATE: 88 BPM | WEIGHT: 110 LBS

## 2025-07-19 VITALS
OXYGEN SATURATION: 98 % | RESPIRATION RATE: 16 BRPM | SYSTOLIC BLOOD PRESSURE: 97 MMHG | HEART RATE: 77 BPM | TEMPERATURE: 98.1 F | DIASTOLIC BLOOD PRESSURE: 68 MMHG

## 2025-07-19 PROCEDURE — 4500000002 HC ER NO CHARGE

## 2025-07-19 ASSESSMENT — PAIN DESCRIPTION - LOCATION: LOCATION: BACK;MOUTH

## 2025-07-19 ASSESSMENT — PAIN SCALES - GENERAL: PAINLEVEL_OUTOF10: 10

## 2025-07-19 ASSESSMENT — PAIN DESCRIPTION - FREQUENCY: FREQUENCY: CONTINUOUS

## 2025-07-19 ASSESSMENT — PAIN - FUNCTIONAL ASSESSMENT: PAIN_FUNCTIONAL_ASSESSMENT: 0-10
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
## 2025-07-19 NOTE — ED NOTES
Patient calmer today. PO pain medications, IV abx. Patient calls for assistance.   Pt informed that she could stay for medical evaluation by provider is she needed medical attention. Pt refusing to stay and wanting to leave. She exited room with security. Pt provided with clothing prior to her leaving.      Amanda Biggs, RN  07/19/25 4705

## 2025-07-19 NOTE — ED TRIAGE NOTES
Pt states she is having back pain and that she has blisters on her lips that are painful    Pt states she has HIV and is dying     Pt would like an xray of her back

## 2025-07-23 NOTE — ED PROVIDER NOTES
Patient was not seen by this provider.  She was given paper scrubs and insisted on leaving and I was only made aware that she had left after she had left the facility please see accompanying nursing notes     Jasmeet Aviles MD  07/22/25 9274

## 2025-08-12 ENCOUNTER — HOSPITAL ENCOUNTER (EMERGENCY)
Age: 45
Discharge: HOME | End: 2025-08-12
Attending: EMERGENCY MEDICINE
Payer: MEDICARE

## 2025-08-12 VITALS
BODY MASS INDEX: 17.23 KG/M2 | RESPIRATION RATE: 15 BRPM | HEIGHT: 67 IN | SYSTOLIC BLOOD PRESSURE: 109 MMHG | OXYGEN SATURATION: 100 % | HEART RATE: 91 BPM | TEMPERATURE: 98.1 F | DIASTOLIC BLOOD PRESSURE: 83 MMHG

## 2025-08-12 DIAGNOSIS — F14.10 COCAINE ABUSE (HCC): Primary | ICD-10-CM

## 2025-08-12 PROCEDURE — 99282 EMERGENCY DEPT VISIT SF MDM: CPT

## 2025-08-12 ASSESSMENT — PAIN - FUNCTIONAL ASSESSMENT: PAIN_FUNCTIONAL_ASSESSMENT: 0-10

## 2025-08-12 ASSESSMENT — PAIN SCALES - GENERAL: PAINLEVEL_OUTOF10: 0

## (undated) DEVICE — UPPER EXTREMITY: Brand: MEDLINE INDUSTRIES, INC.

## (undated) DEVICE — SOLUTION IRRIG 3000ML 0.9% SOD CHL USP UROMATIC PLAS CONT

## (undated) DEVICE — GLOVE SURG SZ 7 L12IN FNGR THK79MIL GRN LTX FREE

## (undated) DEVICE — HANDPIECE SET WITH COAXIAL HIGH FLOW TIP AND SUCTION TUBE: Brand: INTERPULSE

## (undated) DEVICE — SPLINT THMB W3XL12IN FBRGLS PD PRECUT LTWT DURABLE FAST SET

## (undated) DEVICE — SYRINGE MED 50ML LUERLOCK TIP

## (undated) DEVICE — PREMIUM WET SKIN PREP TRAY: Brand: MEDLINE INDUSTRIES, INC.

## (undated) DEVICE — GLOVE SURG SZ 8 L12IN FNGR THK79MIL GRN LTX FREE

## (undated) DEVICE — 7 DAY SILVER-COATED ANTIMICROBIAL BARRIER DRESSING: Brand: ACTICOAT 7  4" X 5"

## (undated) DEVICE — BANDAGE,GAUZE,BULKEE II,4.5"X4.1YD,STRL: Brand: MEDLINE

## (undated) DEVICE — ADAPTER MON OD15X22MM ID15MM STD LUER FIT W/ LIFESAVER

## (undated) DEVICE — DRESSING,GAUZE,XEROFORM,CURAD,5"X9",ST: Brand: CURAD